# Patient Record
Sex: FEMALE | Race: WHITE | Employment: UNEMPLOYED | URBAN - METROPOLITAN AREA
[De-identification: names, ages, dates, MRNs, and addresses within clinical notes are randomized per-mention and may not be internally consistent; named-entity substitution may affect disease eponyms.]

---

## 2017-10-16 PROBLEM — G56.03 CARPAL TUNNEL SYNDROME ON BOTH SIDES: Status: ACTIVE | Noted: 2017-10-16

## 2017-10-16 PROBLEM — G43.009 MIGRAINE WITHOUT AURA AND WITHOUT STATUS MIGRAINOSUS, NOT INTRACTABLE: Status: ACTIVE | Noted: 2017-10-16

## 2017-10-16 PROBLEM — K21.9 GERD (GASTROESOPHAGEAL REFLUX DISEASE): Status: ACTIVE | Noted: 2017-06-14

## 2017-10-16 PROBLEM — I25.10 ATHEROSCLEROSIS OF CORONARY ARTERY: Status: ACTIVE | Noted: 2017-10-16

## 2017-10-16 PROBLEM — R06.02 SOB (SHORTNESS OF BREATH): Status: ACTIVE | Noted: 2017-10-16

## 2017-10-16 PROBLEM — R53.82 CHRONIC FATIGUE: Status: ACTIVE | Noted: 2017-10-16

## 2017-10-16 PROBLEM — R13.19 ESOPHAGEAL DYSPHAGIA: Status: ACTIVE | Noted: 2017-06-14

## 2017-10-16 PROBLEM — K59.01 SLOW TRANSIT CONSTIPATION: Status: ACTIVE | Noted: 2017-06-14

## 2017-10-19 ENCOUNTER — HOSPITAL ENCOUNTER (OUTPATIENT)
Dept: MAMMOGRAPHY | Age: 52
Discharge: HOME | End: 2017-10-19
Attending: NURSE PRACTITIONER

## 2017-10-19 DIAGNOSIS — Z12.31 SCREENING MAMMOGRAM, ENCOUNTER FOR: ICD-10-CM

## 2017-10-20 ENCOUNTER — HOSPITAL ENCOUNTER (OUTPATIENT)
Dept: MAMMOGRAPHY | Age: 52
Discharge: HOME | End: 2017-10-20
Attending: NURSE PRACTITIONER
Payer: COMMERCIAL

## 2017-10-20 ENCOUNTER — HOSPITAL ENCOUNTER (OUTPATIENT)
Dept: GENERAL RADIOLOGY | Age: 52
End: 2017-10-20
Payer: COMMERCIAL

## 2017-10-20 PROCEDURE — 77067 SCR MAMMO BI INCL CAD: CPT

## 2019-05-09 PROBLEM — R06.02 SOB (SHORTNESS OF BREATH): Status: RESOLVED | Noted: 2017-10-16 | Resolved: 2019-05-09

## 2019-06-17 PROBLEM — R79.89 ELEVATED TSH: Status: ACTIVE | Noted: 2019-06-17

## 2019-06-17 PROBLEM — F33.0 DEPRESSION, MAJOR, RECURRENT, MILD (HCC): Status: ACTIVE | Noted: 2019-06-17

## 2019-06-17 PROBLEM — F32.A MILD DEPRESSION: Status: ACTIVE | Noted: 2019-06-17

## 2019-06-17 PROBLEM — D22.9 MULTIPLE NEVI: Status: ACTIVE | Noted: 2019-06-17

## 2020-01-23 PROBLEM — E11.40 TYPE 2 DIABETES MELLITUS WITH DIABETIC NEUROPATHY (HCC): Status: ACTIVE | Noted: 2020-01-23

## 2020-01-23 PROBLEM — F32.A MILD DEPRESSION: Status: RESOLVED | Noted: 2019-06-17 | Resolved: 2020-01-23

## 2020-02-07 ENCOUNTER — HOSPITAL ENCOUNTER (OUTPATIENT)
Dept: CT IMAGING | Age: 55
Discharge: HOME | End: 2020-02-07
Payer: COMMERCIAL

## 2020-02-07 DIAGNOSIS — R51.9 WORSENING HEADACHES: ICD-10-CM

## 2020-02-07 PROCEDURE — 70450 CT HEAD/BRAIN W/O DYE: CPT

## 2020-02-10 NOTE — PROGRESS NOTES
Pt was made aware of her CT head being normal and she said she saw a neurologist many yrs ago for dx spinal meningitis (apporx 15 yrs ago)

## 2020-02-22 ENCOUNTER — APPOINTMENT (OUTPATIENT)
Dept: CT IMAGING | Age: 55
End: 2020-02-22
Attending: STUDENT IN AN ORGANIZED HEALTH CARE EDUCATION/TRAINING PROGRAM
Payer: COMMERCIAL

## 2020-02-22 ENCOUNTER — HOSPITAL ENCOUNTER (OUTPATIENT)
Age: 55
Setting detail: OBSERVATION
Discharge: HOME | End: 2020-02-23
Attending: STUDENT IN AN ORGANIZED HEALTH CARE EDUCATION/TRAINING PROGRAM | Admitting: SURGERY
Payer: COMMERCIAL

## 2020-02-22 DIAGNOSIS — K37 APPENDICITIS, UNSPECIFIED APPENDICITIS TYPE: Primary | ICD-10-CM

## 2020-02-22 PROBLEM — K35.80 ACUTE APPENDICITIS: Status: ACTIVE | Noted: 2020-02-22

## 2020-02-22 LAB
ALBUMIN SERPL-MCNC: 3.7 G/DL (ref 3.5–5)
ALBUMIN/GLOB SERPL: 0.9 {RATIO} (ref 1.2–3.5)
ALP SERPL-CCNC: 143 U/L (ref 50–136)
ALT SERPL-CCNC: 51 U/L (ref 12–65)
ANION GAP SERPL CALC-SCNC: 7 MMOL/L (ref 7–16)
AST SERPL-CCNC: 21 U/L (ref 15–37)
BASOPHILS # BLD: 0.1 K/UL (ref 0–0.2)
BASOPHILS NFR BLD: 1 % (ref 0–2)
BILIRUB SERPL-MCNC: 0.6 MG/DL (ref 0.2–1.1)
BUN SERPL-MCNC: 12 MG/DL (ref 6–23)
CALCIUM SERPL-MCNC: 9.8 MG/DL (ref 8.3–10.4)
CHLORIDE SERPL-SCNC: 104 MMOL/L (ref 98–107)
CO2 SERPL-SCNC: 28 MMOL/L (ref 21–32)
CREAT SERPL-MCNC: 1.05 MG/DL (ref 0.6–1)
DIFFERENTIAL METHOD BLD: NORMAL
EOSINOPHIL # BLD: 0.1 K/UL (ref 0–0.8)
EOSINOPHIL NFR BLD: 1 % (ref 0.5–7.8)
ERYTHROCYTE [DISTWIDTH] IN BLOOD BY AUTOMATED COUNT: 13.1 % (ref 11.9–14.6)
GLOBULIN SER CALC-MCNC: 4 G/DL (ref 2.3–3.5)
GLUCOSE SERPL-MCNC: 157 MG/DL (ref 65–100)
HCT VFR BLD AUTO: 43 % (ref 35.8–46.3)
HGB BLD-MCNC: 14.6 G/DL (ref 11.7–15.4)
IMM GRANULOCYTES # BLD AUTO: 0.1 K/UL (ref 0–0.5)
IMM GRANULOCYTES NFR BLD AUTO: 1 % (ref 0–5)
LIPASE SERPL-CCNC: 89 U/L (ref 73–393)
LYMPHOCYTES # BLD: 3 K/UL (ref 0.5–4.6)
LYMPHOCYTES NFR BLD: 28 % (ref 13–44)
MCH RBC QN AUTO: 29.7 PG (ref 26.1–32.9)
MCHC RBC AUTO-ENTMCNC: 34 G/DL (ref 31.4–35)
MCV RBC AUTO: 87.6 FL (ref 79.6–97.8)
MONOCYTES # BLD: 0.7 K/UL (ref 0.1–1.3)
MONOCYTES NFR BLD: 7 % (ref 4–12)
NEUTS SEG # BLD: 6.8 K/UL (ref 1.7–8.2)
NEUTS SEG NFR BLD: 63 % (ref 43–78)
NRBC # BLD: 0 K/UL (ref 0–0.2)
PLATELET # BLD AUTO: 384 K/UL (ref 150–450)
PMV BLD AUTO: 9.9 FL (ref 9.4–12.3)
POTASSIUM SERPL-SCNC: 3.8 MMOL/L (ref 3.5–5.1)
PROT SERPL-MCNC: 7.7 G/DL (ref 6.3–8.2)
RBC # BLD AUTO: 4.91 M/UL (ref 4.05–5.2)
SODIUM SERPL-SCNC: 139 MMOL/L (ref 136–145)
WBC # BLD AUTO: 10.8 K/UL (ref 4.3–11.1)

## 2020-02-22 PROCEDURE — 65270000029 HC RM PRIVATE

## 2020-02-22 PROCEDURE — 77030040361 HC SLV COMPR DVT MDII -B

## 2020-02-22 PROCEDURE — 96376 TX/PRO/DX INJ SAME DRUG ADON: CPT

## 2020-02-22 PROCEDURE — 96374 THER/PROPH/DIAG INJ IV PUSH: CPT

## 2020-02-22 PROCEDURE — 74177 CT ABD & PELVIS W/CONTRAST: CPT

## 2020-02-22 PROCEDURE — 80053 COMPREHEN METABOLIC PANEL: CPT

## 2020-02-22 PROCEDURE — 74011250636 HC RX REV CODE- 250/636: Performed by: STUDENT IN AN ORGANIZED HEALTH CARE EDUCATION/TRAINING PROGRAM

## 2020-02-22 PROCEDURE — 99218 HC RM OBSERVATION: CPT

## 2020-02-22 PROCEDURE — 85025 COMPLETE CBC W/AUTO DIFF WBC: CPT

## 2020-02-22 PROCEDURE — 83690 ASSAY OF LIPASE: CPT

## 2020-02-22 PROCEDURE — 74011636320 HC RX REV CODE- 636/320: Performed by: STUDENT IN AN ORGANIZED HEALTH CARE EDUCATION/TRAINING PROGRAM

## 2020-02-22 PROCEDURE — 77030027138 HC INCENT SPIROMETER -A

## 2020-02-22 PROCEDURE — 74011000258 HC RX REV CODE- 258: Performed by: STUDENT IN AN ORGANIZED HEALTH CARE EDUCATION/TRAINING PROGRAM

## 2020-02-22 PROCEDURE — 96375 TX/PRO/DX INJ NEW DRUG ADDON: CPT

## 2020-02-22 PROCEDURE — 99284 EMERGENCY DEPT VISIT MOD MDM: CPT

## 2020-02-22 RX ORDER — MORPHINE SULFATE 2 MG/ML
2 INJECTION, SOLUTION INTRAMUSCULAR; INTRAVENOUS
Status: DISCONTINUED | OUTPATIENT
Start: 2020-02-22 | End: 2020-02-23 | Stop reason: HOSPADM

## 2020-02-22 RX ORDER — ONDANSETRON 2 MG/ML
4 INJECTION INTRAMUSCULAR; INTRAVENOUS
Status: DISCONTINUED | OUTPATIENT
Start: 2020-02-22 | End: 2020-02-23 | Stop reason: HOSPADM

## 2020-02-22 RX ORDER — DEXTROSE, SODIUM CHLORIDE, AND POTASSIUM CHLORIDE 5; .45; .15 G/100ML; G/100ML; G/100ML
75 INJECTION INTRAVENOUS CONTINUOUS
Status: DISCONTINUED | OUTPATIENT
Start: 2020-02-22 | End: 2020-02-23 | Stop reason: HOSPADM

## 2020-02-22 RX ORDER — DIPHENHYDRAMINE HYDROCHLORIDE 50 MG/ML
12.5 INJECTION, SOLUTION INTRAMUSCULAR; INTRAVENOUS
Status: DISCONTINUED | OUTPATIENT
Start: 2020-02-22 | End: 2020-02-23 | Stop reason: HOSPADM

## 2020-02-22 RX ORDER — CEFOXITIN 2 G/1
2 INJECTION, POWDER, FOR SOLUTION INTRAVENOUS ONCE
Status: DISCONTINUED | OUTPATIENT
Start: 2020-02-22 | End: 2020-02-22 | Stop reason: SDUPTHER

## 2020-02-22 RX ORDER — NALOXONE HYDROCHLORIDE 0.4 MG/ML
0.4 INJECTION, SOLUTION INTRAMUSCULAR; INTRAVENOUS; SUBCUTANEOUS AS NEEDED
Status: DISCONTINUED | OUTPATIENT
Start: 2020-02-22 | End: 2020-02-23 | Stop reason: HOSPADM

## 2020-02-22 RX ORDER — SODIUM CHLORIDE 0.9 % (FLUSH) 0.9 %
10 SYRINGE (ML) INJECTION
Status: COMPLETED | OUTPATIENT
Start: 2020-02-22 | End: 2020-02-22

## 2020-02-22 RX ORDER — MORPHINE SULFATE 4 MG/ML
4 INJECTION INTRAVENOUS
Status: DISCONTINUED | OUTPATIENT
Start: 2020-02-22 | End: 2020-02-22

## 2020-02-22 RX ORDER — HYDROMORPHONE HYDROCHLORIDE 1 MG/ML
1 INJECTION, SOLUTION INTRAMUSCULAR; INTRAVENOUS; SUBCUTANEOUS
Status: COMPLETED | OUTPATIENT
Start: 2020-02-22 | End: 2020-02-22

## 2020-02-22 RX ORDER — ACETAMINOPHEN 325 MG/1
650 TABLET ORAL
Status: DISCONTINUED | OUTPATIENT
Start: 2020-02-22 | End: 2020-02-23 | Stop reason: HOSPADM

## 2020-02-22 RX ORDER — SODIUM CHLORIDE 0.9 % (FLUSH) 0.9 %
5-40 SYRINGE (ML) INJECTION AS NEEDED
Status: DISCONTINUED | OUTPATIENT
Start: 2020-02-22 | End: 2020-02-23 | Stop reason: HOSPADM

## 2020-02-22 RX ORDER — SODIUM CHLORIDE 0.9 % (FLUSH) 0.9 %
5-40 SYRINGE (ML) INJECTION EVERY 8 HOURS
Status: DISCONTINUED | OUTPATIENT
Start: 2020-02-22 | End: 2020-02-23 | Stop reason: HOSPADM

## 2020-02-22 RX ORDER — OXYCODONE AND ACETAMINOPHEN 5; 325 MG/1; MG/1
1 TABLET ORAL
Status: DISCONTINUED | OUTPATIENT
Start: 2020-02-22 | End: 2020-02-23 | Stop reason: HOSPADM

## 2020-02-22 RX ORDER — HYDROMORPHONE HYDROCHLORIDE 1 MG/ML
1 INJECTION, SOLUTION INTRAMUSCULAR; INTRAVENOUS; SUBCUTANEOUS ONCE
Status: COMPLETED | OUTPATIENT
Start: 2020-02-22 | End: 2020-02-22

## 2020-02-22 RX ORDER — ONDANSETRON 2 MG/ML
4 INJECTION INTRAMUSCULAR; INTRAVENOUS
Status: COMPLETED | OUTPATIENT
Start: 2020-02-22 | End: 2020-02-22

## 2020-02-22 RX ADMIN — ONDANSETRON 4 MG: 2 INJECTION INTRAMUSCULAR; INTRAVENOUS at 21:54

## 2020-02-22 RX ADMIN — HYDROMORPHONE HYDROCHLORIDE 1 MG: 1 INJECTION, SOLUTION INTRAMUSCULAR; INTRAVENOUS; SUBCUTANEOUS at 21:54

## 2020-02-22 RX ADMIN — IOPAMIDOL 100 ML: 755 INJECTION, SOLUTION INTRAVENOUS at 22:21

## 2020-02-22 RX ADMIN — SODIUM CHLORIDE 1000 ML: 900 INJECTION, SOLUTION INTRAVENOUS at 21:54

## 2020-02-22 RX ADMIN — SODIUM CHLORIDE 100 ML: 900 INJECTION, SOLUTION INTRAVENOUS at 22:21

## 2020-02-22 RX ADMIN — Medication 10 ML: at 22:21

## 2020-02-22 RX ADMIN — HYDROMORPHONE HYDROCHLORIDE 1 MG: 1 INJECTION, SOLUTION INTRAMUSCULAR; INTRAVENOUS; SUBCUTANEOUS at 22:47

## 2020-02-23 ENCOUNTER — ANESTHESIA EVENT (OUTPATIENT)
Dept: SURGERY | Age: 55
End: 2020-02-23
Payer: COMMERCIAL

## 2020-02-23 ENCOUNTER — ANESTHESIA (OUTPATIENT)
Dept: SURGERY | Age: 55
End: 2020-02-23
Payer: COMMERCIAL

## 2020-02-23 VITALS
HEIGHT: 65 IN | TEMPERATURE: 98.1 F | DIASTOLIC BLOOD PRESSURE: 86 MMHG | HEART RATE: 89 BPM | OXYGEN SATURATION: 94 % | RESPIRATION RATE: 15 BRPM | WEIGHT: 293 LBS | SYSTOLIC BLOOD PRESSURE: 130 MMHG | BODY MASS INDEX: 48.82 KG/M2

## 2020-02-23 LAB — GLUCOSE BLD STRIP.AUTO-MCNC: 189 MG/DL (ref 65–100)

## 2020-02-23 PROCEDURE — 77030008756 HC TU IRR SUC STRY -B: Performed by: SURGERY

## 2020-02-23 PROCEDURE — 76210000016 HC OR PH I REC 1 TO 1.5 HR: Performed by: SURGERY

## 2020-02-23 PROCEDURE — 74011250636 HC RX REV CODE- 250/636: Performed by: SURGERY

## 2020-02-23 PROCEDURE — 77030039425 HC BLD LARYNG TRULITE DISP TELE -A: Performed by: ANESTHESIOLOGY

## 2020-02-23 PROCEDURE — 74011000250 HC RX REV CODE- 250: Performed by: REGISTERED NURSE

## 2020-02-23 PROCEDURE — 77030040922 HC BLNKT HYPOTHRM STRY -A: Performed by: ANESTHESIOLOGY

## 2020-02-23 PROCEDURE — 74011250637 HC RX REV CODE- 250/637: Performed by: ANESTHESIOLOGY

## 2020-02-23 PROCEDURE — 99218 HC RM OBSERVATION: CPT

## 2020-02-23 PROCEDURE — 74011250637 HC RX REV CODE- 250/637: Performed by: SURGERY

## 2020-02-23 PROCEDURE — 76010000161 HC OR TIME 1 TO 1.5 HR INTENSV-TIER 1: Performed by: SURGERY

## 2020-02-23 PROCEDURE — 77030009967 HC RELD STPLR ENDOSC J&J -C: Performed by: SURGERY

## 2020-02-23 PROCEDURE — 77030031139 HC SUT VCRL2 J&J -A: Performed by: SURGERY

## 2020-02-23 PROCEDURE — 76060000033 HC ANESTHESIA 1 TO 1.5 HR: Performed by: SURGERY

## 2020-02-23 PROCEDURE — 77030011810 HC STPLR ENDOSC J&J -G: Performed by: SURGERY

## 2020-02-23 PROCEDURE — 77030037088 HC TUBE ENDOTRACH ORAL NSL COVD-A: Performed by: ANESTHESIOLOGY

## 2020-02-23 PROCEDURE — 77030016151 HC PROTCTR LNS DFOG COVD -B: Performed by: SURGERY

## 2020-02-23 PROCEDURE — 77030034849: Performed by: SURGERY

## 2020-02-23 PROCEDURE — 74011250636 HC RX REV CODE- 250/636: Performed by: REGISTERED NURSE

## 2020-02-23 PROCEDURE — 74011250636 HC RX REV CODE- 250/636: Performed by: ANESTHESIOLOGY

## 2020-02-23 PROCEDURE — 82962 GLUCOSE BLOOD TEST: CPT

## 2020-02-23 PROCEDURE — 88304 TISSUE EXAM BY PATHOLOGIST: CPT

## 2020-02-23 PROCEDURE — 77030034154 HC SHR COAG HARM ACE J&J -F: Performed by: SURGERY

## 2020-02-23 PROCEDURE — 77030037892: Performed by: SURGERY

## 2020-02-23 PROCEDURE — 44970 LAPAROSCOPY APPENDECTOMY: CPT | Performed by: SURGERY

## 2020-02-23 PROCEDURE — 77030019908 HC STETH ESOPH SIMS -A: Performed by: ANESTHESIOLOGY

## 2020-02-23 PROCEDURE — 77030040361 HC SLV COMPR DVT MDII -B: Performed by: SURGERY

## 2020-02-23 PROCEDURE — 74011000250 HC RX REV CODE- 250: Performed by: SURGERY

## 2020-02-23 PROCEDURE — 77030018836 HC SOL IRR NACL ICUM -A: Performed by: SURGERY

## 2020-02-23 PROCEDURE — 0DTJ4ZZ RESECTION OF APPENDIX, PERCUTANEOUS ENDOSCOPIC APPROACH: ICD-10-PCS | Performed by: SURGERY

## 2020-02-23 PROCEDURE — 99223 1ST HOSP IP/OBS HIGH 75: CPT | Performed by: SURGERY

## 2020-02-23 PROCEDURE — 74011000258 HC RX REV CODE- 258: Performed by: SURGERY

## 2020-02-23 PROCEDURE — 77030008522 HC TBNG INSUF LAPRO STRY -B: Performed by: SURGERY

## 2020-02-23 RX ORDER — OXYCODONE HYDROCHLORIDE 5 MG/1
10 TABLET ORAL
Status: DISCONTINUED | OUTPATIENT
Start: 2020-02-23 | End: 2020-02-23 | Stop reason: HOSPADM

## 2020-02-23 RX ORDER — LIDOCAINE HYDROCHLORIDE 10 MG/ML
0.1 INJECTION INFILTRATION; PERINEURAL AS NEEDED
Status: DISCONTINUED | OUTPATIENT
Start: 2020-02-23 | End: 2020-02-23 | Stop reason: HOSPADM

## 2020-02-23 RX ORDER — FAMOTIDINE 20 MG/1
20 TABLET, FILM COATED ORAL ONCE
Status: COMPLETED | OUTPATIENT
Start: 2020-02-23 | End: 2020-02-23

## 2020-02-23 RX ORDER — FENTANYL CITRATE 50 UG/ML
INJECTION, SOLUTION INTRAMUSCULAR; INTRAVENOUS AS NEEDED
Status: DISCONTINUED | OUTPATIENT
Start: 2020-02-23 | End: 2020-02-23 | Stop reason: HOSPADM

## 2020-02-23 RX ORDER — SUCCINYLCHOLINE CHLORIDE 20 MG/ML
INJECTION INTRAMUSCULAR; INTRAVENOUS AS NEEDED
Status: DISCONTINUED | OUTPATIENT
Start: 2020-02-23 | End: 2020-02-23 | Stop reason: HOSPADM

## 2020-02-23 RX ORDER — CYCLOSPORINE 0.5 MG/ML
1 EMULSION OPHTHALMIC EVERY 12 HOURS
COMMUNITY

## 2020-02-23 RX ORDER — HYDROMORPHONE HYDROCHLORIDE 2 MG/ML
0.5 INJECTION, SOLUTION INTRAMUSCULAR; INTRAVENOUS; SUBCUTANEOUS
Status: DISCONTINUED | OUTPATIENT
Start: 2020-02-23 | End: 2020-02-23 | Stop reason: HOSPADM

## 2020-02-23 RX ORDER — ONDANSETRON 2 MG/ML
INJECTION INTRAMUSCULAR; INTRAVENOUS AS NEEDED
Status: DISCONTINUED | OUTPATIENT
Start: 2020-02-23 | End: 2020-02-23 | Stop reason: HOSPADM

## 2020-02-23 RX ORDER — FENTANYL CITRATE 50 UG/ML
100 INJECTION, SOLUTION INTRAMUSCULAR; INTRAVENOUS ONCE
Status: DISCONTINUED | OUTPATIENT
Start: 2020-02-23 | End: 2020-02-23 | Stop reason: HOSPADM

## 2020-02-23 RX ORDER — SODIUM CHLORIDE, SODIUM LACTATE, POTASSIUM CHLORIDE, CALCIUM CHLORIDE 600; 310; 30; 20 MG/100ML; MG/100ML; MG/100ML; MG/100ML
25 INJECTION, SOLUTION INTRAVENOUS CONTINUOUS
Status: DISCONTINUED | OUTPATIENT
Start: 2020-02-23 | End: 2020-02-23 | Stop reason: HOSPADM

## 2020-02-23 RX ORDER — SODIUM CHLORIDE, SODIUM LACTATE, POTASSIUM CHLORIDE, CALCIUM CHLORIDE 600; 310; 30; 20 MG/100ML; MG/100ML; MG/100ML; MG/100ML
75 INJECTION, SOLUTION INTRAVENOUS CONTINUOUS
Status: DISCONTINUED | OUTPATIENT
Start: 2020-02-23 | End: 2020-02-23 | Stop reason: HOSPADM

## 2020-02-23 RX ORDER — CEFOXITIN 2 G/1
2 INJECTION, POWDER, FOR SOLUTION INTRAVENOUS EVERY 6 HOURS
Status: DISCONTINUED | OUTPATIENT
Start: 2020-02-23 | End: 2020-02-23 | Stop reason: SDUPTHER

## 2020-02-23 RX ORDER — DEXAMETHASONE SODIUM PHOSPHATE 4 MG/ML
INJECTION, SOLUTION INTRA-ARTICULAR; INTRALESIONAL; INTRAMUSCULAR; INTRAVENOUS; SOFT TISSUE AS NEEDED
Status: DISCONTINUED | OUTPATIENT
Start: 2020-02-23 | End: 2020-02-23 | Stop reason: HOSPADM

## 2020-02-23 RX ORDER — LIDOCAINE HYDROCHLORIDE 20 MG/ML
INJECTION, SOLUTION EPIDURAL; INFILTRATION; INTRACAUDAL; PERINEURAL AS NEEDED
Status: DISCONTINUED | OUTPATIENT
Start: 2020-02-23 | End: 2020-02-23 | Stop reason: HOSPADM

## 2020-02-23 RX ORDER — PROPOFOL 10 MG/ML
INJECTION, EMULSION INTRAVENOUS AS NEEDED
Status: DISCONTINUED | OUTPATIENT
Start: 2020-02-23 | End: 2020-02-23 | Stop reason: HOSPADM

## 2020-02-23 RX ORDER — OXYCODONE AND ACETAMINOPHEN 5; 325 MG/1; MG/1
1 TABLET ORAL
Qty: 20 TAB | Refills: 0 | Status: SHIPPED | OUTPATIENT
Start: 2020-02-23 | End: 2020-02-28

## 2020-02-23 RX ORDER — ROCURONIUM BROMIDE 10 MG/ML
INJECTION, SOLUTION INTRAVENOUS AS NEEDED
Status: DISCONTINUED | OUTPATIENT
Start: 2020-02-23 | End: 2020-02-23 | Stop reason: HOSPADM

## 2020-02-23 RX ORDER — OXYCODONE HYDROCHLORIDE 5 MG/1
5 TABLET ORAL
Status: DISCONTINUED | OUTPATIENT
Start: 2020-02-23 | End: 2020-02-23 | Stop reason: HOSPADM

## 2020-02-23 RX ORDER — MIDAZOLAM HYDROCHLORIDE 1 MG/ML
2 INJECTION, SOLUTION INTRAMUSCULAR; INTRAVENOUS ONCE
Status: DISCONTINUED | OUTPATIENT
Start: 2020-02-23 | End: 2020-02-23 | Stop reason: HOSPADM

## 2020-02-23 RX ORDER — METRONIDAZOLE 500 MG/100ML
500 INJECTION, SOLUTION INTRAVENOUS
Status: COMPLETED | OUTPATIENT
Start: 2020-02-23 | End: 2020-02-23

## 2020-02-23 RX ORDER — BUPIVACAINE HYDROCHLORIDE AND EPINEPHRINE 2.5; 5 MG/ML; UG/ML
INJECTION, SOLUTION EPIDURAL; INFILTRATION; INTRACAUDAL; PERINEURAL AS NEEDED
Status: DISCONTINUED | OUTPATIENT
Start: 2020-02-23 | End: 2020-02-23 | Stop reason: HOSPADM

## 2020-02-23 RX ORDER — MIDAZOLAM HYDROCHLORIDE 1 MG/ML
2 INJECTION, SOLUTION INTRAMUSCULAR; INTRAVENOUS ONCE
Status: COMPLETED | OUTPATIENT
Start: 2020-02-23 | End: 2020-02-23

## 2020-02-23 RX ADMIN — MIDAZOLAM 2 MG: 1 INJECTION INTRAMUSCULAR; INTRAVENOUS at 08:38

## 2020-02-23 RX ADMIN — FENTANYL CITRATE 50 MCG: 50 INJECTION INTRAMUSCULAR; INTRAVENOUS at 10:30

## 2020-02-23 RX ADMIN — CEFOXITIN SODIUM 2 G: 2 POWDER, FOR SOLUTION INTRAVENOUS at 00:30

## 2020-02-23 RX ADMIN — SODIUM CHLORIDE, SODIUM LACTATE, POTASSIUM CHLORIDE, AND CALCIUM CHLORIDE: 600; 310; 30; 20 INJECTION, SOLUTION INTRAVENOUS at 09:47

## 2020-02-23 RX ADMIN — Medication 10 ML: at 00:33

## 2020-02-23 RX ADMIN — SODIUM CHLORIDE, SODIUM LACTATE, POTASSIUM CHLORIDE, AND CALCIUM CHLORIDE 25 ML/HR: 600; 310; 30; 20 INJECTION, SOLUTION INTRAVENOUS at 08:17

## 2020-02-23 RX ADMIN — LIDOCAINE HYDROCHLORIDE 100 MG: 20 INJECTION, SOLUTION EPIDURAL; INFILTRATION; INTRACAUDAL; PERINEURAL at 09:54

## 2020-02-23 RX ADMIN — DEXTROSE MONOHYDRATE, SODIUM CHLORIDE, AND POTASSIUM CHLORIDE 75 ML/HR: 50; 4.5; 1.49 INJECTION, SOLUTION INTRAVENOUS at 00:07

## 2020-02-23 RX ADMIN — PROPOFOL 170 MG: 10 INJECTION, EMULSION INTRAVENOUS at 09:54

## 2020-02-23 RX ADMIN — SUCCINYLCHOLINE CHLORIDE 200 MG: 20 INJECTION, SOLUTION INTRAMUSCULAR; INTRAVENOUS at 09:54

## 2020-02-23 RX ADMIN — METRONIDAZOLE 500 MG: 500 INJECTION, SOLUTION INTRAVENOUS at 08:38

## 2020-02-23 RX ADMIN — FAMOTIDINE 20 MG: 20 TABLET, FILM COATED ORAL at 08:19

## 2020-02-23 RX ADMIN — OXYCODONE HYDROCHLORIDE AND ACETAMINOPHEN 1 TABLET: 5; 325 TABLET ORAL at 12:50

## 2020-02-23 RX ADMIN — FENTANYL CITRATE 50 MCG: 50 INJECTION INTRAMUSCULAR; INTRAVENOUS at 09:54

## 2020-02-23 RX ADMIN — FENTANYL CITRATE 25 MCG: 50 INJECTION INTRAMUSCULAR; INTRAVENOUS at 10:23

## 2020-02-23 RX ADMIN — DEXAMETHASONE SODIUM PHOSPHATE 4 MG: 4 INJECTION, SOLUTION INTRAMUSCULAR; INTRAVENOUS at 10:40

## 2020-02-23 RX ADMIN — ROCURONIUM BROMIDE 30 MG: 10 INJECTION, SOLUTION INTRAVENOUS at 10:02

## 2020-02-23 RX ADMIN — OXYCODONE HYDROCHLORIDE AND ACETAMINOPHEN 1 TABLET: 5; 325 TABLET ORAL at 04:15

## 2020-02-23 RX ADMIN — SUGAMMADEX 400 MG: 100 INJECTION, SOLUTION INTRAVENOUS at 10:49

## 2020-02-23 RX ADMIN — ACETAMINOPHEN 650 MG: 325 TABLET, FILM COATED ORAL at 07:59

## 2020-02-23 RX ADMIN — ONDANSETRON 4 MG: 2 INJECTION INTRAMUSCULAR; INTRAVENOUS at 10:40

## 2020-02-23 RX ADMIN — CEFAZOLIN 3 G: 1 INJECTION, POWDER, FOR SOLUTION INTRAVENOUS at 10:03

## 2020-02-23 RX ADMIN — ROCURONIUM BROMIDE 5 MG: 10 INJECTION, SOLUTION INTRAVENOUS at 09:54

## 2020-02-23 RX ADMIN — FENTANYL CITRATE 25 MCG: 50 INJECTION INTRAMUSCULAR; INTRAVENOUS at 10:06

## 2020-02-23 RX ADMIN — FENTANYL CITRATE 50 MCG: 50 INJECTION INTRAMUSCULAR; INTRAVENOUS at 11:05

## 2020-02-23 RX ADMIN — ROCURONIUM BROMIDE 5 MG: 10 INJECTION, SOLUTION INTRAVENOUS at 10:26

## 2020-02-23 NOTE — PERIOP NOTES
Pt received from OR. Report received from [facility] and CRNA. Care of pt assumed. Pt placed on monitors and oxygen. See doc flowsheet for complete assessment. Incision x3 intact with steristrips.

## 2020-02-23 NOTE — ED NOTES
TRANSFER - OUT REPORT:    Verbal report given to Olayinka Garcia RN on Venice Hatchet  being transferred to [facility] for routine progression of care       Report consisted of patients Situation, Background, Assessment and   Recommendations(SBAR). Information from the following report(s) SBAR, ED Summary, [facility] and Recent Results was reviewed with the receiving nurse. Lines:   Peripheral IV 02/22/20 Right Antecubital (Active)   Site Assessment Clean, dry, & intact 2/22/2020  9:32 PM   Phlebitis Assessment 0 2/22/2020  9:32 PM   Infiltration Assessment 0 2/22/2020  9:32 PM   Dressing Status Clean, dry, & intact 2/22/2020  9:32 PM   Dressing Type Transparent 2/22/2020  9:32 PM   Hub Color/Line Status Pink 2/22/2020  9:32 PM        Opportunity for questions and clarification was provided.       Patient transported with:   [employer]

## 2020-02-23 NOTE — BRIEF OP NOTE
BRIEF OPERATIVE NOTE    Date of Procedure: 2/23/2020   Preoperative Diagnosis: Appendicitis  Postoperative Diagnosis: Appendicitis    Procedure(s):  APPENDECTOMY LAPAROSCOPIC CPT 05402  Surgeon(s) and Role:     * Kristie Mckinley DO - Primary         Surgical Assistant: None    Surgical Staff:  Circ-1: Idalia Robertson RN  Scrub Tech-1: Alisha Salguero  Scrub Tech-2: Tiffanie Pimentel  Event Time In Time Out   Incision Start 02/23/2020 1010    Incision Close 02/23/2020 1048      Anesthesia: General   Estimated Blood Loss: Minimal   Specimens:   ID Type Source Tests Collected by Time Destination   1 : Appendix Preservative Appendix  Eloisa Rey DO 2/23/2020 1033 Pathology      Findings: Gangrenous appendicitis without rupture.      Complications: None  Implants: * No implants in log [facility] Montes. [facility]

## 2020-02-23 NOTE — PERIOP NOTES
TRANSFER - OUT REPORT:    Verbal report given to Earl Allan RN on Jose Koch  being transferred to [facility] for routine post - op       Report consisted of patients Situation, Background, Assessment and   Recommendations(SBAR). Information from the following report(s) SBAR, OR Summary and Intake/Output was reviewed with the receiving nurse. Lines:   Peripheral IV 02/22/20 Right Antecubital (Active)   Site Assessment Clean, dry, & intact 2/23/2020 11:07 AM   Phlebitis Assessment 0 2/23/2020 11:07 AM   Infiltration Assessment 0 2/23/2020 11:07 AM   Dressing Status Clean, dry, & intact 2/23/2020 11:07 AM   Dressing Type Transparent;Tape 2/23/2020 11:07 AM   Hub Color/Line Status Pink 2/23/2020 11:07 AM   Alcohol Cap Used No 2/23/2020 11:07 AM        Opportunity for questions and clarification was provided. Patient transported with:   O2 @ 0 liters    VTE prophylaxis orders have been written for Jose Koch.      No family in surgical waiting room

## 2020-02-23 NOTE — PROGRESS NOTES
02/22/20 2350   Dual Skin Pressure Injury Assessment   Dual Skin Pressure Injury Assessment WDL   Second Care Provider (Based on [address] [place] [address]) Meka Bullock RN   Skin Integumentary   Skin Integumentary (WDL) WDL     No breakdown noted.

## 2020-02-23 NOTE — ED TRIAGE NOTES
Pt ambulatory to room with some difficulty. Pt hunched over and walking slowly. Pt states RLQ abdominal pain x yesterday with nausea and some diarrhea. States pain is not relieved by anything. States pain does not radiate to back. Describes pain as cramping, but turns to stabbing when pt moves. Pt also states increase in urinary frequency yesterday as well. Pt states has never had pain like this. Pt hypertensive. States has hypertension and takes lisinopril, but has not taken medication today.  PT also states has had full hysterectomy in 2014

## 2020-02-23 NOTE — ED PROVIDER NOTES
54-year-old female patient presented with reports of right lower quadrant abdominal pain and nausea. Symptoms started last evening. Patient reports worsened pain with movement, palpation. She states that upright positioning makes her pain slightly improved urinary supine positioning makes it hurt worse. She reports loose and stools are free of bloody black or tarry appearance. She reports normal urinary is. History of total hysterectomy, denies any other surgical interventions on her abdomen in the past.  Patient arrives with significant hypertension. She is not had her blood pressure medication tonight which is lisinopril.   She also notes history of significant hypertension with pain in the past.           Past Medical History:   Diagnosis Date    Anxiety     Asthma     Depression     GERD (gastroesophageal reflux disease)     Headache     Hx of seasonal allergies     Hypertension        Past Surgical History:   Procedure Laterality Date    HX HYSTERECTOMY  2014    total     HX OOPHORECTOMY           Family History:   Problem Relation Age of Onset    Hypertension Mother     Hypertension Father     Other Father         epilepsy    Heart Disease Father     Diabetes Sister        Social History     Socioeconomic History    Marital status:      Spouse name: Not on file    Number of children: Not on file    Years of education: Not on file    Highest education level: Not on file   Occupational History    Not on file   Social Needs    Financial resource strain: Not on file    Food insecurity:     Worry: Not on file     Inability: Not on file    Transportation needs:     Medical: Not on file     Non-medical: Not on file   Tobacco Use    Smoking status: Never Smoker    Smokeless tobacco: Never Used   Substance and Sexual Activity    Alcohol use: No    Drug use: No    Sexual activity: Yes     Partners: Male     Birth control/protection: None   Lifestyle    Physical activity:     Days per week: Not on file     Minutes per session: Not on file    Stress: Not on file   Relationships    Social connections:     Talks on phone: Not on file     Gets together: Not on file     Attends Mosque service: Not on file     Active member of club or organization: Not on file     Attends meetings of clubs or organizations: Not on file     Relationship status: Not on file    Intimate partner violence:     Fear of current or ex partner: Not on file     Emotionally abused: Not on file     Physically abused: Not on file     Forced sexual activity: Not on file   Other Topics Concern    Not on file   Social History Narrative    Not on file         ALLERGIES: Aspirin; Escitalopram oxalate; and Hydrocodone-acetaminophen    Review of Systems   Constitutional: Negative for chills, diaphoresis and fever. HENT: Negative for congestion, sneezing and sore throat. Eyes: Negative for visual disturbance. Respiratory: Negative for cough, chest tightness, shortness of breath and wheezing. Cardiovascular: Negative for chest pain and leg swelling. Gastrointestinal: Positive for abdominal pain and nausea. Negative for blood in stool, diarrhea and vomiting. Endocrine: Negative for polyuria. Genitourinary: Negative for difficulty urinating, dysuria, flank pain, hematuria and urgency. Musculoskeletal: Negative for back pain, myalgias, neck pain and neck stiffness. Skin: Negative for color change and rash. Neurological: Negative for dizziness, syncope, speech difficulty, weakness, light-headedness, numbness and headaches. Psychiatric/Behavioral: Negative for behavioral problems. All other systems reviewed and are negative. Vitals:    02/22/20 2107   BP: (!) 224/122   Pulse: (!) 107   Resp: 20   Temp: 98.1 °F (36.7 °C)   SpO2: 97%   Weight: 144.2 kg (318 lb)   Height: 5' 5\" (1.651 m)            Physical Exam  Vitals signs and nursing note reviewed.    Constitutional:       General: She is not in acute distress. Appearance: She is well-developed. She is not diaphoretic. Comments: Alert and oriented to person place and time. No acute distress, speaks in clear, fluid sentences. HENT:      Head: Normocephalic and atraumatic. Right Ear: External ear normal.      Left Ear: External ear normal.      Nose: Nose normal.   Eyes:      Pupils: Pupils are equal, round, and reactive to light. Neck:      Musculoskeletal: Normal range of motion. Cardiovascular:      Rate and Rhythm: Normal rate and regular rhythm. Heart sounds: Normal heart sounds. No murmur. No friction rub. No gallop. Pulmonary:      Effort: Pulmonary effort is normal. No respiratory distress. Breath sounds: Normal breath sounds. No stridor. No decreased breath sounds, wheezing, rhonchi or rales. Chest:      Chest wall: No tenderness. Abdominal:      General: There is no distension. Palpations: Abdomen is soft. There is no mass. Tenderness: There is abdominal tenderness in the right lower quadrant. There is no guarding or rebound. Hernia: No hernia is present. Comments: Significant pain with palpation of the right lower quadrant and McBurney's point. There is some mild guarding. Obese abdomen, otherwise soft and to palpation. Musculoskeletal: Normal range of motion. General: No tenderness or deformity. Skin:     General: Skin is warm and dry. Neurological:      Mental Status: She is alert and oriented to person, place, and time. Cranial Nerves: No cranial nerve deficit.           MDM  Number of Diagnoses or Management Options     Amount and/or Complexity of Data Reviewed  Clinical lab tests: ordered and reviewed  Tests in the radiology section of CPT®: ordered and reviewed  Tests in the medicine section of CPT®: ordered and reviewed  Independent visualization of images, tracings, or specimens: yes    Risk of Complications, Morbidity, and/or Mortality  Presenting problems: moderate  Diagnostic procedures: low  Management options: moderate    Patient Progress  Patient progress: stable         Procedures

## 2020-02-23 NOTE — PROGRESS NOTES
Nutrition  Reason for assessment: Referral received from nursing admission Malnutrition Screening Tool   Recently Lost Weight Without Trying: Yes  If Yes, How Much Weight Loss: 14 - 23 lbs  Eating Poorly Due to Decreased Appetite: No  Assessment:   Diet: DIET NPO    Food/Nutrition Patient History:  The patient is noted to have a h/o HTN, morbid obesity, Diabetes, GERD, slow transit constipation and chronic fatigue. She is currently admitted for appendicitis and is s/p laparoscopic appendectomy. The patient was groggy from surgery during RD rounds. RD was unable to obtain much information other than she has lost some weight. She does not feel up to eating at this time. Weight history in the EMR cannot be verified as accurate due to unknown weight source (pt stated vs estimated vs measured). Weight Loss Metrics 2/22/2020 1/23/2020 9/19/2019 7/31/2019   Today's Wt 318 lb 318 lb 325 lb 332 lb   BMI 52.92 kg/m2 52.92 kg/m2 54.08 kg/m2 55.25 kg/m2     Weight Loss Metrics 6/17/2019 5/9/2019 1/23/2019   Today's Wt 336 lb 333 lb 333 lb 8 oz   BMI 55.91 kg/m2 55.41 kg/m2 55.5 kg/m2   According to the EMR over the past ~13 months the patient has lost ~15 lbs. This is a clinically insignificant weight loss. Anthropometrics:  Height: 5' 5\" (165.1 cm), Weight: 144.2 kg (318 lb), Weight Source: Patient stated, Body mass index is 52.92 kg/m². BMI class of extreme obesity. Macronutrient needs: (using IBW (Ideal body weight) 56.8 kg)  EER: 1669-5674 kcal/day 25-30 kcal/kg   EPR:  g/day 1.5-2 g/kg    Intake/Comparative Standards: Patient is currently NPO. Insufficient intake data. Unable to assess if needs are met. Defer assessment until more data available. Nutrition Diagnosis:  Inadequate oral intake related to decreased ability to consume sufficient energy as evidenced by s/p appendectomy and NPO. Nutrition Intervention:  Meals and snacks: Advance diet as medically appropriate  Discharge Nutrition Plan:  Too soon to determine. Radha Echevarria, Inga Yogesh [address], [address] [place] [address], LD   [phone]

## 2020-02-23 NOTE — PROGRESS NOTES
TRANSFER - IN REPORT:    Verbal report received from [address] [place] [address],[address] on [address]  being received from PACU for routine progression of care      Report consisted of patients Situation, Background, Assessment and   Recommendations(SBAR). Information from the following report(s) SBAR, Kardex, Intake/Output and MAR was reviewed with the receiving nurse. Opportunity for questions and clarification was provided. Assessment completed upon patients arrival to unit and care assumed.

## 2020-02-23 NOTE — ANESTHESIA PREPROCEDURE EVALUATION
Relevant Problems   No relevant active problems       Anesthetic History               Review of Systems / Medical History  Patient summary reviewed and pertinent labs reviewed    Pulmonary            Asthma        Neuro/Psych              Cardiovascular    Hypertension: poorly controlled              Exercise tolerance: >4 METS  Comments: Occas SVT   GI/Hepatic/Renal     GERD: well controlled           Endo/Other    Diabetes: type 2    Morbid obesity     Other Findings              Physical Exam    Airway  Mallampati: I  TM Distance: > 6 cm  Neck ROM: normal range of motion   Mouth opening: Normal     Cardiovascular  Regular rate and rhythm,  S1 and S2 normal,  no murmur, click, rub, or gallop  Rhythm: regular  Rate: normal         Dental    Dentition: Full lower dentures and Full upper dentures     Pulmonary  Breath sounds clear to auscultation               Abdominal         Other Findings            Anesthetic Plan    ASA: 3, emergent  Anesthesia type: general            Anesthetic plan and risks discussed with: Patient

## 2020-02-23 NOTE — PROGRESS NOTES
END OF SHIFT NOTE:    INTAKE/OUTPUT  02/22 0701 - 02/23 0700  In: 4206 [I.V.:1377]  Out: 300 [Urine:300]  Voiding: YES  Catheter: NO  Drain:              Flatus: Patient does have flatus present. Stool:  0 occurrences. Characteristics:      Emesis: 0 occurrences. Characteristics:        VITAL SIGNS  Patient Vitals for the past 12 hrs:   Temp Pulse Resp BP SpO2   02/23/20 0001 98 °F (36.7 °C) 73 19 142/90 95 %   02/22/20 2303  71  129/81 95 %   02/22/20 2250  79  (!) 155/91 96 %   02/22/20 2248  79  (!) 153/91 94 %   02/22/20 2216  85  147/81 96 %   02/22/20 2201  95  160/90 97 %   02/22/20 2158  96  (!) 187/100 98 %   02/22/20 2107 98.1 °F (36.7 °C) (!) 107 20 (!) 224/122 97 %       Pain Assessment  Pain Intensity 1: 5 (02/23/20 4594)  Pain Location 1: Abdomen  Pain Intervention(s) 1: Medication (see MAR)  Patient Stated Pain Goal: 0    Ambulating  Yes    Shift report given to oncoming nurse at the bedside.     Valeria Manrique RN

## 2020-02-23 NOTE — PROGRESS NOTES
TRANSFER - IN REPORT:    Verbal report received from Abdon Parra RN(name) on [address]  being received from ER(unit) for routine progression of care      Report consisted of patients Situation, Background, Assessment and   Recommendations(SBAR). Information from the following report(s) SBAR, ED Summary and Recent Results was reviewed with the receiving nurse. Opportunity for questions and clarification was provided. Assessment completed upon patients arrival to unit and care assumed. Pt to be brought to floor by transport.

## 2020-02-23 NOTE — OP NOTES
[address] [place] [address]  OPERATIVE REPORT    Name:  Leo Hernández  MR#:  [id number]  :  1965  ACCOUNT #:  [de-identified]  DATE OF SERVICE:  2020    PREOPERATIVE DIAGNOSIS:  Acute appendicitis. POSTOPERATIVE DIAGNOSIS:  Gangrenous appendicitis. PROCEDURE PERFORMED:  Laparoscopic appendectomy, CPT code 84512. SURGEON:  Xni Villareal DO    ASSISTANT:  None. ANESTHESIA:  General endotracheal.    COMPLICATIONS:  None. SPECIMENS REMOVED:  Appendix. IMPLANTS:  None. ESTIMATED BLOOD LOSS:  Minimal.    DISPOSITION:  Stable. COUNT:  Sponge count, needle count, instrument count all correct x3. DESCRIPTION OF PROCEDURE:  This is a 30-year-old female with CT findings of acute appendicitis without rupture. Consent was obtained for laparoscopic appendectomy. The procedures was described with the patient including potential complications to include infection and bleeding. Consent was obtained and placed on the final chart. She was administered with appropriate IV antibiotics preoperatively. She was taken to the operating suite, placed in a supine position, and general anesthesia was initiated without complications. She was then prepped and draped in the usual sterile fashion. Time-out was taken to confirm the patient's name and proper procedure. Following this, an infraumbilical incision was planned. A 0.25% Marcaine with epinephrine was used to anesthetize the skin and subcutaneous tissue and a #11 scalpel blade was used to make a skin incision. Bovie cauterization was used to dissect down to the rectus fascia. The fascia was then incised and an 0 Vicryl suture was placed as an anchoring stitch. The peritoneum was elevated between tonsil of Schnidt and entered with the Metzenbaum scissors. The Justus trocar was then placed into the peritoneum and secured in place and a CO2 gas was used to create pneumoperitoneum at 15 mmHg.   A laparoscope was passed into the abdomen and a brief survey revealed inflammatory process in the right lower quadrant consistent with appendicitis with some gangrenous change at the tip of the appendix. We placed a 5-mm suprapubic and left lower quadrant trocars under direct visualization with no bowel injury. We then began dissecting the right lower quadrant, freeing the appendix from some anterior omental attachments until we were able to identify the base. The mesoappendix was then taken down using Harmonic scalpel. Once the base was cleared, we divided it with a blue FE stapling device. The appendix was then placed into an EndoCatch bag, retrieved and sent to Pathology for analysis. There was no evidence of abscess or rupture. We copiously irrigated with saline until clear. We ensured hemostasis using spot cauterization. Once hemostasis was confirmed, we irrigated into the pelvis without any evidence of purulent discharge or inflammatory material.  At this point, we concluded the case. We removed all trocars in the usual fashion with no evidence of bleeding. The periumbilical port site was closed with 0 Vicryl in a figure-of-eight fashion. We irrigated with saline until clear. We reapproximated the skin edges with 3-0 Vicryl in a simple running fashion. Mastisol and Steri-Strips were placed up the incision. Sterile dressing was applied. The patient will be extubated and transferred to recovery stable. FINDINGS:  A 71-year-old female with acute appendicitis. Gangrenous appendicitis without rupture was identified. Laparoscopic technique was successful without evidence of any rupture or abscess. She tolerated the procedure well.       [address], DO [id number]  D:  02/23/2020 10:56  T:  02/23/2020 15:47  JOB #:  [id number]

## 2020-02-23 NOTE — PROGRESS NOTES
TRANSFER - OUT REPORT:    [facility] on [address]  being transferred to Pre-op(unit) for ordered procedure       Report consisted of patients Situation, Background, Assessment and   Recommendations(SBAR). Information from the following report(s) SBAR and Recent Results was reviewed with the receiving nurse. Lines:   Peripheral IV 02/22/20 Right Antecubital (Active)   Site Assessment Clean, dry, & intact 2/22/2020 11:50 PM   Phlebitis Assessment 0 2/22/2020 11:50 PM   Infiltration Assessment 0 2/22/2020 11:50 PM   Dressing Status Clean, dry, & intact 2/22/2020 11:50 PM   Dressing Type Transparent 2/22/2020 11:50 PM   Hub Color/Line Status Flushed; Infusing 2/22/2020 11:50 PM        Opportunity for questions and clarification was provided.       Patient transported with:   [employer]

## 2020-02-23 NOTE — PROGRESS NOTES
Pt states allergy to morphine because it makes her \"wig out. \" Offered to page on call surgeon for alternate IV pain medication; pt refused stating PO percocet would be adequate.

## 2020-02-23 NOTE — DISCHARGE INSTRUCTIONS
Post op instructions:  1. May shower only, no tub bathing, no hot tub, no swimming. 2. Keep incision clean with regular soap and water. 3. No lifting over 10 lbs. 4. Regular diet as tolerated. 5. May remove topical dressing on Day #2. Leave steri strips until seen in Dr. Mckinley's office at follow up appointment. 6. No driving on pain medicines. 7. Resume home medicines as usual.     Dilshad Mckinley DO      DISCHARGE SUMMARY from Nurse    PATIENT INSTRUCTIONS:    After general anesthesia or intravenous sedation, for 24 hours or while taking prescription Narcotics:  · Limit your activities  · Do not drive and operate hazardous machinery  · Do not make important personal or business decisions  · Do  not drink alcoholic beverages  · If you have not urinated within 8 hours after discharge, please contact your surgeon on call. Report the following to your surgeon:  · Excessive pain, swelling, redness or odor of or around the surgical area  · Temperature over 100.5  · Nausea and vomiting lasting longer than 4 hours or if unable to take medications  · Any signs of decreased circulation or nerve impairment to extremity: change in color, persistent  numbness, tingling, coldness or increase pain  · Any questions    What to do at Home:  Recommended activity: No lifting greater than 5-10 lbs, Driving while taking pain medication, or Strenuous exercise for 2-3 weeks or until follow up appt. If you experience any of the following symptoms fever of 101 or greater, uncontrolled nausea and vomiting, pain unrelieved from pain medications redness or swelling at surgical site, and drainage from surgical incison, please follow up with Dr. Cosme Bernard.    *  Please give a list of your current medications to your Primary Care Provider. *  Please update this list whenever your medications are discontinued, doses are      changed, or new medications (including over-the-counter products) are added.     *  Please carry medication information at all times in case of emergency situations. These are general instructions for a healthy lifestyle:    No smoking/ No tobacco products/ Avoid exposure to second hand smoke  Surgeon General's Warning:  Quitting smoking now greatly reduces serious risk to your health. Obesity, smoking, and sedentary lifestyle greatly increases your risk for illness    A healthy diet, regular physical exercise & weight monitoring are important for maintaining a healthy lifestyle    You may be retaining fluid if you have a history of heart failure or if you experience any of the following symptoms:  Weight gain of 3 pounds or more overnight or 5 pounds in a week, increased swelling in our hands or feet or shortness of breath while lying flat in bed. Please call your doctor as soon as you notice any of these symptoms; do not wait until your next office visit. The discharge information has been reviewed with the patient and spouse. The patient and spouse verbalized understanding. Discharge medications reviewed with the patient and spouse and appropriate educational materials and side effects teaching were provided. Patient Education        Appendectomy: What to Expect at Home  Your Recovery    Your doctor removed your appendix either by making many small cuts, called incisions, in your belly (laparoscopic surgery) or through open surgery. In open surgery, the doctor makes one large incision. The incisions leave scars that usually fade over time. After your surgery, it is normal to feel weak and tired for several days after you return home. Your belly may be swollen and may be painful. If you had laparoscopic surgery, you may have pain in your shoulder for about 24 hours. You may also feel sick to your stomach and have diarrhea, constipation, gas, or a headache. This usually goes away in a few days. Your recovery time depends on the type of surgery you had.  If you had laparoscopic surgery, you will probably be able to return to work or a normal routine 1 to 3 weeks after surgery. If you had an open surgery, it may take 2 to 4 weeks. If your appendix ruptured, you may have a drain in your incision. Your body will work fine without an appendix. You will not have to make any changes in your diet or lifestyle. This care sheet gives you a general idea about how long it will take for you to recover. But each person recovers at a different pace. Follow the steps below to get better as quickly as possible. How can you care for yourself at home? Activity    · Rest when you feel tired. Getting enough sleep will help you recover.     · Try to walk each day. Start by walking a little more than you did the day before. Bit by bit, increase the amount you walk. Walking boosts blood flow and helps prevent pneumonia and constipation.     · For about 2 weeks, avoid lifting anything that would make you strain. This may include a child, heavy grocery bags and milk containers, a heavy briefcase or backpack, cat litter or dog food bags, or a vacuum .     · Avoid strenuous activities, such as bicycle riding, jogging, weight lifting, or aerobic exercise, until your doctor says it is okay.     · You may be able to take showers (unless you have a drain near your incision) 24 to 48 hours after surgery. Pat the incision dry. Do not take a bath for the first 2 weeks, or until your doctor tells you it is okay. If you have a drain near your incision, follow your doctor's instructions.     · You may drive when you are no longer taking pain medicine and can quickly move your foot from the gas pedal to the brake. You must also be able to sit comfortably for a long period of time, even if you do not plan on going far. You might get caught in traffic.     · You will probably be able to go back to work in 1 to 3 weeks.  If you had an open surgery, it may take 3 to 4 weeks.     · Your doctor will tell you when you can have sex again.   Diet    · You can eat your normal diet. If your stomach is upset, try bland, low-fat foods like plain rice, broiled chicken, toast, and yogurt.     · Drink plenty of fluids (unless your doctor tells you not to).     · You may notice that your bowel movements are not regular right after your surgery. This is common. Try to avoid constipation and straining with bowel movements. You may want to take a fiber supplement every day. If you have not had a bowel movement after a couple of days, ask your doctor about taking a mild laxative. Medicines    · Your doctor will tell you if and when you can restart your medicines. He or she will also give you instructions about taking any new medicines.     · If you take blood thinners, such as warfarin (Coumadin), clopidogrel (Plavix), or aspirin, be sure to talk to your doctor. He or she will tell you if and when to start taking those medicines again. Make sure that you understand exactly what your doctor wants you to do.     · If your appendix ruptured, you will need to take antibiotics. Take them as directed. Do not stop taking them just because you feel better. You need to take the full course of antibiotics.     · Be safe with medicines. Take pain medicines exactly as directed. ? If the doctor gave you a prescription medicine for pain, take it as prescribed. ? If you are not taking a prescription pain medicine, take an over-the-counter medicine such as acetaminophen (Tylenol), ibuprofen (Advil, Motrin), or naproxen (Aleve). Read and follow all instructions on the label. ? Do not take two or more pain medicines at the same time unless the doctor told you to. Many pain medicines have acetaminophen, which is Tylenol. Too much Tylenol can be harmful.     · If you think your pain medicine is making you sick to your stomach:  ? Take your medicine after meals (unless your doctor has told you not to). ? Ask your doctor for a different pain medicine.    [facility] care    · If you had an open surgery, you may have staples in your incision. The doctor will take these out in 7 to 10 days.     · If you have strips of tape on the incision, leave the tape on for a week or until it falls off.     · You may wash the area with warm, soapy water 24 to 48 hours after your surgery, unless your doctor tells you not to. Pat the area dry.     · Keep the area clean and dry. You may cover it with a gauze bandage if it weeps or rubs against clothing. Change the bandage every day.     · If your appendix ruptured, you may have an incision with packing in it. Change the packing as often as your doctor tells you to. ? Packing changes may hurt at first. Taking pain medicine about half an hour before you change the dressing can help. ? If your dressing sticks to your wound, try soaking it with warm water for about 10 minutes before you remove it. You can do this in the shower or by placing a wet washcloth over the dressing. ? Remove the old packing and flush the incision with water. Gently pat the top area dry. ? The size of the incision determines how much gauze you need to put inside. Fold the gauze over once, but do not wad it up so that it hurts. Put it in the wound carefully. You want to keep the sides of the wound from touching. A cotton swab may help you push the gauze in as needed. ? Put a gauze pad over the wound, and tape it down. ? You may notice greenish gray fluid seeping from your wound as you start to heal. This is normal. It is a sign that your wound is healing. Follow-up care is a key part of your treatment and safety. Be sure to make and go to all appointments, and call your doctor if you are having problems. It's also a good idea to know your test results and keep a list of the medicines you take. When should you call for help? Call 911 anytime you think you may need emergency care. For example, call if:    · You passed out (lost consciousness).     · You are short of breath. Jaron Becerra  Call your doctor now or seek immediate medical care if:    · You are sick to your stomach and cannot drink fluids.     · You cannot pass stools or gas.     · You have pain that does not get better when you take your pain medicine.     · You have signs of infection, such as:  ? Increased pain, swelling, warmth, or redness. ? Red streaks leading from the wound. ? Pus draining from the wound. ? A fever.     · You have loose stitches, or your incision comes open.     · Bright red blood has soaked through the bandage over your incision.     · You have signs of a blood clot in your leg (called a deep vein thrombosis), such as:  ? Pain in your calf, back of knee, thigh, or groin. ? Redness and swelling in your leg or groin.    Watch closely for any changes in your health, and be sure to contact your doctor if you have any problems. Where can you learn more? Go to http://garcia-jonnie.info/. Enter K336 in the search box to learn more about \"Appendectomy: What to Expect at Home. \"  Current as of: November 7, 2018  Content Version: 12.2  © 8550-9330 [employer]. Care instructions adapted under license by [employer] (which disclaims liability or warranty for this information). If you have questions about a medical condition or this instruction, always ask your healthcare professional. [address] any warranty or liability for your use of this information.          ___________________________________________________________________________________________________________________________________

## 2020-02-23 NOTE — PROGRESS NOTES
Discharge instructions given to patient and . Patient verbalized understanding of all instructions. Gave prescription medication. IV removed.

## 2020-02-23 NOTE — ANESTHESIA POSTPROCEDURE EVALUATION
Procedure(s):  APPENDECTOMY LAPAROSCOPIC. general    Anesthesia Post Evaluation      Multimodal analgesia: multimodal analgesia not used between 6 hours prior to anesthesia start to PACU discharge  Patient location during evaluation: PACU  Patient participation: complete - patient participated  Level of consciousness: awake and alert  Pain management: adequate  Airway patency: patent  Anesthetic complications: no  Cardiovascular status: hemodynamically stable  Respiratory status: acceptable  Hydration status: acceptable        Vitals Value Taken Time   /77 2/23/2020 11:11 AM   Temp 36.6 °C (97.9 °F) 2/23/2020 11:07 AM   Pulse 92 2/23/2020 11:14 AM   Resp 18 2/23/2020 11:07 AM   SpO2 100 % 2/23/2020 11:14 AM   Vitals shown include unvalidated device data.

## 2020-02-23 NOTE — H&P
H&P/Consult Note/Progress Note/Office Note:   May Cardenas  MRN: [id number]  :1965  Age:54 y.o. General Surgery Consult ordered by: Dr. Viky Daniels  Reason for General Surgery Consult: Abdominal pain/ Acute appendicitis    HPI: May Cardenas is a 47 y.o. female with a past medical history of asthma, anxiety, GERD, and HTN who presented to the ED 20 with c/o RLQ abdominal pain and nausea x 24 hours. Pt also reports she has been experiencing loose stools. Patient reports worsened abdominal pain with movement and palpation. WBC 10.8. Surgery is asked to evaluate the patient for decision for surgery for appendectomy.     Past surgical hx includes hysterectomy     Past Medical History:   Diagnosis Date    Anxiety     Asthma     Depression     GERD (gastroesophageal reflux disease)     Headache     Hx of seasonal allergies     Hypertension      Past Surgical History:   Procedure Laterality Date    HX HYSTERECTOMY      total     HX OOPHORECTOMY       Current Facility-Administered Medications   Medication Dose Route Frequency    sodium chloride (NS) flush 5-40 mL  5-40 mL IntraVENous Q8H    sodium chloride (NS) flush 5-40 mL  5-40 mL IntraVENous PRN    naloxone (NARCAN) injection 0.4 mg  0.4 mg IntraVENous PRN    diphenhydrAMINE (BENADRYL) injection 12.5 mg  12.5 mg IntraVENous Q4H PRN    ondansetron (ZOFRAN) injection 4 mg  4 mg IntraVENous Q4H PRN    dextrose 5% - 0.45% NaCl with KCl 20 mEq/L infusion  75 mL/hr IntraVENous CONTINUOUS    acetaminophen (TYLENOL) tablet 650 mg  650 mg Oral Q4H PRN    oxyCODONE-acetaminophen (PERCOCET) 5-325 mg per tablet 1 Tab  1 Tab Oral Q4H PRN    morphine injection 2 mg  2 mg IntraVENous Q4H PRN     Aspirin; Morphine; Escitalopram oxalate; and Hydrocodone-acetaminophen  Social History     Socioeconomic History    Marital status:      Spouse name: Not on file    Number of children: Not on file    Years of education: Not on file    Highest education level: Not on file   Tobacco Use    Smoking status: Never Smoker    Smokeless tobacco: Never Used   Substance and Sexual Activity    Alcohol use: No    Drug use: No    Sexual activity: Yes     Partners: Male     Birth control/protection: None     Social History     Tobacco Use   Smoking Status Never Smoker   Smokeless Tobacco Never Used     Family History   Problem Relation Age of Onset    Hypertension Mother     Hypertension Father     Other Father         epilepsy    Heart Disease Father     Diabetes Sister      ROS: The patient has no difficulty with chest pain or shortness of breath. No fever or chills. Comprehensive review of systems was otherwise unremarkable except as noted above. Physical Exam:   Visit Vitals  /87   Pulse 100   Temp 97.9 °F (36.6 °C)   Resp 20   Ht 5' 5\" (1.651 m)   Wt 318 lb (144.2 kg)   LMP  (LMP Unknown)   SpO2 95%   BMI 52.92 kg/m²     Vitals:    02/22/20 2250 02/22/20 2303 02/23/20 0001 02/23/20 0354   BP: (!) 155/91 129/81 142/90 134/87   Pulse: 79 71 73 100   Resp:   19 20   Temp:   98 °F (36.7 °C) 97.9 °F (36.6 °C)   SpO2: 96% 95% 95% 95%   Weight:       Height:         No intake/output data recorded. 02/21 1901 - 02/23 0700  In: 1377 [I.V.:1377]  Out: 300 [Urine:300]    Constitutional: Alert, cooperative, no acute distress; appears stated age    Eyes: Sclera are clear. EOMs intact  ENMT: no external lesions gross hearing normal; no obvious neck masses, no ear or lip lesions, nares normal  CV: RRR. Normal perfusion  Resp: No JVD. Breathing is  non-labored; no audible wheezing. GI: Obese, soft and non-distended, ttp RLQ    Musculoskeletal: unremarkable with normal function. No embolic signs or cyanosis.    Neuro:  Oriented; moves all 4; no focal deficits  Psychiatric: normal affect and mood, no memory impairment    Recent vitals (if inpt):  Patient Vitals for the past 24 hrs:   BP Temp Pulse Resp SpO2 Height Weight   02/23/20 0354 134/87 97.9 °F (36.6 °C) 100 20 95 %     02/23/20 0001 142/90 98 °F (36.7 °C) 73 19 95 %     02/22/20 2303 129/81  71  95 %     02/22/20 2250 (!) 155/91  79  96 %     02/22/20 2248 (!) 153/91  79  94 %     02/22/20 2216 147/81  85  96 %     02/22/20 2201 160/90  95  97 %     02/22/20 2158 (!) 187/100  96  98 %     02/22/20 2107 (!) 224/122 98.1 °F (36.7 °C) (!) 107 20 97 % 5' 5\" (1.651 m) 318 lb (144.2 kg)       Labs:  Recent Labs     02/22/20 2130   WBC 10.8   HGB 14.6         K 3.8      CO2 28   BUN 12   CREA 1.05*   *   TBILI 0.6   SGOT 21   ALT 51   *   LPSE 89       Lab Results   Component Value Date/Time    WBC 10.8 02/22/2020 09:30 PM    HGB 14.6 02/22/2020 09:30 PM    PLATELET 923 17/94/4982 09:30 PM    Sodium 139 02/22/2020 09:30 PM    Potassium 3.8 02/22/2020 09:30 PM    Chloride 104 02/22/2020 09:30 PM    CO2 28 02/22/2020 09:30 PM    BUN 12 02/22/2020 09:30 PM    Creatinine 1.05 (H) 02/22/2020 09:30 PM    Glucose 157 (H) 02/22/2020 09:30 PM    INR 1.1 01/20/2012 05:35 AM    aPTT 23.8 01/20/2012 05:35 AM    Bilirubin, total 0.6 02/22/2020 09:30 PM    AST (SGOT) 21 02/22/2020 09:30 PM    ALT (SGPT) 51 02/22/2020 09:30 PM    Alk. phosphatase 143 (H) 02/22/2020 09:30 PM    Lipase 89 02/22/2020 09:30 PM       CT Results  (Last 48 hours)               02/22/20 2228  CT ABD PELV W CONT Final result    Impression:  IMPRESSION:   1. Pericecal inflammation surrounding a small narrow tubular structure that may   represent the appendix. If this is the appendix, it is not dilated; however   there is no air within the lumen. These findings are thought to be equivocal for   appendicitis. 2. The remainder of the abdomen and pelvis is unremarkable without other   explanation for the patient's abdominal pain. Narrative:  CT OF THE ABDOMEN AND PELVIS WITH CONTRAST.        CLINICAL INDICATION: Right lower quadrant abdominal pain       PROCEDURE: Serial thin section axial images obtained from the lung bases through   the proximal femurs following the administration of 100 cc of Isovue 370   intravenous contrast.  Radiation dose reduction techniques were used for this   study. Our CT scanners use one or all of the following: Automated exposure   control, adjusted of the mA and/or kV according to patient size, iterative   reconstruction       COMPARISON: No prior       FINDINGS:         The lung bases are clear. CT ABDOMEN: The liver and gallbladder are normal. The kidneys are symmetric in   size and contrast enhancement. There is no hydronephrosis. A 2.3 cm right   adrenal nodule is present. The left adrenal gland is normal. The spleen is   normal. Pancreas is unremarkable. There is pericecal fat inflammation noted in   the right lower abdominal quadrant. The appendix itself is not definitively   appreciated. What is thought to be the appendix is a small, tubular structure   that is narrow in caliber measuring only 6 mm. However there is no air in the   lumen. This is thought to be equivocal for appendicitis. The terminal ileum is   unremarkable. The remainder the bowel is normal in course, caliber, and wall   thickness. CT PELVIS: The bladder is incompletely distended. There is no inguinal hernia or   adenopathy. The rectum is unremarkable. No free fluid accumulating dependently   in the pelvis. No aggressive osseous lesions identified. chest X-ray      I reviewed recent labs, recent radiologic studies, and pertinent records including other doctor notes if needed. I independently reviewed radiology images for studies I described above or studies I have ordered.    Admission date (for inpatients): 2/22/2020   * No surgery date entered *  Procedure(s):  APPENDECTOMY LAPAROSCOPIC    ASSESSMENT/PLAN:  Problem List  Date Reviewed: 1/23/2020          Codes Class Noted    Acute appendicitis ICD-10-CM: K35.80  ICD-9-CM: 540.9  2/22/2020 Type 2 diabetes mellitus with diabetic neuropathy (HCC) ICD-10-CM: E11.40  ICD-9-CM: 250.60, 357.2  1/23/2020        Depression, major, recurrent, mild (HCC) ICD-10-CM: F33.0  ICD-9-CM: 296.31  6/17/2019        Multiple nevi ICD-10-CM: D22.9  ICD-9-CM: 216.9  6/17/2019        Elevated TSH ICD-10-CM: R79.89  ICD-9-CM: 794.5  6/17/2019        Atherosclerosis of coronary artery ICD-10-CM: I25.10  ICD-9-CM: 414.00  10/16/2017    Overview Addendum 5/9/2019  1:35 PM by Sonia Farrell NP     Overview:   nonobs by cath 2015, moderate disease             Carpal tunnel syndrome on both sides ICD-10-CM: G56.03  ICD-9-CM: 354.0  10/16/2017        Chronic fatigue ICD-10-CM: R53.82  ICD-9-CM: 780.79  10/16/2017        Migraine without aura and without status migrainosus, not intractable ICD-10-CM: G99.152  ICD-9-CM: 346.10  10/16/2017        Esophageal dysphagia ICD-10-CM: R13.10  ICD-9-CM: 787.20  6/14/2017    Overview Signed 10/16/2017 11:23 AM by Dyllan ALLEN     Last Assessment & Plan:   Dysphagia to solid foods, particularly rice, requiring her to vomit the food back up. Discussed a stricture requiring dilation, vs motility dysfunction and would need an esophageal manometry. Will schedule her for barium esophagram and EGD. GERD (gastroesophageal reflux disease) ICD-10-CM: K21.9  ICD-9-CM: 530.81  6/14/2017    Overview Signed 10/16/2017 11:23 AM by Mateusz Dunn     Last Assessment & Plan:   New for her over the last 6 months with epigastric pain and nausea. Protonix not helping. Encouraged dietary/lifestyle modifications. Consider h pylori. Also discussed Doran's esophagus, esophagitis, an ulcer. Will schedule for EGD for further evaluation. GB normal on ultrasound- if EGD normal, consider hida scan.   Risks and benefits of endoscopy were explained to the patient, which include, but are not limited to risk of bleeding, infection, perforation, and/or missed lesion, in addition to the risk of anesthesia which include pulmonary complications requiring intubation and cardiac complications such as arrhythmia, hypotension, and/or death. Slow transit constipation ICD-10-CM: K59.01  ICD-9-CM: 564.01  6/14/2017    Overview Signed 10/16/2017 11:23 AM by Jean-Paul Fuller     Last Assessment & Plan:   rx sent for colace BID and miralax daily. If this does not work for her, will try [employer] or [id number]. Has never had a colonoscopy in the past.  Due for screening. Will be scheduled. PGF with colon cancer. Risks and benefits of colonoscopy were explained to the patient, which include, but are not limited to risk of bleeding, infection, perforation, and/or missed lesion, in addition to the risk of anesthesia which include pulmonary complications requiring intubation and cardiac complications such as arrhythmia, hypotension, and/or death. Elevated LFTs ICD-10-CM: R94.5  ICD-9-CM: 790.6  6/18/2015        Hypercalcemia ICD-10-CM: E83.52  ICD-9-CM: 275.42  6/18/2015        Hyperlipidemia ICD-10-CM: E78.5  ICD-9-CM: 272.4  6/18/2015        Type 2 diabetes mellitus without complication, without long-term current use of insulin (HCC) ICD-10-CM: E11.9  ICD-9-CM: 250.00  6/18/2015        Vitamin D deficiency ICD-10-CM: E55.9  ICD-9-CM: 268.9  6/8/2015        Mild intermittent asthma without complication TIZ-61-YM: U47.49  ICD-9-CM: 493.90  6/8/2015        Essential hypertension ICD-10-CM: I10  ICD-9-CM: 401.9  6/7/2015        Morbid obesity with BMI of 50.0-59.9, adult ([facility] ICD-10-CM: E66.01, Z68.43  ICD-9-CM: 278.01, V85.43  6/7/2015        Tension type headache ICD-10-CM: E38.664  ICD-9-CM: 339.10  6/7/2015            Active Problems:    Acute appendicitis (2/22/2020)         Plan:  IV Abx - Mefoxin  NPO  Verify Consent for Laparoscopic, possible open, appendectomy by Dr. Freddy Burr 2/23/20  Decision for surgery laparoscopic appendectomy possible open procedure.         Mayte Frommel, NP    I have personally performed a face-to-face diagnostic evaluation and management  service on this patient. I have independently seen the patient. I have independently obtained the above history from the patient/family. I have independently examined the patient with above findings. I have independently reviewed data/labs for this patient and developed the above plan of care.     [place] [id number] surgical [facility]

## 2020-02-24 ENCOUNTER — PATIENT OUTREACH (OUTPATIENT)
Dept: CASE MANAGEMENT | Age: 55
End: 2020-02-24

## 2020-02-24 NOTE — PROGRESS NOTES
This note will not be viewable in [address] Initial [facility] outreach attempt to patient's home/mobile number was unsuccessful. Left message to return call. Will attempt second outreach within 24 hours.

## 2020-02-25 ENCOUNTER — PATIENT OUTREACH (OUTPATIENT)
Dept: CASE MANAGEMENT | Age: 55
End: 2020-02-25

## 2020-02-25 NOTE — ADT AUTH CERT NOTES
DOWNGRADED TO OBSERVATION 
 
ONCE RECEIVED AND COMPLETED, PLEASE FAX BACK CONFIRMATION AND NEW OBS AUTH# OR WHETHER OR NOT OBS REQUIRES AN AUTH.  
 
Isai De La Rosa

## 2020-02-25 NOTE — PROGRESS NOTES
This note will not be viewable in [address] Second [facility] outreach attempt made to patient's home/mobile number was unsuccessful. Left message to return call. Will attempt third outreach within 5 business days.

## 2020-02-25 NOTE — DISCHARGE SUMMARY
Discharge Note    Eyad Bello  Admission date:  2/22/2020  Discharge date:  2/23/2020    Admitting Diagnosis:  Acute appendicitis [K35.80]    Discharge Diagnoses:    Hospital Problems  Date Reviewed: 1/23/2020          Codes Class Noted POA    * (Principal) Acute appendicitis ICD-10-CM: K35.80  ICD-9-CM: 540.9  2/22/2020 Unknown              Consultations: None    Procedures/studies:    Procedure(s):  APPENDECTOMY LAPAROSCOPIC              Hospital Course: Admitted with uncomplicated appendicitis without rupture. Lap appy tolerated well. Patient discharged. Physical Exam:   GEN: well developed and in no acute distress   HEENT: PERRL, EOMI, no alar flaring or epistaxis, oral mucosa moist without cyanosis,   NECK: no JVD, no retractions, no thyromegaly or masses,   LUNGS: resp even/unlab on room air   HEART: RRR with no M,G,R;  ABDOMEN: soft with no tenderness, no peritoneal signs, rebound, or guarding  EXTREMITIES: warm with no cyanosis, no upper or lower extremity edema  SKIN: no jaundice; rashes, sores, or ecchymoses  NEURO:  Awake, alert, oriented x3, no distress, nonfocal exam.    Recent Labs     02/22/20  2130   WBC 10.8   HGB 14.6   HCT 43.0        Recent Labs     02/22/20  2130      K 3.8      CO2 28   BUN 12   CREA 1.05*       Discharge Medications: Cannot display discharge medications since this patient is not currently admitted. Diet: Regular  Activity: No lifting over 10 lbs for 2 weeks. Follow-up: 2 weeks.     Andre Mckinley DO

## 2020-02-26 ENCOUNTER — PATIENT OUTREACH (OUTPATIENT)
Dept: CASE MANAGEMENT | Age: 55
End: 2020-02-26

## 2020-02-26 NOTE — PROGRESS NOTES
This note will not be viewable in [address] Transition of Care Discharge Follow-up Questionnaire   Date/Time of Call:   02/26/2020   1123am   What was the patient hospitalized for? Acute Appendicitis     Does the patient understand his/her diagnosis and/or treatment and what happened during the hospitalization? Yes, spoke with patient understanding of diagnosis and treatment; and is agreeable to call. Patient states doing well. Patient denies any c/o during this phone call. Patient states grateful for the phone call. Did the patient receive discharge instructions? Yes    CM Assessed Risk for Readmission:       Patient stated Risk for Readmission:      Moderate to high  r/t diagnosis ,comorbidities and/or complication of surgery. None Stated   Review any discharge instructions (see discharge instructions/AVS in [facility]). Ask patient if they understand these. Do they have any questions? Reviewed, understanding is stated, no questions at this time       Were home services ordered (nursing, PT, OT, ST, etc.)? No   If so, has the first visit occurred? If not, why? (Assist with coordination of services if necessary.)   N/A     Was any DME ordered? No     If so, has it been received? If not, why?  (Assist patient in obtaining DME orders &/or equipment if necessary.) N/A   Complete a review of all medications (new, continued and discontinued meds per the D/C instructions and medication tab in [facility]). Completed  START taking:  oxyCODONE-acetaminophen 5-325 mg per  tablet (Percocet)     Were all new prescriptions filled? If not, why?  (Assist patient in obtaining medications if necessary  escalate for CCM &/or SW if ongoing issues are verbalized by pt or anticipated)   Yes   Does the patient understand the purpose and dosing instructions for all medications?   (If patient has questions, provide explanation and education.)   Yes      Does the patient have any problems in performing ADLs? (If patient is unable to perform ADLs  what is the limiting factor(s)? Do they have a support system that can assist? If no support system is present, discuss possible assistance that they may be able to obtain. Escalate for CCM/SW if ongoing issues are verbalized by pt or anticipated)   Independent with ADLs at baseline      Does the patient have all follow-up appointments scheduled? 7 day f/up with PCP?   (f/up with PCP may be w/in 14 days if patient has a f/up with their specialist w/in 7 days)    7-14 day f/up with specialist?   (or per discharge instructions)    If f/up has not been made  what actions has the care coordinator made to accomplish this? Has transportation been arranged? Yes        Tierra Clayton NP 3/5/2020         Patient states willl call Cony Mckinley to get a follow-up appt in 2 weeks. Yes, no transportation issues at this time. Any other questions or concerns expressed by the patient? No other needs or concerns identified. Contact information for Care Coordinator was given, instructed to call with new questions or concerns. Schedule next appointment with AYDIN Sim or refer to RN Case Manager/ per the workflow guidelines. When is care coordinators next follow-up call scheduled? If referred for CCM  what RN care manager was the referral assigned?      Care Coordinator will foloow workflow guidelines,        Within 14 days   [facility] Call Completed By: Truong Moura LPN  Care Coordinator

## 2020-03-09 ENCOUNTER — HOSPITAL ENCOUNTER (OUTPATIENT)
Dept: ULTRASOUND IMAGING | Age: 55
Discharge: HOME | End: 2020-03-09

## 2020-03-09 DIAGNOSIS — R10.11 RIGHT UPPER QUADRANT PAIN: ICD-10-CM

## 2020-03-09 NOTE — PROGRESS NOTES
May notify. No gallstones or gallbladder wall thickening. Fatty infiltrate of liver is present.  Will see her back end of the month

## 2020-03-11 ENCOUNTER — PATIENT OUTREACH (OUTPATIENT)
Dept: CASE MANAGEMENT | Age: 55
End: 2020-03-11

## 2020-03-11 PROBLEM — Z90.49 S/P APPENDECTOMY: Status: ACTIVE | Noted: 2020-03-11

## 2020-03-11 NOTE — PROGRESS NOTES
This note will not be viewable in [address] Transitions of Care  Follow up Outreach Note   Outreach type Phone call: spoke with patient  Home visit:   Date/Time of Outreach: 03/11/2020 0924am     Has patient attended PCP or specialist follow-up appointments since last contact? What was outcome of appointment? When is next follow-up scheduled? Patient states doing well. Patient  reports attending follow with PCP Dr. Henry Osorio on 03/02/2020. Patient states upcoming appointment with Mis Berry DO on 3/11/2020. Review medications. Any medication changes since last outreach? Does patient have any questions or issues related to their medications? None stated      Not at this time. Home health active? If yes  any issue? Progress? No       Referrals needed?  (CM, SW, HH, etc. )   No    Other issues/Miscellaneous? (Transportation, access to meals, ability to perform ADLs, adequate caregiver support, etc.) No other needs or concerns at this time. Patient states her gratitude for follow up. Next Outreach Scheduled?     Graduation from program?   N/A    Yes        Next Steps/Goals (if applicable):   N/A     Outreach completed by:   Shavonne Riddle LPN  Care Coordinator

## 2020-05-08 ENCOUNTER — HOSPITAL ENCOUNTER (EMERGENCY)
Age: 55
Discharge: HOME | End: 2020-05-08
Attending: EMERGENCY MEDICINE
Payer: COMMERCIAL

## 2020-05-08 ENCOUNTER — APPOINTMENT (OUTPATIENT)
Dept: GENERAL RADIOLOGY | Age: 55
End: 2020-05-08
Attending: EMERGENCY MEDICINE
Payer: COMMERCIAL

## 2020-05-08 VITALS
OXYGEN SATURATION: 98 % | HEIGHT: 65 IN | RESPIRATION RATE: 18 BRPM | BODY MASS INDEX: 48.82 KG/M2 | TEMPERATURE: 98.2 F | WEIGHT: 293 LBS | HEART RATE: 84 BPM | SYSTOLIC BLOOD PRESSURE: 162 MMHG | DIASTOLIC BLOOD PRESSURE: 98 MMHG
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
## 2020-05-08 DIAGNOSIS — M25.552 LEFT HIP PAIN: ICD-10-CM

## 2020-05-08 DIAGNOSIS — M25.572 ACUTE LEFT ANKLE PAIN: ICD-10-CM

## 2020-05-08 DIAGNOSIS — M25.562 ACUTE PAIN OF LEFT KNEE: ICD-10-CM

## 2020-05-08 PROCEDURE — 73630 X-RAY EXAM OF FOOT: CPT

## 2020-05-08 PROCEDURE — 73610 X-RAY EXAM OF ANKLE: CPT

## 2020-05-08 PROCEDURE — 99284 EMERGENCY DEPT VISIT MOD MDM: CPT

## 2020-05-08 PROCEDURE — 74011000250 HC RX REV CODE- 250: Performed by: NURSE PRACTITIONER

## 2020-05-08 PROCEDURE — 74011250637 HC RX REV CODE- 250/637: Performed by: NURSE PRACTITIONER

## 2020-05-08 PROCEDURE — 73562 X-RAY EXAM OF KNEE 3: CPT

## 2020-05-08 PROCEDURE — 73502 X-RAY EXAM HIP UNI 2-3 VIEWS: CPT

## 2020-05-08 RX ORDER — OXYCODONE AND ACETAMINOPHEN 5; 325 MG/1; MG/1
1 TABLET ORAL
Status: COMPLETED | OUTPATIENT
Start: 2020-05-08 | End: 2020-05-08

## 2020-05-08 RX ORDER — TRAMADOL HYDROCHLORIDE 50 MG/1
50 TABLET ORAL
Qty: 12 TAB | Refills: 0 | Status: SHIPPED | OUTPATIENT
Start: 2020-05-08 | End: 2020-05-11

## 2020-05-08 RX ORDER — LIDOCAINE 4 G/100G
1 PATCH TOPICAL
Status: DISCONTINUED | OUTPATIENT
Start: 2020-05-08 | End: 2020-05-08 | Stop reason: HOSPADM

## 2020-05-08 RX ADMIN — OXYCODONE HYDROCHLORIDE AND ACETAMINOPHEN 1 TABLET: 5; 325 TABLET ORAL at 11:40

## 2020-05-08 NOTE — DISCHARGE INSTRUCTIONS
Patient Education        Preventing Falls: Care Instructions  Your Care Instructions    Getting around your home safely can be a challenge if you have injuries or health problems that make it easy for you to fall. Loose rugs and furniture in walkways are among the dangers for many older people who have problems walking or who have poor eyesight. People who have conditions such as arthritis, osteoporosis, or dementia also have to be careful not to fall. You can make your home safer with a few simple measures. Follow-up care is a key part of your treatment and safety. Be sure to make and go to all appointments, and call your doctor if you are having problems. It's also a good idea to know your test results and keep a list of the medicines you take. How can you care for yourself at home? Taking care of yourself  · You may get dizzy if you do not drink enough water. To prevent dehydration, drink plenty of fluids, enough so that your urine is light yellow or clear like water. Choose water and other caffeine-free clear liquids. If you have kidney, heart, or liver disease and have to limit fluids, talk with your doctor before you increase the amount of fluids you drink. · Exercise regularly to improve your strength, muscle tone, and balance. Walk if you can. Swimming may be a good choice if you cannot walk easily. · Have your vision and hearing checked each year or any time you notice a change. If you have trouble seeing and hearing, you might not be able to avoid objects and could lose your balance. · Know the side effects of the medicines you take. Ask your doctor or pharmacist whether the medicines you take can affect your balance. Sleeping pills or sedatives can affect your balance. · Limit the amount of alcohol you drink. Alcohol can impair your balance and other senses. · Ask your doctor whether calluses or corns on your feet need to be removed.  If you wear loose-fitting shoes because of calluses or corns, you can lose your balance and fall. · Talk to your doctor if you have numbness in your feet. Preventing falls at home  · Remove raised doorway thresholds, throw rugs, and clutter. Repair loose carpet or raised areas in the floor. · Move furniture and electrical cords to keep them out of walking paths. · Use nonskid floor wax, and wipe up spills right away, especially on ceramic tile floors. · If you use a walker or cane, put rubber tips on it. If you use crutches, clean the bottoms of them regularly with an abrasive pad, such as steel wool. · Keep your house well lit, especially [place], and outside walkways. Use night-lights in areas such as hallways and bathrooms. Add extra light switches or use remote switches (such as switches that go on or off when you clap your hands) to make it easier to turn lights on if you have to get up during the night. · Install sturdy handrails on stairways. · Move items in your cabinets so that the things you use a lot are on the lower shelves (about waist level). · Keep a cordless phone and a flashlight with new batteries by your bed. If possible, put a phone in each of the main rooms of your house, or carry a cell phone in case you fall and cannot reach a phone. Or, you can wear a device around your neck or wrist. You push a button that sends a signal for help. · Wear low-heeled shoes that fit well and give your feet good support. Use footwear with nonskid soles. Check the heels and soles of your shoes for wear. Repair or replace worn heels or soles. · Do not wear socks without shoes on wood floors. · Walk on the grass when the sidewalks are slippery. If you live in an area that gets snow and ice in the winter, sprinkle salt on slippery steps and sidewalks. Preventing falls in the bath  · Install grab bars and nonskid mats inside and outside your shower or tub and near the toilet and sinks. · Use shower chairs and bath benches.   · Use a hand-held shower head that will allow you to sit while showering. · Get into a tub or shower by putting the weaker leg in first. Get out of a tub or shower with your strong side first.  · Repair loose toilet seats and consider installing a raised toilet seat to make getting on and off the toilet easier. · Keep your bathroom door unlocked while you are in the shower. Where can you learn more? Go to http://garcia-jonnie.info/  Enter G117 in the search box to learn more about \"Preventing Falls: Care Instructions. \"  Current as of: August 6, 2019Content Version: 12.4  © 8226-4571 [employer]. Care instructions adapted under license by [employer] (which disclaims liability or warranty for this information). If you have questions about a medical condition or this instruction, always ask your healthcare professional. [place] [address] any warranty or liability for your use of this information. Patient Education        Learning About RICE (Rest, Ice, Compression, and Elevation)  What is RICE? RICE is a way to care for an injury. RICE helps relieve pain and swelling. It may also help with healing and flexibility. RICE stands for:  · R est and protect the injured or sore area. · I ce or a cold pack used as soon as possible. · C ompression, or wrapping the injured or sore area with an elastic bandage. · E levation (propping up) the injured or sore area. How do you do RICE? You can use RICE for home treatment when you have general aches and pains or after an injury or surgery. Rest  · Do not put weight on the injury for at least 24 to 48 hours. · Use crutches for a badly sprained knee or ankle. · Support a sprained wrist, elbow, or shoulder with a sling. Ice  · Put ice or a cold pack on the injury right away to reduce pain and swelling. Frozen vegetables will also work as an ice pack. Put a thin cloth between the ice or cold pack and your skin.  The cloth protects the injured area from getting too cold. · Use ice for 10 to 15 minutes at a time for the first 48 to 72 hours. Compression  · Use compression for sprains, strains, and surgeries of the arms and legs. · Wrap the injured area with an elastic bandage or compression sleeve to reduce swelling. · Don't wrap it too tightly. If the area below it feels numb, tingles, or feels cool, loosen the wrap. Elevation  · Use elevation for areas of the body that can be propped up, such as arms and legs. · Prop up the injured area on pillows whenever you use ice. Keep it propped up anytime you sit or lie down. · Try to keep the injured area at or above the level of your heart. This will help reduce swelling and bruising. Where can you learn more? Go to http://garcia-jonnie.info/  Enter I463 in the search box to learn more about \"Learning About RICE (Rest, Ice, Compression, and Elevation). \"  Current as of: June 26, 2019Content Version: 12.4  © 9106-1231 [employer]. Care instructions adapted under license by [employer] (which disclaims liability or warranty for this information). If you have questions about a medical condition or this instruction, always ask your healthcare professional. [address] any warranty or liability for your use of this information. home with family    Use ice and rest to ease pain.   Follow with family doctor for continued care

## 2020-05-08 NOTE — ED NOTES
I have reviewed discharge instructions with the patient. The patient verbalized understanding. Patient left ED via Discharge Method: wheelchair to Home with family member    Opportunity for questions and clarification provided. Patient given 1 scripts. No esign        To continue your aftercare when you leave the hospital, you may receive an automated call from our care team to check in on how you are doing. This is a free service and part of our promise to provide the best care and service to meet your aftercare needs.  If you have questions, or wish to unsubscribe from this service please call [phone]. Thank you for Choosing our [facility] Emergency Department.

## 2020-05-08 NOTE — ED TRIAGE NOTES
Pt to triage in wheelchair wearing mask. Ems called to home for fall. C/o left knee and left foot pain. Also c/o left forearm pain. No LOC, no blood thinners. Able to bear weight. No deformity. Pt states she tripped and fell. Pt hypertensive with hx of hypertension and is taking lisinopril.

## 2020-05-08 NOTE — ED PROVIDER NOTES
After I removed 3 chairs blocking pt who was seating in a wheelchair, I then assisted her out of the wheelchair to stretcher to begin exam.  46 y/o f to ed with hsx asthma, anxiety, htn, gerd for eval of left lower extremity pain after a fall this am. Says she was walking on her rock walkway, slipped and fell landing onto her left side. Denies strking her head. Says she was unable to get up on her own and had to wait a while for someone to hear her calling them. Has not been able to bear wt since fall due to pain left knee and ankle.              Past Medical History:   Diagnosis Date    Anxiety     Asthma     Depression     GERD (gastroesophageal reflux disease)     Headache     Hx of seasonal allergies     Hypertension        Past Surgical History:   Procedure Laterality Date    HX APPENDECTOMY  02/22/2020    HX HYSTERECTOMY  2014    total     HX OOPHORECTOMY           Family History:   Problem Relation Age of Onset    Hypertension Mother     Hypertension Father     Other Father         epilepsy    Heart Disease Father     Diabetes Sister        Social History     Socioeconomic History    Marital status:      Spouse name: Not on file    Number of children: Not on file    Years of education: Not on file    Highest education level: Not on file   Occupational History    Not on file   Social Needs    Financial resource strain: Not on file    Food insecurity     Worry: Not on file     Inability: Not on file    Transportation needs     Medical: Not on file     Non-medical: Not on file   Tobacco Use    Smoking status: Never Smoker    Smokeless tobacco: Never Used   Substance and Sexual Activity    Alcohol use: No    Drug use: No    Sexual activity: Yes     Partners: Male     Birth control/protection: None   Lifestyle    Physical activity     Days per week: Not on file     Minutes per session: Not on file    Stress: Not on file   Relationships    Social connections     Talks on phone: Not on file     Gets together: Not on file     Attends Hinduism service: Not on file     Active member of club or organization: Not on file     Attends meetings of clubs or organizations: Not on file     Relationship status: Not on file    Intimate partner violence     Fear of current or ex partner: Not on file     Emotionally abused: Not on file     Physically abused: Not on file     Forced sexual activity: Not on file   Other Topics Concern    Not on file   Social History Narrative    Not on file         ALLERGIES: Aspirin; Morphine; Escitalopram oxalate; and Hydrocodone-acetaminophen    Review of Systems   Constitutional: Negative for diaphoresis and fever. HENT: Negative for facial swelling and mouth sores. Eyes: Negative for discharge and redness. Respiratory: Negative for cough and wheezing. Cardiovascular: Negative for chest pain and palpitations. Gastrointestinal: Negative for nausea and vomiting. Genitourinary: Negative for flank pain and pelvic pain. Musculoskeletal: Positive for back pain (chronic), gait problem and myalgias. Negative for neck pain and neck stiffness. Skin: Negative for color change and wound. Neurological: Negative for tremors and syncope. Psychiatric/Behavioral: Negative for confusion and decreased concentration. Vitals:    05/08/20 1116   BP: (!) 154/100   Pulse: 87   Resp: 18   Temp: 98.2 °F (36.8 °C)   SpO2: 99%   Weight: 144.2 kg (318 lb)   Height: 5' 5\" (1.651 m)            Physical Exam  Vitals signs and nursing note reviewed. Constitutional:       Appearance: Normal appearance. HENT:      Head: Normocephalic and atraumatic. Nose: Nose normal.      Mouth/Throat:      Mouth: Mucous membranes are moist.   Eyes:      Extraocular Movements: Extraocular movements intact. Pupils: Pupils are equal, round, and reactive to light. Neck:      Musculoskeletal: Normal range of motion and neck supple.       Comments: No pain with palpation of c spine  Cardiovascular:      Rate and Rhythm: Normal rate and regular rhythm. Heart sounds: Normal heart sounds. Pulmonary:      Effort: Pulmonary effort is normal.      Breath sounds: Normal breath sounds. Musculoskeletal:         General: Tenderness present. No swelling. Left ankle: She exhibits decreased range of motion. She exhibits no swelling, no ecchymosis, no deformity, no laceration and normal pulse. Tenderness. Medial malleolus, head of 5th metatarsal and proximal fibula tenderness found. No lateral malleolus tenderness found. Back:         Legs:    Skin:     General: Skin is warm and dry. Capillary Refill: Capillary refill takes less than 2 seconds. Findings: No bruising or erythema. Neurological:      General: No focal deficit present. Mental Status: She is alert and oriented to person, place, and time. Psychiatric:         Mood and Affect: Mood normal.         Behavior: Behavior normal.         Thought Content: Thought content normal.         Judgment: Judgment normal.          MDM  Number of Diagnoses or Management Options  Diagnosis management comments: Will xray hip knee and ankle  12:41 PM xrays are neg.   Will ace and dc to home       Amount and/or Complexity of Data Reviewed  Tests in the radiology section of CPT®: ordered and reviewed    Risk of Complications, Morbidity, and/or Mortality  Presenting problems: minimal  Diagnostic procedures: minimal  Management options: minimal    Patient Progress  Patient progress: stable         Procedures

## 2020-05-11 ENCOUNTER — PATIENT OUTREACH (OUTPATIENT)
Dept: CASE MANAGEMENT | Age: 55
End: 2020-05-11

## 2020-05-11 NOTE — PROGRESS NOTES
This note will not be viewable in [address] 1st attempt to contact patient regarding COVID-19 risk education with no answer on mobile phone. Message left to return call. CC will attempt outreach again within 1 business day.

## 2020-05-12 ENCOUNTER — PATIENT OUTREACH (OUTPATIENT)
Dept: CASE MANAGEMENT | Age: 55
End: 2020-05-12

## 2020-05-12 NOTE — PROGRESS NOTES
Patient contacted regarding recent discharge and COVID-19 risk   Care Transition Nurse/ Ambulatory Care Manager contacted the patient by telephone to perform post discharge assessment. Verified name and  with patient as identifiers. Patient has following risk factors of: asthma and diabetes. CTN/ACM reviewed discharge instructions, medical action plan and red flags related to discharge diagnosis. Reviewed and educated them on any new and changed medications related to discharge diagnosis. Advised obtaining a 90-day supply of all daily and as-needed medications. Education provided regarding infection prevention, and signs and symptoms of COVID-19 and when to seek medical attention with patient who verbalized understanding. Discussed exposure protocols and quarantine from [address] you at higher risk for severe illness  and given an opportunity for questions and concerns. The patient agrees to contact the COVID-19 hotline [phone] or PCP office for questions related to their healthcare. CTN/ACM provided contact information for future reference. From CDC: Are you at higher risk for severe illness?  Wash your hands often.  Avoid close contact (6 feet, which is about two arm lengths) with people who are sick.  Put distance between yourself and other people if COVID-19 is spreading in your community.  Clean and disinfect frequently touched surfaces.  Avoid all cruise travel and non-essential air travel.  Call your healthcare professional if you have concerns about COVID-19 and your underlying condition or if you are sick.     For more information on steps you can take to protect yourself, see CDC's How to Protect Yourself      Patient/family/caregiver given information for Oleg Rodriguez and agrees to enroll no  Patient's preferred e-mail:    Patient's preferred phone number:   Based on Loop alert triggers, patient will be contacted by nurse care manager for worsening symptoms. Plan for follow-up call in 7-14 days based on severity of symptoms and risk factors.

## 2020-05-18 ENCOUNTER — HOSPITAL ENCOUNTER (OUTPATIENT)
Dept: ULTRASOUND IMAGING | Age: 55
Discharge: HOME | End: 2020-05-18
Attending: NURSE PRACTITIONER

## 2020-05-18 DIAGNOSIS — M79.89 LEFT LEG SWELLING: ICD-10-CM

## 2020-05-18 PROBLEM — K35.80 ACUTE APPENDICITIS: Status: RESOLVED | Noted: 2020-02-22 | Resolved: 2020-05-18

## 2020-05-26 ENCOUNTER — PATIENT OUTREACH (OUTPATIENT)
Dept: CASE MANAGEMENT | Age: 55
End: 2020-05-26

## 2020-05-26 NOTE — PROGRESS NOTES
This note will not be viewable in [address] Patient resolved from Transition of Care episode on 5/26/2020  Discussed COVID-19 related testing which was not done at this time. Test results were not done. Patient informed of results, if available? Patient/family has been provided the following resources and education related to COVID-19:                         Signs, symptoms and red flags related to COVID-19            CDC exposure and quarantine guidelines            [facility] exposure contact - [phone]            Contact for their local Department of Health                 Patient currently reports that the following symptoms have improved:  no new symptoms and no worsening symptoms. No further outreach scheduled with this CTN/ACM/LPN/HC/ MA. Episode of Care resolved. Patient has this CTN/ACM/LPN/HC/MA contact information if future needs arise.

## 2020-09-21 PROBLEM — R13.19 ESOPHAGEAL DYSPHAGIA: Status: RESOLVED | Noted: 2017-06-14 | Resolved: 2020-09-21

## 2022-02-28 ENCOUNTER — NURSE TRIAGE (OUTPATIENT)
Dept: OTHER | Facility: CLINIC | Age: 57
End: 2022-02-28

## 2022-02-28 NOTE — TELEPHONE ENCOUNTER
Received call from Manferd Bloom at [facility] with [employer]. Subjective: Caller states \"Abd pain with bloating\"     Current Symptoms:   - Pain and pressure at top of stomach.  - Nausea, Diarrhea   - Denies bloody stool    Onset:  Weeks     Associated Symptoms:     Pain Severity: 10/10 constant, worse after eating - radiates to back. Temperature: Denies - Chills     What has been tried: omeprazole, diet changes     LMP: NA Pregnant: NA    Recommended disposition: Go to ED Now - Non specific chest pain symptoms unrelieved by diet changes, medications. 10/10 pain. Care advice provided, patient verbalizes understanding; denies any other questions or concerns; instructed to call back for any new or worsening symptoms. Pt agrees with dispo and will be going to ED    Attention Provider: Thank you for allowing me to participate in the care of your patient. The patient was connected to triage in response to information provided to the ECC. Please do not respond through this encounter as the response is not directed to a shared pool.       Reason for Disposition   [1] SEVERE pain (e.g., excruciating) AND [2] present > 1 hour    Protocols used: ABDOMINAL PAIN - UPPER-ADULT-AH

## 2022-03-09 ENCOUNTER — HOSPITAL ENCOUNTER (OUTPATIENT)
Dept: GENERAL RADIOLOGY | Age: 57
Discharge: HOME | End: 2022-03-09

## 2022-03-09 DIAGNOSIS — R10.84 GENERALIZED ABDOMINAL PAIN: ICD-10-CM

## 2022-03-18 PROBLEM — G43.009 MIGRAINE WITHOUT AURA AND WITHOUT STATUS MIGRAINOSUS, NOT INTRACTABLE: Status: ACTIVE | Noted: 2017-10-16

## 2022-03-18 PROBLEM — R53.82 CHRONIC FATIGUE: Status: ACTIVE | Noted: 2017-10-16

## 2022-03-18 PROBLEM — I25.10 ATHEROSCLEROSIS OF CORONARY ARTERY: Status: ACTIVE | Noted: 2017-10-16

## 2022-03-19 PROBLEM — F33.0 DEPRESSION, MAJOR, RECURRENT, MILD (HCC): Status: ACTIVE | Noted: 2019-06-17

## 2022-03-19 PROBLEM — D22.9 MULTIPLE NEVI: Status: ACTIVE | Noted: 2019-06-17

## 2022-03-19 PROBLEM — Z90.49 S/P APPENDECTOMY: Status: ACTIVE | Noted: 2020-03-11

## 2022-03-19 PROBLEM — K21.9 GERD (GASTROESOPHAGEAL REFLUX DISEASE): Status: ACTIVE | Noted: 2017-06-14

## 2022-03-19 PROBLEM — E11.40 TYPE 2 DIABETES MELLITUS WITH DIABETIC NEUROPATHY (HCC): Status: ACTIVE | Noted: 2020-01-23

## 2022-03-19 PROBLEM — R79.89 ELEVATED TSH: Status: ACTIVE | Noted: 2019-06-17

## 2022-03-19 PROBLEM — K59.01 SLOW TRANSIT CONSTIPATION: Status: ACTIVE | Noted: 2017-06-14

## 2022-03-20 PROBLEM — G56.03 CARPAL TUNNEL SYNDROME ON BOTH SIDES: Status: ACTIVE | Noted: 2017-10-16

## 2022-05-27 ENCOUNTER — TELEPHONE (OUTPATIENT)
Dept: INTERNAL MEDICINE CLINIC | Facility: CLINIC | Age: 57
End: 2022-05-27

## 2022-05-27 DIAGNOSIS — I10 ESSENTIAL HYPERTENSION: Primary | ICD-10-CM

## 2022-05-27 DIAGNOSIS — G43.009 MIGRAINE WITHOUT AURA AND WITHOUT STATUS MIGRAINOSUS, NOT INTRACTABLE: ICD-10-CM

## 2022-05-28 RX ORDER — BUTALBITAL, ACETAMINOPHEN AND CAFFEINE 50; 325; 40 MG/1; MG/1; MG/1
1 TABLET ORAL EVERY 6 HOURS PRN
Qty: 30 TABLET | Refills: 0 | Status: SHIPPED | OUTPATIENT
Start: 2022-05-28 | End: 2022-07-25 | Stop reason: SDUPTHER

## 2022-05-28 RX ORDER — LISINOPRIL 20 MG/1
20 TABLET ORAL DAILY
Qty: 90 TABLET | Refills: 0 | Status: SHIPPED | OUTPATIENT
Start: 2022-05-28 | End: 2022-07-19 | Stop reason: SDUPTHER

## 2022-05-28 NOTE — TELEPHONE ENCOUNTER
I spoke with the patient on the phone and she states that her PCP did not call in her blood pressure medication Lisinopril 20 mg or her Fiorecet. I recommended the patient get by the weekend with Excedrin migraine for her headaches and that I would refill her lisinopril 20 mg. I called her lisinopril 20 mg into her pharmacy, and the pharmacist Stated that the patient had already called her three times today to try and get refills on lis inopril and Fioricet and that an emergency three day supply to get her through the weekend was electronically requested to her PCP but that she had not picked it up yet. The pharmacist then cancelled that three day script and I wrote for seven days worth of lisinopril 20 mg. The patient can get the full script from her PCP next week. Carolina Spatz, D.O.   Internal Medicine   [facility]

## 2022-05-28 NOTE — PROGRESS NOTES
I spoke with the patient on the phone and she states that her PCP did not call in her blood pressure medication Lisinopril 20 mg or her Fiorecet. I recommended the patient get by the weekend with Excedrin migraine for her headaches and that I would refill her lisinopril 20 mg. I called her lisinopril 20 mg into her pharmacy, and the pharmacist Stated that the patient had already called her three times today to try and get refills on lis inopril and Fioricet and that an emergency three day supply to get her through the weekend was electronically requested to her PCP but that she had not picked it up yet. The pharmacist then cancelled that three day script and I wrote for seven days worth of lisinopril 20 mg. The patient can get the full script from her PCP next week.     Clarke Sorensen D.O.

## 2022-05-31 ENCOUNTER — TELEPHONE (OUTPATIENT)
Dept: INTERNAL MEDICINE CLINIC | Facility: CLINIC | Age: 57
End: 2022-05-31

## 2022-05-31 DIAGNOSIS — Z00.00 ANNUAL PHYSICAL EXAM: Primary | ICD-10-CM

## 2022-05-31 DIAGNOSIS — E11.40 TYPE 2 DIABETES MELLITUS WITH DIABETIC NEUROPATHY, WITHOUT LONG-TERM CURRENT USE OF INSULIN (HCC): ICD-10-CM

## 2022-05-31 DIAGNOSIS — E55.9 VITAMIN D DEFICIENCY: ICD-10-CM

## 2022-05-31 DIAGNOSIS — I10 ESSENTIAL HYPERTENSION: ICD-10-CM

## 2022-05-31 NOTE — TELEPHONE ENCOUNTER
Requesting a refill for Lisnopril. Pt called on 5/27/22 and spoke with a doctor on-call which gave pt a refill for 7 days and pt will be out as of 6/3/22. Pt needs to be seen in order to receive a new prescription. Would like a office visit as soon as possible. Can pt receive a callback asap?

## 2022-05-31 NOTE — TELEPHONE ENCOUNTER
CPE set for 7/7/22 and I ordered her CPE labs Pt is aware to go to [address] [place] [address]

## 2022-07-16 ENCOUNTER — APPOINTMENT (OUTPATIENT)
Dept: ULTRASOUND IMAGING | Age: 57
End: 2022-07-16
Payer: COMMERCIAL

## 2022-07-16 ENCOUNTER — HOSPITAL ENCOUNTER (EMERGENCY)
Age: 57
Discharge: HOME | End: 2022-07-16
Attending: EMERGENCY MEDICINE
Payer: COMMERCIAL

## 2022-07-16 VITALS
BODY MASS INDEX: 48.82 KG/M2 | HEART RATE: 89 BPM | WEIGHT: 293 LBS | DIASTOLIC BLOOD PRESSURE: 97 MMHG | OXYGEN SATURATION: 98 % | HEIGHT: 65 IN | TEMPERATURE: 99.3 F | SYSTOLIC BLOOD PRESSURE: 163 MMHG | RESPIRATION RATE: 17 BRPM

## 2022-07-16 DIAGNOSIS — R11.2 NAUSEA, VOMITING AND DIARRHEA: Primary | ICD-10-CM

## 2022-07-16 DIAGNOSIS — R74.8 ELEVATED LIVER ENZYMES: ICD-10-CM

## 2022-07-16 DIAGNOSIS — R19.7 NAUSEA, VOMITING AND DIARRHEA: Primary | ICD-10-CM

## 2022-07-16 LAB
ALBUMIN SERPL-MCNC: 3.8 G/DL (ref 3.5–5)
ALBUMIN/GLOB SERPL: 1.1 {RATIO} (ref 1.2–3.5)
ALP SERPL-CCNC: 141 U/L (ref 50–136)
ALT SERPL-CCNC: 138 U/L (ref 12–65)
ANION GAP SERPL CALC-SCNC: 5 MMOL/L (ref 7–16)
APPEARANCE UR: ABNORMAL
ARTERIAL PATENCY WRIST A: ABNORMAL
AST SERPL-CCNC: 208 U/L (ref 15–37)
BACTERIA URNS QL MICRO: ABNORMAL /HPF
BASE DEFICIT BLDV-SCNC: 1.7 MMOL/L
BASOPHILS # BLD: 0 K/UL (ref 0–0.2)
BASOPHILS NFR BLD: 0 % (ref 0–2)
BILIRUB SERPL-MCNC: 0.8 MG/DL (ref 0.2–1.1)
BILIRUB UR QL: NEGATIVE
BUN SERPL-MCNC: 13 MG/DL (ref 6–23)
CALCIUM SERPL-MCNC: 9.1 MG/DL (ref 8.3–10.4)
CHLORIDE SERPL-SCNC: 101 MMOL/L (ref 98–107)
CO2 SERPL-SCNC: 27 MMOL/L (ref 21–32)
COLOR UR: ABNORMAL
CREAT SERPL-MCNC: 1 MG/DL (ref 0.6–1)
DIFFERENTIAL METHOD BLD: ABNORMAL
EKG ATRIAL RATE: 95 BPM
EKG DIAGNOSIS: NORMAL
EKG P AXIS: 72 DEGREES
EKG P-R INTERVAL: 176 MS
EKG Q-T INTERVAL: 368 MS
EKG QRS DURATION: 88 MS
EKG QTC CALCULATION (BAZETT): 462 MS
EKG R AXIS: 52 DEGREES
EKG T AXIS: 69 DEGREES
EKG VENTRICULAR RATE: 95 BPM
EOSINOPHIL # BLD: 0 K/UL (ref 0–0.8)
EOSINOPHIL NFR BLD: 0 % (ref 0.5–7.8)
EPI CELLS #/AREA URNS HPF: ABNORMAL /HPF
ERYTHROCYTE [DISTWIDTH] IN BLOOD BY AUTOMATED COUNT: 13.1 % (ref 11.9–14.6)
GAS FLOW.O2 O2 DELIVERY SYS: ABNORMAL L/MIN
GLOBULIN SER CALC-MCNC: 3.6 G/DL (ref 2.3–3.5)
GLUCOSE SERPL-MCNC: 231 MG/DL (ref 65–100)
GLUCOSE UR STRIP.AUTO-MCNC: 250 MG/DL
HCO3 BLDV-SCNC: 22.1 MMOL/L (ref 23–28)
HCT VFR BLD AUTO: 44.4 % (ref 35.8–46.3)
HGB BLD-MCNC: 14.9 G/DL (ref 11.7–15.4)
HGB UR QL STRIP: ABNORMAL
IMM GRANULOCYTES # BLD AUTO: 0.1 K/UL (ref 0–0.5)
IMM GRANULOCYTES NFR BLD AUTO: 1 % (ref 0–5)
KETONES UR QL STRIP.AUTO: ABNORMAL MG/DL
LEUKOCYTE ESTERASE UR QL STRIP.AUTO: NEGATIVE
LYMPHOCYTES # BLD: 0.6 K/UL (ref 0.5–4.6)
LYMPHOCYTES NFR BLD: 9 % (ref 13–44)
MCH RBC QN AUTO: 29.7 PG (ref 26.1–32.9)
MCHC RBC AUTO-ENTMCNC: 33.6 G/DL (ref 31.4–35)
MCV RBC AUTO: 88.4 FL (ref 79.6–97.8)
MONOCYTES # BLD: 0.5 K/UL (ref 0.1–1.3)
MONOCYTES NFR BLD: 7 % (ref 4–12)
NEUTS SEG # BLD: 6 K/UL (ref 1.7–8.2)
NEUTS SEG NFR BLD: 83 % (ref 43–78)
NITRITE UR QL STRIP.AUTO: NEGATIVE
NRBC # BLD: 0 K/UL (ref 0–0.2)
OTHER OBSERVATIONS: ABNORMAL
PCO2 BLDV: 34.2 MMHG (ref 41–51)
PH BLDV: 7.42 [PH] (ref 7.32–7.42)
PH UR STRIP: 5 [PH] (ref 5–9)
PLATELET # BLD AUTO: 333 K/UL (ref 150–450)
PMV BLD AUTO: 10.3 FL (ref 9.4–12.3)
PO2 BLDV: 44 MMHG
POC FIO2: 0
POTASSIUM SERPL-SCNC: 3.9 MMOL/L (ref 3.5–5.1)
PROT SERPL-MCNC: 7.4 G/DL (ref 6.3–8.2)
PROT UR STRIP-MCNC: 100 MG/DL
RBC # BLD AUTO: 5.02 M/UL (ref 4.05–5.2)
RBC #/AREA URNS HPF: ABNORMAL /HPF
SAO2 % BLDV: 81.3 % (ref 65–88)
SARS-COV-2 RDRP RESP QL NAA+PROBE: NOT DETECTED
SARS-COV-2 RDRP RESP QL NAA+PROBE: NOT DETECTED
SERVICE CMNT-IMP: ABNORMAL
SERVICE CMNT-IMP: ABNORMAL
SODIUM SERPL-SCNC: 133 MMOL/L (ref 136–145)
SOURCE: NORMAL
SOURCE: NORMAL
SP GR UR REFRACTOMETRY: 1.02 (ref 1–1.02)
SPECIMEN TYPE: ABNORMAL
TROPONIN I SERPL HS-MCNC: 12.4 PG/ML (ref 0–14)
UROBILINOGEN UR QL STRIP.AUTO: 0.2 EU/DL (ref 0.2–1)
WBC # BLD AUTO: 7.2 K/UL (ref 4.3–11.1)
WBC URNS QL MICRO: ABNORMAL /HPF

## 2022-07-16 PROCEDURE — 96361 HYDRATE IV INFUSION ADD-ON: CPT

## 2022-07-16 PROCEDURE — 96375 TX/PRO/DX INJ NEW DRUG ADDON: CPT

## 2022-07-16 PROCEDURE — 2580000003 HC RX 258: Performed by: EMERGENCY MEDICINE

## 2022-07-16 PROCEDURE — 76705 ECHO EXAM OF ABDOMEN: CPT

## 2022-07-16 PROCEDURE — 85025 COMPLETE CBC W/AUTO DIFF WBC: CPT

## 2022-07-16 PROCEDURE — 82803 BLOOD GASES ANY COMBINATION: CPT

## 2022-07-16 PROCEDURE — 81001 URINALYSIS AUTO W/SCOPE: CPT

## 2022-07-16 PROCEDURE — 99284 EMERGENCY DEPT VISIT MOD MDM: CPT

## 2022-07-16 PROCEDURE — 80053 COMPREHEN METABOLIC PANEL: CPT

## 2022-07-16 PROCEDURE — 87635 SARS-COV-2 COVID-19 AMP PRB: CPT

## 2022-07-16 PROCEDURE — 96374 THER/PROPH/DIAG INJ IV PUSH: CPT

## 2022-07-16 PROCEDURE — 6360000002 HC RX W HCPCS: Performed by: EMERGENCY MEDICINE

## 2022-07-16 PROCEDURE — 82962 GLUCOSE BLOOD TEST: CPT

## 2022-07-16 PROCEDURE — 84484 ASSAY OF TROPONIN QUANT: CPT

## 2022-07-16 PROCEDURE — 93005 ELECTROCARDIOGRAM TRACING: CPT | Performed by: EMERGENCY MEDICINE

## 2022-07-16 RX ORDER — 0.9 % SODIUM CHLORIDE 0.9 %
1000 INTRAVENOUS SOLUTION INTRAVENOUS ONCE
Status: COMPLETED | OUTPATIENT
Start: 2022-07-16 | End: 2022-07-16

## 2022-07-16 RX ORDER — ONDANSETRON 4 MG/1
8 TABLET, FILM COATED ORAL EVERY 8 HOURS PRN
Qty: 15 TABLET | Refills: 0 | Status: SHIPPED | OUTPATIENT
Start: 2022-07-16 | End: 2022-07-19 | Stop reason: SDUPTHER

## 2022-07-16 RX ORDER — DROPERIDOL 2.5 MG/ML
5 INJECTION, SOLUTION INTRAMUSCULAR; INTRAVENOUS EVERY 6 HOURS PRN
Status: DISCONTINUED | OUTPATIENT
Start: 2022-07-16 | End: 2022-07-16 | Stop reason: HOSPADM

## 2022-07-16 RX ORDER — DIPHENHYDRAMINE HYDROCHLORIDE 50 MG/ML
50 INJECTION INTRAMUSCULAR; INTRAVENOUS ONCE
Status: COMPLETED | OUTPATIENT
Start: 2022-07-16 | End: 2022-07-16

## 2022-07-16 RX ORDER — SODIUM CHLORIDE, SODIUM LACTATE, POTASSIUM CHLORIDE, AND CALCIUM CHLORIDE .6; .31; .03; .02 G/100ML; G/100ML; G/100ML; G/100ML
1000 INJECTION, SOLUTION INTRAVENOUS
Status: COMPLETED | OUTPATIENT
Start: 2022-07-16 | End: 2022-07-16

## 2022-07-16 RX ORDER — HYOSCYAMINE SULFATE 0.12 MG/1
0.12 TABLET SUBLINGUAL EVERY 6 HOURS PRN
Qty: 15 EACH | Refills: 0 | Status: SHIPPED | OUTPATIENT
Start: 2022-07-16

## 2022-07-16 RX ADMIN — DIPHENHYDRAMINE HYDROCHLORIDE 50 MG: 50 INJECTION, SOLUTION INTRAMUSCULAR; INTRAVENOUS at 12:53

## 2022-07-16 RX ADMIN — DROPERIDOL 5 MG: 2.5 INJECTION, SOLUTION INTRAMUSCULAR; INTRAVENOUS at 12:53

## 2022-07-16 RX ADMIN — SODIUM CHLORIDE 1000 ML: 9 INJECTION, SOLUTION INTRAVENOUS at 10:39

## 2022-07-16 RX ADMIN — SODIUM CHLORIDE, POTASSIUM CHLORIDE, SODIUM LACTATE AND CALCIUM CHLORIDE 1000 ML: 600; 310; 30; 20 INJECTION, SOLUTION INTRAVENOUS at 12:53

## 2022-07-16 ASSESSMENT — ENCOUNTER SYMPTOMS
DIARRHEA: 1
VOICE CHANGE: 0
TROUBLE SWALLOWING: 0
BACK PAIN: 0
ABDOMINAL PAIN: 1
EYE PAIN: 0
SHORTNESS OF BREATH: 0
VOMITING: 1
FACIAL SWELLING: 0
CHEST TIGHTNESS: 0
COUGH: 0
NAUSEA: 1
SORE THROAT: 0

## 2022-07-16 ASSESSMENT — PAIN SCALES - GENERAL: PAINLEVEL_OUTOF10: 10

## 2022-07-16 ASSESSMENT — PAIN - FUNCTIONAL ASSESSMENT: PAIN_FUNCTIONAL_ASSESSMENT: 0-10

## 2022-07-16 NOTE — ED NOTES
I have reviewed discharge instructions with the patient. The patient verbalized understanding. Patient left ED via Discharge Method: ambulatory to Home with spouse    Opportunity for questions and clarification provided. Patient given 2 scripts. To continue your aftercare when you leave the hospital, you may receive an automated call from our care team to check in on how you are doing. This is a free service and part of our promise to provide the best care and service to meet your aftercare needs.  If you have questions, or wish to unsubscribe from this service please call [phone]. Thank you for Choosing our [facility] Emergency Department.         Severo Golds, RN  07/16/22 0980

## 2022-07-16 NOTE — DISCHARGE INSTRUCTIONS
Diagnosed with nausea vomiting diarrhea here from an unknown source. We also incidentally found her liver enzymes to be slightly elevated. Your gallbladder ultrasound here no evidence of infection. Turn for any worsening symptoms or any other concerning issues. Please take the medication as prescribed.

## 2022-07-16 NOTE — ED PROVIDER NOTES
[facility] Provider Note                   PCP:                CESILIA Harman CNP               Age: 62 y.o. Sex: female       ICD-10-CM    1. Nausea, vomiting and diarrhea  R11.2     R19.7       2. Elevated liver enzymes  R74.8           DISPOSITION Decision To Discharge 07/16/2022 03:00:04 PM        MDM  Number of Diagnoses or Management Options  Elevated liver enzymes  Nausea, vomiting and diarrhea  Diagnosis management comments: 59-year-old female presents for nausea vomiting diarrhea. Vital signs are reviewed. Patient was given symptomatic treatment for her nausea vomiting diarrhea with fluids as well as nausea medication. Lab work here has come back positive for elevated liver enzymes no elevation of bilirubin. Formal ultrasound was obtained which showed no evidence of Daphne lithiasis cholecystitis. Patient continues to be symptomatic free. At this time she will be given Levsin and Zofran for home. She was given follow-up with her primary care doctor for follow-up on her elevated liver enzymes. Patient is stable on discharge examination         Orders Placed This Encounter   Procedures    COVID-19, Rapid    COVID-19, Rapid    US ABDOMEN LIMITED Specify organ? GALLBLADDER, LIVER    CBC with Auto Differential    Comprehensive Metabolic Panel    Troponin    Urinalysis w rflx microscopic    Blood Gas, Venous    POCT Urine Dipstick    Venous Blood Gas, POC    EKG 12 Lead    Saline lock IV        Orlene Fransisca is a 62 y.o. female who presents to the Emergency Department with chief complaint of    Chief Complaint   Patient presents with    Nausea & Vomiting      59-year-old female presents emerged department via private vehicle with chief complaint of whole body myalgias, nausea vomiting and diarrhea. Reports that symptoms started yesterday. She denies any alleviating or grading factors. Symptoms been constant in time.   She reports no chest pain or shortness of breath. She reports some crampy abdominal pain she denies any urinary symptoms. She denies any known sick contacts. She does report having history of hypertension and diabetes. Review of Systems   Constitutional:  Negative for activity change, chills and fever. HENT:  Negative for dental problem, drooling, facial swelling, sore throat, trouble swallowing and voice change. Eyes:  Negative for pain. Respiratory:  Negative for cough, chest tightness and shortness of breath. Cardiovascular:  Negative for chest pain and palpitations. Gastrointestinal:  Positive for abdominal pain, diarrhea, nausea and vomiting. Endocrine: Negative for polydipsia. Genitourinary:  Negative for difficulty urinating, dysuria and hematuria. Musculoskeletal:  Negative for back pain and neck pain. Skin:  Negative for rash and wound. Neurological:  Negative for dizziness, seizures, facial asymmetry, speech difficulty, numbness and headaches. Psychiatric/Behavioral:  Negative for agitation and behavioral problems. Past Medical History:   Diagnosis Date    Anxiety     Asthma     Depression     GERD (gastroesophageal reflux disease)     Headache     Hx of seasonal allergies     Hypertension         Past Surgical History:   Procedure Laterality Date    APPENDECTOMY  02/22/2020    HYSTERECTOMY  2014    total     OOPHORECTOMY          Family History   Problem Relation Age of Onset    Hypertension Mother     Hypertension Father     Other Father         epilepsy    Heart Disease Father     Diabetes Sister         Social Connections: Not on file        Allergies   Allergen Reactions    Aspirin Other (See Comments)     Stomach upset    Escitalopram Oxalate Nausea And Vomiting    Hydrocodone-Acetaminophen Nausea And Vomiting     dizziness    Morphine Anxiety        Vitals signs and nursing note reviewed.    Patient Vitals for the past 4 hrs:   Pulse Resp BP SpO2   07/16/22 1130 89 17 (!) 163/97 98 %          Physical Exam  Vitals and nursing note reviewed. Constitutional:       General: She is not in acute distress. Appearance: Normal appearance. She is obese. She is not ill-appearing. HENT:      Head: Normocephalic and atraumatic. Right Ear: Tympanic membrane normal.      Left Ear: Tympanic membrane normal.      Mouth/Throat:      Mouth: Mucous membranes are dry. Pharynx: Oropharynx is clear. Eyes:      Extraocular Movements: Extraocular movements intact. Pupils: Pupils are equal, round, and reactive to light. Cardiovascular:      Rate and Rhythm: Normal rate and regular rhythm. Heart sounds: No murmur heard. Pulmonary:      Effort: No respiratory distress. Breath sounds: No wheezing or rhonchi. Abdominal:      Palpations: Abdomen is soft. There is no mass. Tenderness: There is no abdominal tenderness. There is no guarding. Musculoskeletal:         General: No swelling or tenderness. Cervical back: Normal range of motion and neck supple. No rigidity or tenderness. Skin:     General: Skin is warm and dry. Capillary Refill: Capillary refill takes less than 2 seconds. Neurological:      General: No focal deficit present. Mental Status: She is alert and oriented to person, place, and time. Mental status is at baseline.    Psychiatric:         Mood and Affect: Mood normal.         Behavior: Behavior normal.        Procedures      Labs Reviewed   CBC WITH AUTO DIFFERENTIAL - Abnormal; Notable for the following components:       Result Value    Seg Neutrophils 83 (*)     Lymphocytes 9 (*)     Eosinophils % 0 (*)     All other components within normal limits   COMPREHENSIVE METABOLIC PANEL - Abnormal; Notable for the following components:    Sodium 133 (*)     Anion Gap 5 (*)     Glucose 231 (*)      (*)      (*)     Alk Phosphatase 141 (*)     Globulin 3.6 (*)     Albumin/Globulin Ratio 1.1 (*)     All other components within normal limits   URINALYSIS - Abnormal; Notable for the following components:    Protein,  (*)     Ketones, Urine TRACE (*)     Blood, Urine SMALL (*)     BACTERIA, URINE 2+ (*)     All other components within normal limits   VENOUS BLOOD GAS, POINT OF CARE - Abnormal; Notable for the following components:    PCO2, [place], POC 34.2 (*)     HCO3, Venous 22.1 (*)     All other components within normal limits   COVID-19, RAPID   COVID-19, RAPID   TROPONIN   BLOOD GAS, VENOUS        US ABDOMEN LIMITED Specify organ? GALLBLADDER, LIVER   Final Result      1. No shadowing gallstones or biliary ductal dilatation. 2. Hepatic steatosis. Voice dictation software was used during the making of this note. This software is not perfect and grammatical and other typographical errors may be present. This note has not been completely proofread for errors.      Ten Perez,   07/16/22 1502

## 2022-07-16 NOTE — ED TRIAGE NOTES
Patient ambulatory to triage with mask in place. Patient reports hyperglycemia, n/v/d and hypertension since yesterday.

## 2022-07-18 LAB
GLUCOSE BLD STRIP.AUTO-MCNC: 245 MG/DL (ref 65–100)
SERVICE CMNT-IMP: ABNORMAL

## 2022-07-19 ENCOUNTER — OFFICE VISIT (OUTPATIENT)
Dept: INTERNAL MEDICINE CLINIC | Facility: CLINIC | Age: 57
End: 2022-07-19
Payer: COMMERCIAL

## 2022-07-19 VITALS
WEIGHT: 293 LBS | BODY MASS INDEX: 48.82 KG/M2 | HEIGHT: 65 IN | DIASTOLIC BLOOD PRESSURE: 88 MMHG | SYSTOLIC BLOOD PRESSURE: 138 MMHG

## 2022-07-19 DIAGNOSIS — R11.2 NAUSEA AND VOMITING, INTRACTABILITY OF VOMITING NOT SPECIFIED, UNSPECIFIED VOMITING TYPE: ICD-10-CM

## 2022-07-19 DIAGNOSIS — K21.9 GASTROESOPHAGEAL REFLUX DISEASE WITHOUT ESOPHAGITIS: ICD-10-CM

## 2022-07-19 DIAGNOSIS — E78.5 HYPERLIPIDEMIA, UNSPECIFIED HYPERLIPIDEMIA TYPE: ICD-10-CM

## 2022-07-19 DIAGNOSIS — I10 ESSENTIAL HYPERTENSION: ICD-10-CM

## 2022-07-19 DIAGNOSIS — F33.0 MAJOR DEPRESSIVE DISORDER, RECURRENT, MILD (HCC): ICD-10-CM

## 2022-07-19 DIAGNOSIS — R79.89 ELEVATED LFTS: ICD-10-CM

## 2022-07-19 DIAGNOSIS — R79.89 ELEVATED LFTS: Primary | ICD-10-CM

## 2022-07-19 DIAGNOSIS — E11.40 TYPE 2 DIABETES MELLITUS WITH DIABETIC NEUROPATHY, UNSPECIFIED WHETHER LONG TERM INSULIN USE (HCC): ICD-10-CM

## 2022-07-19 DIAGNOSIS — Z00.00 ROUTINE HEALTH MAINTENANCE: ICD-10-CM

## 2022-07-19 DIAGNOSIS — E11.9 TYPE 2 DIABETES MELLITUS WITHOUT COMPLICATION, WITHOUT LONG-TERM CURRENT USE OF INSULIN (HCC): ICD-10-CM

## 2022-07-19 PROBLEM — I25.10 CORONARY ATHEROSCLEROSIS: Status: ACTIVE | Noted: 2022-07-19

## 2022-07-19 PROBLEM — K59.01 SLOW TRANSIT CONSTIPATION: Status: ACTIVE | Noted: 2017-06-14

## 2022-07-19 PROBLEM — R13.19 ESOPHAGEAL DYSPHAGIA: Status: ACTIVE | Noted: 2017-06-14

## 2022-07-19 LAB
ALBUMIN SERPL-MCNC: 3.7 G/DL (ref 3.5–5)
ALBUMIN/GLOB SERPL: 1.2 {RATIO} (ref 1.2–3.5)
ALP SERPL-CCNC: 137 U/L (ref 50–136)
ALT SERPL-CCNC: 103 U/L (ref 12–65)
ANION GAP SERPL CALC-SCNC: 3 MMOL/L (ref 7–16)
AST SERPL-CCNC: 59 U/L (ref 15–37)
BASOPHILS # BLD: 0.1 K/UL (ref 0–0.2)
BASOPHILS NFR BLD: 1 % (ref 0–2)
BILIRUB SERPL-MCNC: 0.5 MG/DL (ref 0.2–1.1)
BUN SERPL-MCNC: 16 MG/DL (ref 6–23)
CALCIUM SERPL-MCNC: 9.3 MG/DL (ref 8.3–10.4)
CHLORIDE SERPL-SCNC: 107 MMOL/L (ref 98–107)
CO2 SERPL-SCNC: 28 MMOL/L (ref 21–32)
CREAT SERPL-MCNC: 1 MG/DL (ref 0.6–1)
DIFFERENTIAL METHOD BLD: NORMAL
EOSINOPHIL # BLD: 0.1 K/UL (ref 0–0.8)
EOSINOPHIL NFR BLD: 1 % (ref 0.5–7.8)
ERYTHROCYTE [DISTWIDTH] IN BLOOD BY AUTOMATED COUNT: 13.2 % (ref 11.9–14.6)
EST. AVERAGE GLUCOSE BLD GHB EST-MCNC: 203 MG/DL
GLOBULIN SER CALC-MCNC: 3.1 G/DL (ref 2.3–3.5)
GLUCOSE SERPL-MCNC: 168 MG/DL (ref 65–100)
HAV IGM SER QL: NONREACTIVE
HBA1C MFR BLD: 8.7 % (ref 4.8–5.6)
HBV CORE IGM SER QL: NONREACTIVE
HBV SURFACE AG SER QL: NONREACTIVE
HCT VFR BLD AUTO: 43.7 % (ref 35.8–46.3)
HCV AB SER QL: NONREACTIVE
HGB BLD-MCNC: 14.3 G/DL (ref 11.7–15.4)
IMM GRANULOCYTES # BLD AUTO: 0 K/UL (ref 0–0.5)
IMM GRANULOCYTES NFR BLD AUTO: 1 % (ref 0–5)
LIPASE SERPL-CCNC: 79 U/L (ref 73–393)
LYMPHOCYTES # BLD: 2.2 K/UL (ref 0.5–4.6)
LYMPHOCYTES NFR BLD: 31 % (ref 13–44)
MCH RBC QN AUTO: 29.5 PG (ref 26.1–32.9)
MCHC RBC AUTO-ENTMCNC: 32.7 G/DL (ref 31.4–35)
MCV RBC AUTO: 90.1 FL (ref 79.6–97.8)
MONOCYTES # BLD: 0.5 K/UL (ref 0.1–1.3)
MONOCYTES NFR BLD: 7 % (ref 4–12)
NEUTS SEG # BLD: 4.2 K/UL (ref 1.7–8.2)
NEUTS SEG NFR BLD: 59 % (ref 43–78)
NRBC # BLD: 0 K/UL (ref 0–0.2)
PLATELET # BLD AUTO: 378 K/UL (ref 150–450)
PMV BLD AUTO: 10.3 FL (ref 9.4–12.3)
POTASSIUM SERPL-SCNC: 4.2 MMOL/L (ref 3.5–5.1)
PROT SERPL-MCNC: 6.8 G/DL (ref 6.3–8.2)
RBC # BLD AUTO: 4.85 M/UL (ref 4.05–5.2)
SODIUM SERPL-SCNC: 138 MMOL/L (ref 136–145)
TSH, 3RD GENERATION: 2 UIU/ML (ref 0.36–3.74)
WBC # BLD AUTO: 7.1 K/UL (ref 4.3–11.1)

## 2022-07-19 PROCEDURE — G8427 DOCREV CUR MEDS BY ELIG CLIN: HCPCS | Performed by: NURSE PRACTITIONER

## 2022-07-19 PROCEDURE — 1036F TOBACCO NON-USER: CPT | Performed by: NURSE PRACTITIONER

## 2022-07-19 PROCEDURE — 2022F DILAT RTA XM EVC RTNOPTHY: CPT | Performed by: NURSE PRACTITIONER

## 2022-07-19 PROCEDURE — 3017F COLORECTAL CA SCREEN DOC REV: CPT | Performed by: NURSE PRACTITIONER

## 2022-07-19 PROCEDURE — 99215 OFFICE O/P EST HI 40 MIN: CPT | Performed by: NURSE PRACTITIONER

## 2022-07-19 PROCEDURE — G8419 CALC BMI OUT NRM PARAM NOF/U: HCPCS | Performed by: NURSE PRACTITIONER

## 2022-07-19 PROCEDURE — 3052F HG A1C>EQUAL 8.0%<EQUAL 9.0%: CPT | Performed by: NURSE PRACTITIONER

## 2022-07-19 RX ORDER — LISINOPRIL 20 MG/1
20 TABLET ORAL DAILY
Qty: 30 TABLET | Refills: 5 | Status: SHIPPED | OUTPATIENT
Start: 2022-07-19

## 2022-07-19 RX ORDER — OMEPRAZOLE 20 MG/1
20 CAPSULE, DELAYED RELEASE ORAL DAILY
Qty: 30 CAPSULE | Refills: 5 | Status: SHIPPED | OUTPATIENT
Start: 2022-07-19

## 2022-07-19 RX ORDER — ONDANSETRON 4 MG/1
8 TABLET, FILM COATED ORAL EVERY 8 HOURS PRN
Qty: 30 TABLET | Refills: 3 | Status: SHIPPED | OUTPATIENT
Start: 2022-07-19

## 2022-07-19 ASSESSMENT — PATIENT HEALTH QUESTIONNAIRE - PHQ9
1. LITTLE INTEREST OR PLEASURE IN DOING THINGS: 1
8. MOVING OR SPEAKING SO SLOWLY THAT OTHER PEOPLE COULD HAVE NOTICED. OR THE OPPOSITE, BEING SO FIGETY OR RESTLESS THAT YOU HAVE BEEN MOVING AROUND A LOT MORE THAN USUAL: 0
SUM OF ALL RESPONSES TO PHQ QUESTIONS 1-9: 7
SUM OF ALL RESPONSES TO PHQ9 QUESTIONS 1 & 2: 2
6. FEELING BAD ABOUT YOURSELF - OR THAT YOU ARE A FAILURE OR HAVE LET YOURSELF OR YOUR FAMILY DOWN: 1
3. TROUBLE FALLING OR STAYING ASLEEP: 1
4. FEELING TIRED OR HAVING LITTLE ENERGY: 1
5. POOR APPETITE OR OVEREATING: 1
10. IF YOU CHECKED OFF ANY PROBLEMS, HOW DIFFICULT HAVE THESE PROBLEMS MADE IT FOR YOU TO DO YOUR WORK, TAKE CARE OF THINGS AT HOME, OR GET ALONG WITH OTHER PEOPLE: 1
7. TROUBLE CONCENTRATING ON THINGS, SUCH AS READING THE NEWSPAPER OR WATCHING TELEVISION: 1
SUM OF ALL RESPONSES TO PHQ QUESTIONS 1-9: 7
2. FEELING DOWN, DEPRESSED OR HOPELESS: 1
9. THOUGHTS THAT YOU WOULD BE BETTER OFF DEAD, OR OF HURTING YOURSELF: 0
SUM OF ALL RESPONSES TO PHQ QUESTIONS 1-9: 7
SUM OF ALL RESPONSES TO PHQ QUESTIONS 1-9: 7

## 2022-07-19 ASSESSMENT — ENCOUNTER SYMPTOMS
VOMITING: 0
ABDOMINAL PAIN: 1
BLOOD IN STOOL: 0
NAUSEA: 0

## 2022-07-19 NOTE — PROGRESS NOTES
PROGRESS NOTE      Chief Complaint   Patient presents with    Follow-Up from Hospital     Pt here for hospital f/u due to nausea and vomiting (B/S and B/P were high)     Hypertension    Cholesterol Problem       HPI    ER follow up: 7/16 presented to ER with 24 hours of nausea, vomiting, diarrhea. Fever, chills, body aches. Negative Covid. Elevated LFTs. Abdominal ultrasounds: no stones or cholecystitis. No contacts with similar symptoms. Did not eat or drink anything prior. Fluids and antiemetics and symptoms resolved. Today feeling much better. Diabetes: Most recent A1C 6.6% 9/21. Metformin 500 mg daily (written bid). Not monitoring glucose at home until recently and home readings 200s. Confused by diet info she is finding online and asking for advice. ASSESSMENT and PLAN    Audrey was seen today for follow-up from hospital, hypertension and cholesterol problem. Diagnoses and all orders for this visit:    Elevated LFTs  -     Comprehensive Metabolic Panel; Future  -     Hepatitis Panel, Acute; Future  Nausea and vomiting, intractability of vomiting not specified, unspecified vomiting type  -     CBC with Auto Differential; Future  -     Lipase; Future  Reviewed ER records, labs, imaging. Symptoms improved. Labs today    Type 2 diabetes mellitus with diabetic neuropathy, unspecified whether long term insulin use (HCC)  -     metFORMIN (GLUCOPHAGE) 500 MG tablet; Take 1 tablet by mouth in the morning and 1 tablet in the evening. Take with meals. -     Hemoglobin A1C; Future  -     [facility]  Increase metformin to bid. Check A1C today. Will refer for diabetic teaching. Type 2 diabetes mellitus without complication, without long-term current use of insulin (HCC)    Essential hypertension  -     lisinopril (PRINIVIL;ZESTRIL) 20 MG tablet; Take 1 tablet by mouth in the morning.     Gastroesophageal reflux disease without esophagitis  -     omeprazole (PRILOSEC) 20 MG delayed release capsule; Take 1 capsule by mouth in the morning. Hyperlipidemia, unspecified hyperlipidemia type  -     TSH; Future    Body mass index (BMI) 50.0-59.9, adult (HCC)    Major depressive disorder, recurrent, mild (HCC)  On the basis of positive PHQ-9 screening ( ), the following plan was implemented:  continue current plan . Patient will follow-up in 1 month(s) with PCP. Routine health maintenance  -     ondansetron (ZOFRAN) 4 MG tablet; Take 2 tablets by mouth every 8 hours as needed for Nausea or Vomiting      Medical problems and test results were reviewed with the patient today. Past Medical History, Past Surgical History, Family history, Social History, and Medications were all reviewed and updated as necessary. Current Outpatient Medications   Medication Sig Dispense Refill    ondansetron (ZOFRAN) 4 MG tablet Take 2 tablets by mouth every 8 hours as needed for Nausea or Vomiting 30 tablet 3    lisinopril (PRINIVIL;ZESTRIL) 20 MG tablet Take 1 tablet by mouth in the morning. 30 tablet 5    metFORMIN (GLUCOPHAGE) 500 MG tablet Take 1 tablet by mouth in the morning and 1 tablet in the evening. Take with meals. 60 tablet 5    omeprazole (PRILOSEC) 20 MG delayed release capsule Take 1 capsule by mouth in the morning.  30 capsule 5    Hyoscyamine Sulfate SL (LEVSIN/SL) 0.125 MG SUBL Place 0.125 mg under the tongue every 6 hours as needed (abdominal pain) 15 each 0    butalbital-acetaminophen-caffeine (FIORICET, ESGIC) -40 MG per tablet Take 1 tablet by mouth every 6 hours as needed for Headaches TAKE 1 TABLET BY MOUTH EVERY 6 HOURS AS NEEDED FOR HEADACHE 30 tablet 0    Lancets MISC Test bs daily pt uses accu check elena dx e11.9      albuterol sulfate  (90 Base) MCG/ACT inhaler Inhale 1 puff into the lungs every 6 hours as needed      celecoxib (CELEBREX) 100 MG capsule Take 100 mg by mouth 2 times daily      cycloSPORINE (RESTASIS) 0.05 % ophthalmic emulsion Apply 1 drop to eye every 12 hours      escitalopram (LEXAPRO) 10 MG tablet Take 10 mg by mouth daily      gabapentin (NEURONTIN) 300 MG capsule Take 300 mg by mouth at bedtime. hydroCHLOROthiazide (HYDRODIURIL) 25 MG tablet Take 25 mg by mouth daily      rosuvastatin (CRESTOR) 10 MG tablet Take 10 mg by mouth in the morning. No current facility-administered medications for this visit. Allergies   Allergen Reactions    Aspirin Other (See Comments)     Stomach upset    Escitalopram Oxalate Nausea And Vomiting    Hydrocodone-Acetaminophen Nausea And Vomiting     dizziness    Morphine Anxiety       REVIEW OF SYSTEMS    Review of Systems   Constitutional:  Positive for fatigue and unexpected weight change. Negative for chills and fever. Cardiovascular:  Negative for chest pain and palpitations. Gastrointestinal:  Positive for abdominal pain. Negative for blood in stool, nausea (resolved) and vomiting. PHYSICAL EXAM    /88   Ht 5' 5\" (1.651 m)   Wt (!) 326 lb (147.9 kg)   BMI 54.25 kg/m²      Physical Exam  Vitals and nursing note reviewed. Constitutional:       Appearance: Normal appearance. She is not ill-appearing. Cardiovascular:      Rate and Rhythm: Normal rate and regular rhythm. Pulmonary:      Effort: Pulmonary effort is normal.      Breath sounds: Normal breath sounds. Abdominal:      Palpations: Abdomen is soft. Tenderness: There is abdominal tenderness in the epigastric area. There is no guarding or rebound. Musculoskeletal:      Right lower leg: No edema. Left lower leg: No edema. Neurological:      Mental Status: She is alert. Psychiatric:         Mood and Affect: Mood normal.         Behavior: Behavior normal.        FOLLOW UP    Return for cpe 1 month.

## 2022-07-22 DIAGNOSIS — G43.009 MIGRAINE WITHOUT AURA AND WITHOUT STATUS MIGRAINOSUS, NOT INTRACTABLE: ICD-10-CM

## 2022-07-22 NOTE — TELEPHONE ENCOUNTER
----- Message from [facility] sent at 7/22/2022  4:22 PM EDT -----  Subject: Refill Request    QUESTIONS  Name of Medication? butalbital-acetaminophen-caffeine (FIORICET, ESGIC)   -40 MG per tablet  Patient-reported dosage and instructions? 1 tablet by mouth as needed  How many days do you have left? 0  Preferred Pharmacy? [address]  Pharmacy phone number (if available)? [phone]  ---------------------------------------------------------------------------  --------------  Dulce Treviño INFO  What is the best way for the office to contact you? OK to leave message on   voicemail, OK to respond with electronic message via [employer] portal (only   for patients who have registered [employer] account)  Preferred Call Back Phone Number? [phone]  ---------------------------------------------------------------------------  --------------  SCRIPT ANSWERS  Relationship to Patient?  Self

## 2022-07-25 RX ORDER — BUTALBITAL, ACETAMINOPHEN AND CAFFEINE 50; 325; 40 MG/1; MG/1; MG/1
1 TABLET ORAL EVERY 6 HOURS PRN
Qty: 30 TABLET | Refills: 0 | Status: SHIPPED | OUTPATIENT
Start: 2022-07-25

## 2022-08-03 ENCOUNTER — OFFICE VISIT (OUTPATIENT)
Dept: INTERNAL MEDICINE CLINIC | Facility: CLINIC | Age: 57
End: 2022-08-03
Payer: COMMERCIAL

## 2022-08-03 DIAGNOSIS — L30.4 INTERTRIGO: ICD-10-CM

## 2022-08-03 DIAGNOSIS — E55.9 VITAMIN D DEFICIENCY: ICD-10-CM

## 2022-08-03 DIAGNOSIS — R74.8 ELEVATED LIVER ENZYMES: ICD-10-CM

## 2022-08-03 DIAGNOSIS — F33.0 MAJOR DEPRESSIVE DISORDER, RECURRENT, MILD (HCC): ICD-10-CM

## 2022-08-03 DIAGNOSIS — Z12.31 ENCOUNTER FOR SCREENING MAMMOGRAM FOR MALIGNANT NEOPLASM OF BREAST: ICD-10-CM

## 2022-08-03 DIAGNOSIS — Z12.4 SCREENING FOR MALIGNANT NEOPLASM OF CERVIX: ICD-10-CM

## 2022-08-03 DIAGNOSIS — Z00.00 ANNUAL PHYSICAL EXAM: Primary | ICD-10-CM

## 2022-08-03 DIAGNOSIS — E78.5 HYPERLIPIDEMIA, UNSPECIFIED HYPERLIPIDEMIA TYPE: ICD-10-CM

## 2022-08-03 DIAGNOSIS — E66.01 MORBID OBESITY (HCC): ICD-10-CM

## 2022-08-03 DIAGNOSIS — R79.89 ELEVATED LFTS: ICD-10-CM

## 2022-08-03 DIAGNOSIS — E11.40 TYPE 2 DIABETES MELLITUS WITH DIABETIC NEUROPATHY, WITHOUT LONG-TERM CURRENT USE OF INSULIN (HCC): ICD-10-CM

## 2022-08-03 DIAGNOSIS — D22.9 MULTIPLE NEVI: ICD-10-CM

## 2022-08-03 DIAGNOSIS — F33.0 DEPRESSION, MAJOR, RECURRENT, MILD (HCC): ICD-10-CM

## 2022-08-03 DIAGNOSIS — M15.9 OSTEOARTHRITIS OF MULTIPLE JOINTS, UNSPECIFIED OSTEOARTHRITIS TYPE: ICD-10-CM

## 2022-08-03 DIAGNOSIS — I10 ESSENTIAL HYPERTENSION, BENIGN: ICD-10-CM

## 2022-08-03 PROCEDURE — 99396 PREV VISIT EST AGE 40-64: CPT | Performed by: NURSE PRACTITIONER

## 2022-08-03 RX ORDER — CELECOXIB 100 MG/1
100 CAPSULE ORAL 2 TIMES DAILY
Qty: 60 CAPSULE | Refills: 11 | Status: SHIPPED | OUTPATIENT
Start: 2022-08-03

## 2022-08-03 RX ORDER — AMINO ACIDS/CHROMIUM
1000 TABLET ORAL DAILY
COMMUNITY

## 2022-08-03 RX ORDER — ESCITALOPRAM OXALATE 10 MG/1
10 TABLET ORAL DAILY
Qty: 30 TABLET | Refills: 11 | Status: SHIPPED | OUTPATIENT
Start: 2022-08-03

## 2022-08-03 RX ORDER — CLOTRIMAZOLE AND BETAMETHASONE DIPROPIONATE 10; .64 MG/G; MG/G
CREAM TOPICAL
Qty: 45 G | Refills: 3 | Status: SHIPPED | OUTPATIENT
Start: 2022-08-03

## 2022-08-03 ASSESSMENT — PATIENT HEALTH QUESTIONNAIRE - PHQ9
1. LITTLE INTEREST OR PLEASURE IN DOING THINGS: 1
9. THOUGHTS THAT YOU WOULD BE BETTER OFF DEAD, OR OF HURTING YOURSELF: 0
SUM OF ALL RESPONSES TO PHQ QUESTIONS 1-9: 3
5. POOR APPETITE OR OVEREATING: 0
SUM OF ALL RESPONSES TO PHQ QUESTIONS 1-9: 3
2. FEELING DOWN, DEPRESSED OR HOPELESS: 0
3. TROUBLE FALLING OR STAYING ASLEEP: 1
4. FEELING TIRED OR HAVING LITTLE ENERGY: 0
SUM OF ALL RESPONSES TO PHQ QUESTIONS 1-9: 3
10. IF YOU CHECKED OFF ANY PROBLEMS, HOW DIFFICULT HAVE THESE PROBLEMS MADE IT FOR YOU TO DO YOUR WORK, TAKE CARE OF THINGS AT HOME, OR GET ALONG WITH OTHER PEOPLE: 1
SUM OF ALL RESPONSES TO PHQ QUESTIONS 1-9: 3
SUM OF ALL RESPONSES TO PHQ9 QUESTIONS 1 & 2: 1
8. MOVING OR SPEAKING SO SLOWLY THAT OTHER PEOPLE COULD HAVE NOTICED. OR THE OPPOSITE, BEING SO FIGETY OR RESTLESS THAT YOU HAVE BEEN MOVING AROUND A LOT MORE THAN USUAL: 0
7. TROUBLE CONCENTRATING ON THINGS, SUCH AS READING THE NEWSPAPER OR WATCHING TELEVISION: 1
6. FEELING BAD ABOUT YOURSELF - OR THAT YOU ARE A FAILURE OR HAVE LET YOURSELF OR YOUR FAMILY DOWN: 0

## 2022-08-03 ASSESSMENT — ENCOUNTER SYMPTOMS
EYE ITCHING: 0
DIARRHEA: 0
BACK PAIN: 0
SHORTNESS OF BREATH: 0
COUGH: 0
ABDOMINAL PAIN: 0
EYE REDNESS: 0
BLOOD IN STOOL: 0
NAUSEA: 0
COLOR CHANGE: 0
VOMITING: 0
CONSTIPATION: 0

## 2022-08-03 NOTE — PROGRESS NOTES
PROGRESS NOTE      Chief Complaint   Patient presents with    Annual Exam     Pt here for CPE     Results     Labs were done 7/19/22    Medication Refill       HPI    Hypertension: Monitoring blood pressure at home with readings generally 130s/80s. Occasional higher readings. Obesity: Weight increased to 330 lbs but now trending down with diet changes. Not interested in bariatric surgery. Lost few pounds since last visit. Previously going to gym but not recently. Frustrated with difficulty losing weight     Diabetes: Insurance did not cover diabetes educations classes. Increased metformin to 500 bid. Tolerating. Home glucose readings improved since last visit. Cholesterol: Intolerant of Crestor and Lipitor     Headaches: Daily tension headache for years. Fioricet \"is the only thing that helps\". Takes several days per week. Fatigue: Ongoing     Depression: Also diagnosed with ADHD. Lexapro 10 mg Brother is counselor in [place] and talks to him on the phone. Vitamin D deficiency: currently not taking supplement    Dry eyes: Restasis        ASSESSMENT and PLAN    Audrey was seen today for annual exam, results and medication refill. Diagnoses and all orders for this visit:    Annual physical exam  Discussed BMI and healthy weight and diet, weight bearing exercise, smoking avoidance, sun protection and medication compliance. Reviewed appropriate health maintenance screening. The patient was counseled regarding regular monthly SBE, screening procedures and recommended schedules for immunizations, mammography, Pap smears, GI hemoccult testing, colonoscopy and BMD.  Agrees to mammogram. Cologuard due next year. Encounter for screening mammogram for malignant neoplasm of breast  -     [facility] AMANDA DIGITAL SCREEN BILATERAL; Future    Screening for malignant neoplasm of cervix    Major depressive disorder, recurrent, mild (HCC)  -     escitalopram (LEXAPRO) 10 MG tablet;  Take 1 tablet by mouth in the morning. Essential hypertension, benign    Type 2 diabetes mellitus with diabetic neuropathy, without long-term current use of insulin ([facility])  -     empagliflozin (JARDIANCE) 10 MG tablet; Take 1 tablet by mouth in the morning. Morbid obesity ([address]  Working on diet. Return to gym. Not interested in bariatric consult    Osteoarthritis of multiple joints, unspecified osteoarthritis type  -     celecoxib (CELEBREX) 100 MG capsule; Take 1 capsule by mouth in the morning and 1 capsule before bedtime. Multiple nevi  -     [facility] - Esperanza Stanford MD, [place]    Elevated LFTs  Improved. Will repeat next visit. Fatty liver per ultrasound     Vitamin D deficiency    Hyperlipidemia, unspecified hyperlipidemia type  Stopped Crestor. Needs statin. Discuss next visit    Intertrigo  -     clotrimazole-betamethasone (LOTRISONE) 1-0.05 % cream; Apply topically 2 times daily. Medical problems and test results were reviewed with the patient today. Past Medical History, Past Surgical History, Family history, Social History, and Medications were all reviewed and updated as necessary. Current Outpatient Medications   Medication Sig Dispense Refill    Chromium 1000 MCG TABS Take 1,000 mcg by mouth daily      celecoxib (CELEBREX) 100 MG capsule Take 1 capsule by mouth in the morning and 1 capsule before bedtime. 60 capsule 11    escitalopram (LEXAPRO) 10 MG tablet Take 1 tablet by mouth in the morning. 30 tablet 11    empagliflozin (JARDIANCE) 10 MG tablet Take 1 tablet by mouth in the morning. 30 tablet 5    clotrimazole-betamethasone (LOTRISONE) 1-0.05 % cream Apply topically 2 times daily.  45 g 3    butalbital-acetaminophen-caffeine (FIORICET, ESGIC) -40 MG per tablet Take 1 tablet by mouth every 6 hours as needed for Headaches TAKE 1 TABLET BY MOUTH EVERY 6 HOURS AS NEEDED FOR HEADACHE 30 tablet 0    ondansetron (ZOFRAN) 4 MG tablet Take 2 tablets by mouth every 8 hours as needed for Nausea or Vomiting 30 tablet 3 lisinopril (PRINIVIL;ZESTRIL) 20 MG tablet Take 1 tablet by mouth in the morning. 30 tablet 5    metFORMIN (GLUCOPHAGE) 500 MG tablet Take 1 tablet by mouth in the morning and 1 tablet in the evening. Take with meals. 60 tablet 5    omeprazole (PRILOSEC) 20 MG delayed release capsule Take 1 capsule by mouth in the morning. 30 capsule 5    Hyoscyamine Sulfate SL (LEVSIN/SL) 0.125 MG SUBL Place 0.125 mg under the tongue every 6 hours as needed (abdominal pain) 15 each 0    Lancets MISC Test bs daily pt uses accu check elena dx e11.9      albuterol sulfate  (90 Base) MCG/ACT inhaler Inhale 1 puff into the lungs every 6 hours as needed      cycloSPORINE (RESTASIS) 0.05 % ophthalmic emulsion Apply 1 drop to eye every 12 hours      gabapentin (NEURONTIN) 300 MG capsule Take 300 mg by mouth at bedtime. hydroCHLOROthiazide (HYDRODIURIL) 25 MG tablet Take 25 mg by mouth daily       No current facility-administered medications for this visit. Allergies   Allergen Reactions    Aspirin Other (See Comments)     Stomach upset    Hydrocodone-Acetaminophen Nausea And Vomiting     dizziness    Morphine Anxiety       REVIEW OF SYSTEMS    Review of Systems   Constitutional:  Positive for unexpected weight change. Negative for activity change, appetite change and fatigue. HENT:  Negative for congestion and hearing loss. Eyes:  Negative for redness, itching and visual disturbance. Respiratory:  Negative for cough and shortness of breath. Cardiovascular:  Negative for chest pain, palpitations and leg swelling. Gastrointestinal:  Negative for abdominal pain, blood in stool, constipation, diarrhea, nausea and vomiting. Genitourinary:  Negative for dysuria, frequency, hematuria and urgency. Musculoskeletal:  Negative for arthralgias, back pain, myalgias and neck pain. Skin:  Positive for rash. Negative for color change. Neurological:  Negative for dizziness, weakness, numbness and headaches. Psychiatric/Behavioral:  Negative for dysphoric mood and sleep disturbance. The patient is not nervous/anxious. PHYSICAL EXAM    BP (!) 144/94   Ht 5' 7\" (1.702 m)   Wt (!) 326 lb (147.9 kg)   BMI 51.06 kg/m²      Physical Exam  Vitals and nursing note reviewed. Constitutional:       General: She is not in acute distress. Appearance: She is obese. HENT:      Head: Atraumatic. Right Ear: Tympanic membrane and ear canal normal.      Left Ear: Tympanic membrane and ear canal normal.      Nose: Nose normal.   Eyes:      General:         Right eye: No discharge. Left eye: No discharge. Conjunctiva/sclera:      Right eye: Right conjunctiva is injected. Left eye: Left conjunctiva is injected. Neck:      Thyroid: No thyroid mass, thyromegaly or thyroid tenderness. Cardiovascular:      Rate and Rhythm: Normal rate and regular rhythm. Pulses:           Radial pulses are 2+ on the right side and 2+ on the left side. Dorsalis pedis pulses are 2+ on the right side and 2+ on the left side. Posterior tibial pulses are 2+ on the right side and 2+ on the left side. Pulmonary:      Effort: Pulmonary effort is normal.      Breath sounds: Normal breath sounds. Abdominal:      Palpations: Abdomen is soft. There is no hepatomegaly, splenomegaly or mass. Tenderness: There is no abdominal tenderness. Musculoskeletal:      Cervical back: Normal range of motion. Right lower leg: No edema. Left lower leg: No edema. Feet:      Right foot:      Skin integrity: Skin integrity normal.      Toenail Condition: Right toenails are normal.      Left foot:      Skin integrity: Skin integrity normal.      Toenail Condition: Left toenails are normal.   Lymphadenopathy:      Cervical: No cervical adenopathy. Skin:     Findings: Rash present. Comments: Anterior abdominal fold with erythematous patch.  Multiple moles and skin tags neck and trunk   Neurological:

## 2022-08-23 ENCOUNTER — TELEPHONE (OUTPATIENT)
Dept: INTERNAL MEDICINE CLINIC | Facility: CLINIC | Age: 57
End: 2022-08-23

## 2022-08-23 NOTE — TELEPHONE ENCOUNTER
----- Message from Adrianna Torres sent at 8/23/2022  9:29 AM EDT -----  Subject: Referral Request    Reason for referral request? Patient is asking for an MRI on her back. She   said that it needs to be an open MRI . She said there is something wrong   with her back she is having a hard time walking. Provider patient wants to be referred to(if known):     Provider Phone Number(if known):     Additional Information for Provider?   ---------------------------------------------------------------------------  --------------  [email or web address]    [phone]; OK to leave message on voicemail  ---------------------------------------------------------------------------  --------------

## 2022-08-23 NOTE — TELEPHONE ENCOUNTER
RJTCO to discuss back pain , t was seen here on 8/3/22 for her CPE  Pt will need an kisha to discuss back pain

## 2022-09-01 ENCOUNTER — OFFICE VISIT (OUTPATIENT)
Dept: INTERNAL MEDICINE CLINIC | Facility: CLINIC | Age: 57
End: 2022-09-01
Payer: COMMERCIAL

## 2022-09-01 VITALS
WEIGHT: 293 LBS | SYSTOLIC BLOOD PRESSURE: 126 MMHG | HEIGHT: 65 IN | DIASTOLIC BLOOD PRESSURE: 82 MMHG | BODY MASS INDEX: 48.82 KG/M2

## 2022-09-01 VITALS
SYSTOLIC BLOOD PRESSURE: 144 MMHG | WEIGHT: 293 LBS | BODY MASS INDEX: 48.82 KG/M2 | HEIGHT: 65 IN | DIASTOLIC BLOOD PRESSURE: 94 MMHG

## 2022-09-01 DIAGNOSIS — M48.061 SPINAL STENOSIS OF LUMBAR REGION, UNSPECIFIED WHETHER NEUROGENIC CLAUDICATION PRESENT: Primary | ICD-10-CM

## 2022-09-01 DIAGNOSIS — G89.29 CHRONIC RIGHT-SIDED LOW BACK PAIN WITH RIGHT-SIDED SCIATICA: ICD-10-CM

## 2022-09-01 DIAGNOSIS — M54.41 CHRONIC RIGHT-SIDED LOW BACK PAIN WITH RIGHT-SIDED SCIATICA: ICD-10-CM

## 2022-09-01 PROCEDURE — 3017F COLORECTAL CA SCREEN DOC REV: CPT | Performed by: NURSE PRACTITIONER

## 2022-09-01 PROCEDURE — 99214 OFFICE O/P EST MOD 30 MIN: CPT | Performed by: NURSE PRACTITIONER

## 2022-09-01 PROCEDURE — G8427 DOCREV CUR MEDS BY ELIG CLIN: HCPCS | Performed by: NURSE PRACTITIONER

## 2022-09-01 PROCEDURE — G8417 CALC BMI ABV UP PARAM F/U: HCPCS | Performed by: NURSE PRACTITIONER

## 2022-09-01 PROCEDURE — 1036F TOBACCO NON-USER: CPT | Performed by: NURSE PRACTITIONER

## 2022-09-01 RX ORDER — PREDNISONE 5 MG/1
TABLET ORAL
Qty: 1 EACH | Refills: 0 | Status: SHIPPED | OUTPATIENT
Start: 2022-09-01

## 2022-09-01 ASSESSMENT — ENCOUNTER SYMPTOMS: BACK PAIN: 1

## 2022-09-01 NOTE — TELEPHONE ENCOUNTER
I called pt to check on her today (she never called me back) I made her an kisha today at 3 pm with Марина Fatima

## 2022-09-01 NOTE — PROGRESS NOTES
PROGRESS NOTE      Chief Complaint   Patient presents with    Back Pain     Pt c/o low back pain more on her right side , pt having issues at times standing and walking  Pain now 6 out of 10 but at times pain has been 10/10     Diabetes     Pt has never started taking Jardiance  since reading the side effects of this rx        HPI    Back Pain  This is a recurrent (Chronic low back pain. MRI 2018 - multilevel spinal stenosis. Epidural without benefit and ?adverse effect. Pain is now severe.) problem. The current episode started 1 to 4 weeks ago. The problem occurs constantly. The problem has been gradually worsening since onset. The pain is present in the lumbar spine. The quality of the pain is described as aching. The pain radiates to the right foot. The pain is at a severity of 6/10. The pain is moderate. The symptoms are aggravated by lying down, sitting and standing. Associated symptoms include numbness and weakness. Pertinent negatives include no fever. Treatments tried: PT in remote past. Tylenol without benfit. The treatment provided no relief. Diabetes  Diabetes visit type: Continues metformin. Did not start Jardiance due to concerns over side effects. Fasting glucose now 130s. She has type 2 diabetes mellitus. Her disease course has been improving. Associated symptoms include weakness. ASSESSMENT and PLAN    Audrey was seen today for back pain and diabetes. Diagnoses and all orders for this visit:    Spinal stenosis of lumbar region, unspecified whether neurogenic claudication present  -     predniSONE 5 MG (21) TBPK; As directed  -     REHABILITATION [facility] - Physical [facility] [address] [facility]; Future    Chronic right-sided low back pain with right-sided sciatica  -     predniSONE 5 MG (21) TBPK; As directed  -     REHABILITATION [facility] - Physical [address]; Future    Will treat with prednisone.  Monitor glucose carefully. Refer to PT and schedule MRI of lumbar spine with severe pain and some weakness. Encouraged to consider Jardiance. Medical problems and test results were reviewed with the patient today. Past Medical History, Past Surgical History, Family history, Social History, and Medications were all reviewed and updated as necessary. Current Outpatient Medications   Medication Sig Dispense Refill    predniSONE 5 MG (21) TBPK As directed 1 each 0    Chromium 1000 MCG TABS Take 1,000 mcg by mouth daily      celecoxib (CELEBREX) 100 MG capsule Take 1 capsule by mouth in the morning and 1 capsule before bedtime. 60 capsule 11    escitalopram (LEXAPRO) 10 MG tablet Take 1 tablet by mouth in the morning. 30 tablet 11    clotrimazole-betamethasone (LOTRISONE) 1-0.05 % cream Apply topically 2 times daily. (Patient taking differently: Apply topically 2 times daily as needed) 45 g 3    butalbital-acetaminophen-caffeine (FIORICET, ESGIC) -40 MG per tablet Take 1 tablet by mouth every 6 hours as needed for Headaches TAKE 1 TABLET BY MOUTH EVERY 6 HOURS AS NEEDED FOR HEADACHE 30 tablet 0    ondansetron (ZOFRAN) 4 MG tablet Take 2 tablets by mouth every 8 hours as needed for Nausea or Vomiting 30 tablet 3    lisinopril (PRINIVIL;ZESTRIL) 20 MG tablet Take 1 tablet by mouth in the morning. 30 tablet 5    metFORMIN (GLUCOPHAGE) 500 MG tablet Take 1 tablet by mouth in the morning and 1 tablet in the evening. Take with meals. 60 tablet 5    omeprazole (PRILOSEC) 20 MG delayed release capsule Take 1 capsule by mouth in the morning.  30 capsule 5    Hyoscyamine Sulfate SL (LEVSIN/SL) 0.125 MG SUBL Place 0.125 mg under the tongue every 6 hours as needed (abdominal pain) 15 each 0    Lancets MISC 1 each 4 times daily pt uses accu check elena dx e11.9      albuterol sulfate  (90 Base) MCG/ACT inhaler Inhale 1 puff into the lungs every 6 hours as needed      cycloSPORINE (RESTASIS) 0.05 % ophthalmic emulsion Apply 1 drop to eye every 12 hours      gabapentin (NEURONTIN) 300 MG capsule Take 300 mg by mouth at bedtime. hydroCHLOROthiazide (HYDRODIURIL) 25 MG tablet Take 25 mg by mouth daily      empagliflozin (JARDIANCE) 10 MG tablet Take 1 tablet by mouth in the morning. (Patient not taking: Reported on 9/1/2022) 30 tablet 5     No current facility-administered medications for this visit. Allergies   Allergen Reactions    Aspirin Other (See Comments)     Stomach upset    Hydrocodone-Acetaminophen Nausea And Vomiting     dizziness    Morphine Anxiety       REVIEW OF SYSTEMS    Review of Systems   Constitutional:  Negative for chills and fever. Musculoskeletal:  Positive for back pain. Neurological:  Positive for weakness and numbness. PHYSICAL EXAM    /82   Ht 5' 5\" (1.651 m)   Wt (!) 323 lb (146.5 kg)   BMI 53.75 kg/m²      Physical Exam  Constitutional:       General: She is in acute distress (appears uncomfortable). Appearance: She is obese. Pulmonary:      Effort: Pulmonary effort is normal.   Musculoskeletal:      Lumbar back: Spasms and tenderness present. Decreased range of motion. Comments: Limited forward flexion   Neurological:      Mental Status: She is alert. Comments: Heel and toe walk intact. Psychiatric:         Mood and Affect: Mood normal.         Behavior: Behavior normal.        FOLLOW UP    Return for Pending test results.

## 2022-09-06 ENCOUNTER — TELEPHONE (OUTPATIENT)
Dept: INTERNAL MEDICINE CLINIC | Facility: CLINIC | Age: 57
End: 2022-09-06

## 2022-09-06 DIAGNOSIS — F41.9 ANXIETY: Primary | ICD-10-CM

## 2022-09-06 NOTE — TELEPHONE ENCOUNTER
Patient called through [facility] ([facility]) asking for Rx for anxiety to take prior to having the MRI done on 9/17-[employer]/TD

## 2022-09-08 ENCOUNTER — HOSPITAL ENCOUNTER (OUTPATIENT)
Dept: PHYSICAL THERAPY | Age: 57
Setting detail: RECURRING SERIES
Discharge: HOME | End: 2022-09-11
Payer: COMMERCIAL

## 2022-09-08 PROCEDURE — 97110 THERAPEUTIC EXERCISES: CPT

## 2022-09-08 PROCEDURE — 97161 PT EVAL LOW COMPLEX 20 MIN: CPT

## 2022-09-08 ASSESSMENT — PAIN DESCRIPTION - ORIENTATION: ORIENTATION: LOWER

## 2022-09-08 ASSESSMENT — PAIN DESCRIPTION - LOCATION: LOCATION: BACK

## 2022-09-08 ASSESSMENT — PAIN DESCRIPTION - DESCRIPTORS: DESCRIPTORS: SHARP;ACHING

## 2022-09-08 ASSESSMENT — PAIN SCALES - GENERAL: PAINLEVEL_OUTOF10: 7

## 2022-09-08 NOTE — THERAPY EVALUATION
[address]  : 1965  Primary: [facility]  Secondary:  [address] @ [address] [place] [address] [address] [address]  Phone: [phone]  Fax: [phone] Plan Frequency: 2 visits per week for 8 weeks. Plan of Care/Certification Expiration Date: 22 (Start of  [address] 2022)    PT Visit Info:    No data recorded    Visit Count:  1    OUTPATIENT PHYSICAL THERAPY:OP NOTE TYPE: Initial Assessment 2022               Episode  Appt Desk         Treatment Diagnosis:  Low back pain (M54.5)  Spinal stenosis of lumbar region, unspecified whether neurogenic claudication present (M48.061)  Low back pain with right sided sciatica (M54.41)  Other chronic pain (G89.29)  Medical/Referring Diagnosis:  Spinal stenosis of lumbar region, unspecified whether neurogenic claudication present [M48.061]  Chronic right-sided low back pain with right-sided sciatica [M54.41, G89.29]  Referring Physician:  CESILIA Diaz - CNP  MD Orders:  PT Eval and Treat   Return MD Appt:  2022  Date of Onset:  Onset Date: 22 (Chronic low back pain with current episode exacerbation approximately 4 weeks ago.)   Allergies:  Aspirin, Hydrocodone-acetaminophen, and Morphine  Restrictions/Precautions:    Restrictions/Precautions: Other (comment) (DM II)  Medications Last Reviewed:  2022     SUBJECTIVE   History of Injury/Illness (Reason for Referral):  Mrs. DONAHUE [facility] is a 63 y/o female with complaints of low back pain with radiating BLE symptoms chronic in nature with insidious exacerbation of symptoms approximately 4 weeks ago. Pt reports symptoms seem to be worsening since exacerbation began. Pt has known lumbar spine stenosis and is scheduled for updated MRI imaging on 2022. Pt reports numbness/tingling/weakness into BLEs; pt denies  bowel/bladder dysfunction.  Pt reports severe difficulty walking long distances and unable to sleep in bed due to inability to lay supine and/or side lying. Pt referred by MD for PT eval and treat. Pt will benefit from skilled PT treatment to address deficits in strength, AROM, balance, gait and functional mobility in order to return to prior level of function and improve quality of life. Patient Stated Goal(s):  \"Reduce pain, improve walking\"  Initial: Lower Back 7/10 Post Session: Lower Back 5/10  Past Medical History/Comorbidities:   Ms. Olvera  has a past medical history of Anxiety, Asthma, Depression, GERD (gastroesophageal reflux disease), Headache, Hx of seasonal allergies, and Hypertension. Ms. Olvera  has a past surgical history that includes Appendectomy (02/22/2020); Hysterectomy (2014); and Ovary removal.  Social History/Living Environment:   Lives With: Spouse  Type of Home: House  Home Layout: One level   Prior Level of Function/Work/Activity:   Prior level of function: Independent  Learning:   Does the patient/guardian have any barriers to learning?: No barriers  Will there be a co-learner?: No  What is the preferred language of the patient/guardian?: English  Is an  required?: No  How does the patient/guardian prefer to learn new concepts?: Listening; Reading; Demonstration; Pictures/Videos   Fall Risk Scale: Total Score: 10  Dorman Fall Risk: Low (0-24)         OBJECTIVE   Observation/Postural and Gait Assessment: Gait: Increased lateral trunk lean during stance phase bilaterally, decreased gait speed; Posture: Moderate rounded shoulder and forward head. Palpation: Moderate tenderness to palpation noted lower lumbar paraspinals, glutes.     AROM:   Lumbar extension: neutral°   Lumbar flexion: 30°   Lumbar left side bend: 15°   Lumbar right side bend: 10°       Strength:  Manual Muscle Test (out of 5) Left Right   Knee extension 4 4-   Knee flexion 4- 4-   Hip flexion 3+ 3+   Hip abduction NT NT   Ankle DF 4+ 4-   Ankle PF NT NT     Passive Accessory Motion: NT due to patient unable to lay prone.    Special Tests:  Unable to tolerate due to high pain irritability. Neurological Screen:  Myotomes: Key muscle strength testing through bilateral LE is diminished L4/5 RLE. Dermatomes: Sensation testing through bilateral lower quadrants for light touch is intact. Reflexes: Patellar (L4) and Achilles (S1) are normal and diminished. Neural tension tests: Passive straight leg raise (SLR) test is NT. Crossed SLR test is NT. Slump test is NT. Femoral nerve stretch test is NT. Functional Mobility:  Sit to stand: Independent with BUE assist.  Supine to sit: Min to Mod A, uses log roll but requires verbal cues for correct performance. ASSESSMENT   Initial Assessment:  Mrs. Ronny Goel is a 63 y/o female with complaints of low back pain with radiating BLE symptoms chronic in nature with insidious exacerbation of symptoms approximately 4 weeks ago. Pt reports symptoms seem to be worsening since exacerbation began. Pt has known lumbar spine stenosis and is scheduled for updated MRI imaging on 9/17/2022. Pt reports numbness/tingling/weakness into BLEs; pt denies  bowel/bladder dysfunction. Pt reports severe difficulty walking long distances and unable to sleep in bed due to inability to lay supine and/or side lying. Pt referred by MD for PT eval and treat. Pt will benefit from skilled PT treatment to address deficits in strength, AROM, balance, gait and functional mobility in order to return to prior level of function and improve quality of life. Problem List: (Impacting functional limitations): Body Structures, Functions, Activity Limitations Requiring Skilled Therapeutic Intervention: Decreased functional mobility ; Decreased ADL status; Decreased ROM; Decreased body mechanics; Decreased tolerance to work activity; Decreased strength; Decreased endurance; Decreased sensation; Decreased balance; Decreased high-level IADLs; Decreased coordination;  Increased pain; Decreased posture   Therapy Prognosis: Therapy Prognosis: Fair   Assessment Complexity:   Low Complexity  PLAN   Effective Dates: 9/8/2022 TO Plan of Care/Certification Expiration Date: 11/03/22 (Start of [address] 9/8/2022)   Frequency/Duration: Plan Frequency: 2 visits per week for 8 weeks. Interventions Planned (Treatment may consist of any combination of the following):    Current Treatment Recommendations: Strengthening; ROM; Balance training; Functional mobility training; Transfer training; ADL/Self-care training; Endurance training; Gait training; Stair training; Neuromuscular re-education; Manual Therapy - Soft Tissue Mobilization; Manual Therapy - Joint Manipulation; Pain management; Return to work related activity; Home exercise program; Safety education & training; Patient/Caregiver education & training; Equipment evaluation, education, & procurement; Modalities; Integrated dry needling; Therapeutic activities   Goals: (Goals have been discussed and agreed upon with patient.)  Short-Term Functional Goals: Time Frame: 9/8/2022 to 10/6/2022  Patient demonstrates independence with home exercise program without verbal cueing provided by therapist.  Pt will report worst pain 3/10 or less at rest in order to return to unrestricted prolonged sitting 30 mins or greater. Pt will report unrestricted sleeping through night 75% of week in order to progress towards sleeping habits consistent with prior level of function. Pt will demonstrate independent supine to sit transfer using log roll technique in order to improve independence and safety with functional transfers. Discharge Goals: Time Frame: 9/8/2022 to 11/3/2022  Pt will report worst pain 3/10 or less with prolonged standing/walking 15 mins or greater in order to return to unrestricted community distance ambulation. Pt will report return to light house work activities with 3/10 pain or less.   Pt will demonstrate gross BLE strength 4/5 or greater all planes in order to return to unrestricted childcare activities. ADELFO score of 15/50 or less/greater in order to demonstrate overall functional improvement. Outcome Measure: Tool Used: Modified Oswestry Low Back Pain Questionnaire  Score:  Initial: 33/50  Most Recent: X/50 (Date: -- )   Interpretation of Score: Each section is scored on a 0-5 scale, 5 representing the greatest disability. The scores of each section are added together for a total score of 50. Medical Necessity:   > Patient is expected to demonstrate progress in strength, range of motion, balance, coordination, and functional technique to increase independence with community distance ambulation, functional transfers, ADLs and light house work activities. > Skilled intervention continues to be required due to decreased AROM, decreased strength, decreased balance, decreased gait, decreased functional mobility. Reason For Services/Other Comments:  > Patient continues to require skilled intervention due to increasing complexity of exercise. Total Duration:  Time In: 1445  Time Out: 5753    Regarding Audrey Gage's therapy, I certify that the treatment plan above will be carried out by a therapist or under their direction. Thank you for this referral,  Ori Franco, PT     Referring Physician Signature:  Debra Galaviz _______________________________ Date _____________        Post Session Pain  Charge Capture  PT Visit Info MD Andrew Shirley

## 2022-09-08 NOTE — PROGRESS NOTES
[address]  : 1965  Primary: [facility]  Secondary:  Rahel Roland @ [address] [facility] [address]  Phone: [phone]  Fax: [phone] Plan Frequency: 2 visits per week for 8 weeks. Plan of Care/Certification Expiration Date: 22 (Start of [address] 2022)    PT Visit Info:  No data recorded   Visit Count:  1   OUTPATIENT PHYSICAL THERAPY:OP NOTE TYPE: Treatment Note 2022       Episode  }Appt Desk             Treatment Diagnosis:  Low back pain (M54.5)  Spinal stenosis of lumbar region, unspecified whether neurogenic claudication present (M48.061)  Low back pain with right sided sciatica (M54.41)  Other chronic pain (G89.29)  Medical/Referring Diagnosis:  Spinal stenosis of lumbar region, unspecified whether neurogenic claudication present [M48.061]  Chronic right-sided low back pain with right-sided sciatica [M54.41, G89.29]  Referring Physician:  CESILIA Hadley CNP, MD Orders:  PT Eval and Treat   Date of Onset:  Onset Date: 22 (Chronic low back pain with current episode exacerbation approximately 4 weeks ago.)   Allergies:   Aspirin, Hydrocodone-acetaminophen, and Morphine  Restrictions/Precautions:  Restrictions/Precautions: Other (comment) (DM II)No data recorded   Interventions Planned (Treatment may consist of any combination of the following):    Current Treatment Recommendations: Strengthening; ROM; Balance training; Functional mobility training; Transfer training; ADL/Self-care training; Endurance training; Gait training; Stair training; Neuromuscular re-education; Manual Therapy - Soft Tissue Mobilization; Manual Therapy - Joint Manipulation; Pain management; Return to work related activity; Home exercise program; Safety education & training; Patient/Caregiver education & training; Equipment evaluation, education, & procurement; Modalities; Integrated dry needling;  Therapeutic activities   Subjective Comments:  Pt reports severe low back pain at start of treatment. Initial:}Lower Back 7/10Post Session:  Lower  Back 5/10  Medications Last Reviewed:  9/8/2022  Updated Objective Findings:  See evaluation note from today  Treatment   THERAPEUTIC EXERCISE: (38 minutes):    Exercises per grid below to improve mobility, strength, and coordination. Required moderate visual, verbal, manual, and tactile cues to promote proper body alignment, promote proper body poster and proper body mechanics. Progressed resistance, range, repetitions and complexity of movement as indicated. Date:  9/8/2022 Date:   Date:     Activity/Exercise Parameters Parameters Parameters   Posterior pelvic tilt X5 supine, stopped due to pain    Sitting, 2x10     TA activation Next visit     Seated lumbar flexion x10     Transfer training Sit to stand and supine to sit using log roll technique, 15 mins. Time spent with patient reviewing proper muscle recruitment and technique with exercises. MANUAL THERAPY: (0 minutes):   Joint mobilization and Soft tissue mobilization was utilized and necessary because of the patient's restricted joint motion, painful spasm, loss of articular motion, and restricted motion of soft tissue. --None today. MODALITIES: (0 minutes):        None today. HEP: As above; handouts given to patient for all exercises. Treatment/Session Summary:    Treatment Assessment:  Pt does demonstrate good symptom response to flexion specific exercise program. Fair tolerance to exercise program overall. Communication/Consultation:   HEP handout provided. Equipment provided today:  None  Recommendations/Intent for next treatment session: Next visit will focus on Pain/inflammation control, improve lumbar mobility, improve core strength, improve functional mobility. .    Total Treatment Billable Duration:  58 minutes, 20 mins eval, 38 mins therex.   Time In: 6241  Time Out: 420 [facility], PT       Charge Capture  }Post Session Pain  PT Visit Info  [facility] Portal  MD Guidelines  Scanned Media  Benefits  MyChart    Future Appointments   Date Time Provider Dylan Pily   9/14/2022 12:30 PM Denece Lips, PT [facility]   9/16/2022 10:00 AM Thelbert Sandifer, PTA [facility]   9/17/2022 10:00 AM [facility] MRI UNIT 1 [facility]   9/21/2022 10:00 AM Denece Lips, PT [facility]   9/23/2022 10:00 AM Thelbert Sandifer, PTA [facility]   9/27/2022  3:00 PM Jason Allen, APRN - CNP [facility]   9/28/2022  1:30 PM Denece Lips, PT [facility]   9/30/2022 10:00 AM Thelbert Sandifer, PTA [facility]   10/5/2022 10:00 AM Denece Lips, PT [facility]   10/7/2022 10:00 AM Thelbert Sandifer, PTA [facility]   10/12/2022 10:00 AM Denece Lips, PT [facility]   10/14/2022 10:00 AM Thelbert Sandifer, PTA [facility]   10/19/2022 10:00 AM Denece Lips, PT [facility]   10/21/2022 10:00 AM Thelbert Sandifer, PTA [facility]   10/26/2022 10:00 AM Denece Lips, PT [facility]   10/28/2022 10:00 AM Thelbert Sandifer, PTA [facility]   11/2/2022 10:00 AM Thelbert Sandifer, PTA [facility]   11/4/2022 10:00 AM Denece Lips, PT [facility]

## 2022-09-12 RX ORDER — DIAZEPAM 5 MG/1
TABLET ORAL
Qty: 2 TABLET | Refills: 0 | Status: SHIPPED | OUTPATIENT
Start: 2022-09-12 | End: 2022-11-12

## 2022-09-12 NOTE — TELEPHONE ENCOUNTER
I sent in rx for her to take 1 hour before MRI.  Caution sedation and she will require someone to take her to and drive her home from this procedure

## 2022-09-14 ENCOUNTER — HOSPITAL ENCOUNTER (OUTPATIENT)
Dept: PHYSICAL THERAPY | Age: 57
Setting detail: RECURRING SERIES
End: 2022-09-14
Payer: COMMERCIAL

## 2022-09-14 NOTE — PROGRESS NOTES
Physical Therapy  [address] [place] [address]  Date: 9/14/2022    Patient cancelled appointment for today due to family emergency.       REBECA YoungT

## 2022-09-15 NOTE — TELEPHONE ENCOUNTER
Pt was aware to take new rx Valium one hr before there MRI kisha on 9/17 and she does plan on having her  drive her that day

## 2022-09-16 ENCOUNTER — APPOINTMENT (OUTPATIENT)
Dept: PHYSICAL THERAPY | Age: 57
End: 2022-09-16
Payer: COMMERCIAL

## 2022-09-17 ENCOUNTER — HOSPITAL ENCOUNTER (OUTPATIENT)
Dept: MRI IMAGING | Age: 57
Discharge: HOME | End: 2022-09-20
Payer: COMMERCIAL

## 2022-09-17 DIAGNOSIS — G89.29 CHRONIC RIGHT-SIDED LOW BACK PAIN WITH RIGHT-SIDED SCIATICA: ICD-10-CM

## 2022-09-17 DIAGNOSIS — M54.41 CHRONIC RIGHT-SIDED LOW BACK PAIN WITH RIGHT-SIDED SCIATICA: ICD-10-CM

## 2022-09-17 DIAGNOSIS — M48.061 SPINAL STENOSIS OF LUMBAR REGION, UNSPECIFIED WHETHER NEUROGENIC CLAUDICATION PRESENT: ICD-10-CM

## 2022-09-17 PROCEDURE — 72148 MRI LUMBAR SPINE W/O DYE: CPT

## 2022-09-20 DIAGNOSIS — M48.061 SPINAL STENOSIS OF LUMBAR REGION, UNSPECIFIED WHETHER NEUROGENIC CLAUDICATION PRESENT: Primary | ICD-10-CM

## 2022-09-21 ENCOUNTER — HOSPITAL ENCOUNTER (OUTPATIENT)
Dept: PHYSICAL THERAPY | Age: 57
Setting detail: RECURRING SERIES
Discharge: HOME | End: 2022-09-24
Payer: COMMERCIAL

## 2022-09-21 PROCEDURE — 97110 THERAPEUTIC EXERCISES: CPT

## 2022-09-21 ASSESSMENT — PAIN DESCRIPTION - ORIENTATION: ORIENTATION: LOWER

## 2022-09-21 ASSESSMENT — PAIN DESCRIPTION - LOCATION: LOCATION: BACK

## 2022-09-21 ASSESSMENT — PAIN SCALES - GENERAL: PAINLEVEL_OUTOF10: 6

## 2022-09-21 ASSESSMENT — PAIN DESCRIPTION - DESCRIPTORS: DESCRIPTORS: SHARP;TIGHTNESS

## 2022-09-21 NOTE — PROGRESS NOTES
[address]  : 1965  Primary: [facility]  Secondary:  [address] @ [address] [place] [id number]  Phone: [phone]  Fax: [phone] Plan Frequency: 2 visits per week for 8 weeks. Plan of Care/Certification Expiration Date: 22 (Start of  [address] 2022)      PT Visit Info:  No data recorded   Visit Count:  2   OUTPATIENT PHYSICAL THERAPY:OP NOTE TYPE: Treatment Note 2022       Episode  }Appt Desk             Treatment Diagnosis:  Low back pain (M54.5)  Spinal stenosis of lumbar region, unspecified whether neurogenic claudication present (M48.061)  Low back pain with right sided sciatica (M54.41)  Other chronic pain (G89.29)  Medical/Referring Diagnosis:  Spinal stenosis, lumbar region without neurogenic claudication [M48.061]  Lumbago with sciatica, right side [M54.41]  Other chronic pain [G89.29]  Referring Physician:  CESILIA Beauchamp CNP, MD Orders:  PT Eval and Treat   Date of Onset:  Onset Date: 22 (Chronic low back pain with current episode exacerbation approximately 4 weeks ago.)     Allergies:   Aspirin, Hydrocodone-acetaminophen, and Morphine  Restrictions/Precautions:  Restrictions/Precautions: Other (comment) (DM II)  No data recorded   Interventions Planned (Treatment may consist of any combination of the following):    Current Treatment Recommendations: Strengthening; ROM; Balance training; Functional mobility training; Transfer training; ADL/Self-care training; Endurance training; Gait training; Stair training; Neuromuscular re-education; Manual Therapy - Soft Tissue Mobilization; Manual Therapy - Joint Manipulation; Pain management; Return to work related activity; Home exercise program; Safety education & training; Patient/Caregiver education & training; Equipment evaluation, education, & procurement; Modalities; Integrated dry needling;  Therapeutic activities     Subjective Comments:  Pt reports recent MRI was \"difficult\" however she was able to complete with min to mod back pain. Pt reports exercises at home are \"going well\" so far. Initial:}Lower Back 6/10Post Session:  Lower  Back 5/10  Medications Last Reviewed:  9/21/2022  Updated Objective Findings:   Posture: thoracic kyphosis in sitting. Treatment   THERAPEUTIC EXERCISE: (40 minutes):    Exercises per grid below to improve mobility, strength, and coordination. Required moderate visual, verbal, manual, and tactile cues to promote proper body alignment, promote proper body poster and proper body mechanics. Progressed resistance, range, repetitions and complexity of movement as indicated. Date:  9/8/2022 Date:  9/21/22 Date:     Activity/Exercise Parameters Parameters Parameters   Posterior pelvic tilt X5 supine, stopped due to pain    Sitting, 2x10 Attempted in supine however pt unable due to pain. Seated 2x10    TA activation Next visit Verbal review    Seated lumbar flexion x10 Stability ball, 3x10 fwd    Stability ball x10 left/right    Hands on knees; 2x10    Transfer training Sit to stand and supine to sit using log roll technique, 15 mins. Log roll technique supine to sit; Sit to stand from table and chair. Time spent with patient reviewing proper muscle recruitment and technique with exercises. Time spent reviewing MRI results with patient to ensure patient understanding. MANUAL THERAPY: (0 minutes):   Joint mobilization and Soft tissue mobilization was utilized and necessary because of the patient's restricted joint motion, painful spasm, loss of articular motion, and restricted motion of soft tissue. --None today- patient unable to lay on table due to pain. MODALITIES: (8 minutes, no charge): *  Hot Pack Therapy in order to provide analgesia and relieve muscle spasm. HEP: As above; handouts given to patient for all exercises.       Treatment/Session Summary: Treatment Assessment:  Pt continues to demonstrate significant symptom increases with laying supine and unable to perform therapeutic exercise laying on table for this reason; exercises kept in seated position and good symptom response noted with repeated lumbar flexion exercise. Moist heat provided at end of treatment to control pain levels and reduce muscle tightnessin low back. Communication/Consultation:   HEP handout provided. Equipment provided today:  None  Recommendations/Intent for next treatment session: Next visit will focus on Pain/inflammation control, improve lumbar mobility, improve core strength, improve functional mobility. .    Total Treatment Billable Duration:  40 mins.   Time In: 1005  Time Out: [address], PT       Charge Capture  }Post Session Pain  PT Visit Info  [facility] Portal  MD Guidelines  Scanned Media  Benefits  MyChart    Future Appointments   Date Time Provider Dylan Nascimento   9/23/2022 10:00 AM Solmon Iredell, PTA [facility]   9/27/2022  3:00 PM Wali Laguerre, APRN - CNP [facility]   9/28/2022  8:00 PM Lynn Bowens, PT [facility]   9/30/2022 10:00 AM Solmon Jane, PTA [facility]   10/5/2022 10:00 AM Lynn Bowens, PT [facility]   10/7/2022 10:00 AM Solmon Jane, PTA [facility]   10/12/2022 10:00 AM Lynn Bowens, PT [facility]   10/14/2022 10:00 AM Solmon Jane, PTA [facility]   10/19/2022 10:00 AM Lynn Bowens, PT [facility]   10/21/2022 10:00 AM Solmon Jane, PTA [facility]   10/26/2022 10:00 AM Lynn Bowens, PT [facility]   10/28/2022 10:00 AM Solmon Iredell, PTA [facility]   11/2/2022 10:00 AM Solmon Jane, PTA [facility]   11/4/2022 10:00 AM Lynn Bowens, PT [facility]

## 2022-09-23 ENCOUNTER — HOSPITAL ENCOUNTER (OUTPATIENT)
Dept: PHYSICAL THERAPY | Age: 57
Setting detail: RECURRING SERIES
End: 2022-09-23
Payer: COMMERCIAL

## 2022-09-28 ENCOUNTER — HOSPITAL ENCOUNTER (OUTPATIENT)
Dept: PHYSICAL THERAPY | Age: 57
Setting detail: RECURRING SERIES
End: 2022-09-28
Payer: COMMERCIAL

## 2022-09-28 NOTE — PROGRESS NOTES
Physical Therapy  [address] [place] [address]  Date: 9/28/2022    Patient cancelled due to conflict.       REBECA DonT

## 2022-10-05 ENCOUNTER — HOSPITAL ENCOUNTER (OUTPATIENT)
Dept: PHYSICAL THERAPY | Age: 57
Setting detail: RECURRING SERIES
Discharge: HOME | End: 2022-10-08
Payer: COMMERCIAL

## 2022-10-05 PROCEDURE — 97110 THERAPEUTIC EXERCISES: CPT

## 2022-10-05 ASSESSMENT — PAIN SCALES - GENERAL: PAINLEVEL_OUTOF10: 6

## 2022-10-05 NOTE — PROGRESS NOTES
[address]  : 1965  Primary: [facility]  Secondary:  [address] @ [address] [place] [id number]  Phone: [phone]  Fax: [phone] Plan Frequency: 2 visits per week for 8 weeks. Plan of Care/Certification Expiration Date: 22 (Start of [address] 2022)      PT Visit Info:  No data recorded   Visit Count:  3   OUTPATIENT PHYSICAL THERAPY:OP NOTE TYPE: Treatment Note 10/5/2022       Episode  }Appt Desk             Treatment Diagnosis:  Low back pain (M54.5)  Spinal stenosis of lumbar region, unspecified whether neurogenic claudication present (M48.061)  Low back pain with right sided sciatica (M54.41)  Other chronic pain (G89.29)  Medical/Referring Diagnosis:  Spinal stenosis, lumbar region without neurogenic claudication [M48.061]  Lumbago with sciatica, right side [M54.41]  Other chronic pain [G89.29]  Referring Physician:  CESILIA Velásquez CNP, MD Orders:  PT Eval and Treat   Date of Onset:  Onset Date: 22 (Chronic low back pain with current episode exacerbation approximately 4 weeks ago.)     Allergies:   Aspirin, Hydrocodone-acetaminophen, and Morphine  Restrictions/Precautions:  Restrictions/Precautions: Other (comment) (DM II)  No data recorded   Interventions Planned (Treatment may consist of any combination of the following):    Current Treatment Recommendations: Strengthening; ROM; Balance training; Functional mobility training; Transfer training; ADL/Self-care training; Endurance training; Gait training; Stair training; Neuromuscular re-education; Manual Therapy - Soft Tissue Mobilization; Manual Therapy - Joint Manipulation; Pain management; Return to work related activity; Home exercise program; Safety education & training; Patient/Caregiver education & training; Equipment evaluation, education, & procurement; Modalities; Integrated dry needling;  Therapeutic activities     Subjective Comments:  Per pt  exercises at home are going well; pt reports moderate pain at start of treatment today. Initial:}    6/10Post Session:       3/10  Medications Last Reviewed:  10/5/2022  Updated Objective Findings:   See progress note dated 10/5/2022  Treatment   THERAPEUTIC EXERCISE: (53 minutes):    Exercises per grid below to improve mobility, strength, and coordination. Required moderate visual, verbal, manual, and tactile cues to promote proper body alignment, promote proper body poster and proper body mechanics. Progressed resistance, range, repetitions and complexity of movement as indicated. Date:  9/8/2022 Date:  9/21/22 Date:  10/5/22   Activity/Exercise Parameters Parameters Parameters   Posterior pelvic tilt X5 supine, stopped due to pain    Sitting, 2x10 Attempted in supine however pt unable due to pain. Seated 2x10 Seated, 3x10   TA activation Next visit Verbal review Seated, 2x10 5 sec   Seated lumbar flexion x10 Stability ball, 3x10 fwd    Stability ball x10 left/right    Hands on knees; 2x10 Stability ball, 10x10 fwd    Stability ball 1x10 left/right   Transfer training Sit to stand and supine to sit using log roll technique, 15 mins. Log roll technique supine to sit; Sit to stand from table and chair. Verbal review of log roll technique supine to sit and sit to stand from table/chair. Scap retract   Seated, x30   Hip abduction   Lateral step at bar, 2x10 TA focus   Hip extension   Next vist, retro step with TA contraction, hold if painful   Hip adduction   Seated, ball squeeze, [id number] hold TA focus   Sit to stand   X5 TA focus   Gastroc stretch   [id number]           Time spent with patient reviewing proper muscle recruitment and technique with exercises. Time spent reviewing MRI results with patient to ensure patient understanding.     MANUAL THERAPY: (0 minutes):   Joint mobilization and Soft tissue mobilization was utilized and necessary because of the patient's restricted joint motion, painful spasm, loss of articular motion, and restricted motion of soft tissue. --None today- patient unable to lay on table due to pain. MODALITIES: (10 minutes, no charge): *  Hot Pack Therapy in order to provide analgesia and relieve muscle spasm. HEP: As above; handouts given to patient for all exercises. Treatment/Session Summary:    Treatment Assessment:  Good tolerance to to progression of exercise program observed today; pt still unable to tolerate laying supine on table. Communication/Consultation:   HEP handout provided. Equipment provided today:  None  Recommendations/Intent for next treatment session: Next visit will focus on Pain/inflammation control, improve lumbar mobility, improve core strength, improve functional mobility. .    Total Treatment Billable Duration:  53 mins.   Time In: 1004  Time Out: Xiomara Landrum, PT       Charge Capture  }Post Session Pain  PT Visit [facility] Portal  MD [address]    Future Appointments   Date Time Provider Dylan Nascimento   10/7/2022 10:00 AM Edel Lunch, PTA [facility] [place]   10/12/2022 10:00 AM Jerie Grenada, PT [facility]   10/14/2022 10:00 AM Edel Lunch, PTA [facility]   10/19/2022 10:00 AM Jerie Grenada, PT [facility]   10/21/2022 10:00 AM Edel Lunch, PTA [facility]   10/26/2022 10:00 AM Jerie Grenada, PT [facility]   10/28/2022 10:00 AM Edel Lunch, PTA [facility]   11/2/2022 10:00 AM Edel Lunch, PTA [facility]   11/4/2022 10:00 AM Jerie Grenada, PT [facility]

## 2022-10-05 NOTE — PROGRESS NOTES
[address] [place] [address]  : 1965  Primary: [facility]  Secondary:  [facility] @ [address] [place] [id number]  Phone: [phone]  Fax: [phone] Plan Frequency: 2 visits per week for 8 weeks. Plan of Care/Certification Expiration Date: 22 (Start of [address] 2022)      PT Visit Info:    No data recorded    Visit Count:  3    OUTPATIENT PHYSICAL THERAPY:OP NOTE TYPE: Progress Report 10/5/2022               Episode  Appt Desk         Treatment Diagnosis:  Low back pain (M54.5)  Spinal stenosis of lumbar region, unspecified whether neurogenic claudication present (M48.061)  Low back pain with right sided sciatica (M54.41)  Other chronic pain (G89.29)  Medical/Referring Diagnosis:  Spinal stenosis, lumbar region without neurogenic claudication [M48.061]  Lumbago with sciatica, right side [M54.41]  Other chronic pain [G89.29]  Referring Physician:  CESILIA Alvarez CNP, MD Orders:  PT Eval and Treat   Return MD Appt:  2022  Date of Onset:  Onset Date: 22 (Chronic low back pain with current episode exacerbation approximately 4 weeks ago.)     Allergies:  Aspirin, Hydrocodone-acetaminophen, and Morphine  Restrictions/Precautions:    Restrictions/Precautions: Other (comment) (DM II)  Medications Last Reviewed:  10/5/2022         OBJECTIVE   Observation/Postural and Gait Assessment: Gait: Improve trunk control during level surface ambulation observed; continued decreased step length and gait speed; Posture: Mild to moderate rounded shoulder and forward head, partially correctable with verbal cues. Palpation: Moderate tenderness to palpation noted lower lumbar paraspinals, glutes.     AROM:   Lumbar extension: neutral°   Lumbar flexion: 30°   Lumbar left side bend: 15°   Lumbar right side bend: 12°       Strength:  Manual Muscle Test (out of 5) Left Right   Knee extension 4+ 4   Knee flexion 4- 4-   Hip flexion 4- 3+   Hip abduction NT NT   Ankle DF 4+ 4-   Ankle PF NT NT     Passive Accessory Motion: NT due to patient unable to lay prone. Special Tests:  Unable to tolerate due to high pain irritability. Neurological Screen:  Myotomes: Key muscle strength testing through bilateral LE is diminished L4/5 RLE. Dermatomes: Sensation testing through bilateral lower quadrants for light touch is intact. Reflexes: Patellar (L4) and Achilles (S1) are normal and diminished. Neural tension tests: Passive straight leg raise (SLR) test is NT. Crossed SLR test is NT. Slump test is NT. Femoral nerve stretch test is NT. Functional Mobility:  Sit to stand: Independent with BUE assist.  Supine to sit: Min A, uses log roll but requires verbal cues for correct performance. ASSESSMENT   Initial Assessment:  Mrs. Olvera is a 63 y/o female with complaints of low back pain with radiating BLE symptoms chronic in nature with insidious exacerbation of symptoms approximately 4 weeks ago. Pt reports symptoms seem to be worsening since exacerbation began. Pt has known lumbar spine stenosis and is scheduled for updated MRI imaging on 9/17/2022. Pt reports numbness/tingling/weakness into BLEs; pt denies  bowel/bladder dysfunction. Pt reports severe difficulty walking long distances and unable to sleep in bed due to inability to lay supine and/or side lying. Pt referred by MD for PT eval and treat. Pt will benefit from skilled PT treatment to address deficits in strength, AROM, balance, gait and functional mobility in order to return to prior level of function and improve quality of life. PROGRESS NOTE 10/5/2022: Pt has completed 3 PT treatment sessions from 9/8/2022 to 10/5/2022 with 4 cancelled visits. Pt demonstrate slow progression overall however does demonstrate improvements in lumbar AROM, functional mobility and gait since starting PT treatment.  Significant limitations remain especially regarding functional transfers as patient is still unable to lay supine for sleeping activities and unable to perform bed mobility activities without severe increased in low back pain. Pt will benefit from skilled PT treatment to address deficits in strength, AROM, balance, gait and functional mobility in order to return to prior level of function and improve quality of life. Problem List: (Impacting functional limitations): Body Structures, Functions, Activity Limitations Requiring Skilled Therapeutic Intervention: Decreased functional mobility ; Decreased ADL status; Decreased ROM; Decreased body mechanics; Decreased tolerance to work activity; Decreased strength; Decreased endurance; Decreased sensation; Decreased balance; Decreased high-level IADLs; Decreased coordination; Increased pain; Decreased posture     Therapy Prognosis:   Therapy Prognosis: Fair     Assessment Complexity:      PLAN   Effective Dates: 9/8/2022 TO Plan of Care/Certification Expiration Date: 11/03/22 (Start of POC 9/8/2022)     Frequency/Duration: Plan Frequency: 2 visits per week for 8 weeks. Interventions Planned (Treatment may consist of any combination of the following):    Current Treatment Recommendations: Strengthening; ROM; Balance training; Functional mobility training; Transfer training; ADL/Self-care training; Endurance training; Gait training; Stair training; Neuromuscular re-education; Manual Therapy - Soft Tissue Mobilization; Manual Therapy - Joint Manipulation; Pain management; Return to work related activity; Home exercise program; Safety education & training; Patient/Caregiver education & training; Equipment evaluation, education, & procurement; Modalities; Integrated dry needling;  Therapeutic activities     Goals: (Goals have been discussed and agreed upon with patient.)  Short-Term Functional Goals: Time Frame: 9/8/2022 to 10/6/2022  Patient demonstrates independence with home exercise program without verbal cueing provided by therapist. - Ongoing  Pt will report worst pain 3/10 or less at rest in order to return to unrestricted prolonged sitting 30 mins or greater. - Goal met  Pt will report unrestricted sleeping through night 75% of week in order to progress towards sleeping habits consistent with prior level of function. - ongoing  Pt will demonstrate independent supine to sit transfer using log roll technique in order to improve independence and safety with functional transfers. - ongoing  Discharge Goals: Time Frame: 9/8/2022 to 11/3/2022  Pt will report worst pain 3/10 or less with prolonged standing/walking 15 mins or greater in order to return to unrestricted community distance ambulation. - ongoing  Pt will report return to light house work activities with 3/10 pain or less. - ongoing  Pt will demonstrate gross BLE strength 4/5 or greater all planes in order to return to unrestricted childcare activities. - ongoing  ADELFO score of 15/50 or less/greater in order to demonstrate overall functional improvement. - NT 10/5/2022           Outcome Measure: Tool Used: Modified Oswestry Low Back Pain Questionnaire  Score:  Initial: 33/50  Most Recent: NT/50 (Date: 10/5/2022 )   Interpretation of Score: Each section is scored on a 0-5 scale, 5 representing the greatest disability. The scores of each section are added together for a total score of 50. Medical Necessity:   > Patient is expected to demonstrate progress in strength, range of motion, balance, coordination, and functional technique to increase independence with community distance ambulation, functional transfers, ADLs and light house work activities. > Skilled intervention continues to be required due to decreased AROM, decreased strength, decreased balance, decreased gait, decreased functional mobility. Reason For Services/Other Comments:  > Patient continues to require skilled intervention due to increasing complexity of exercise.   Total Duration:  Time In: 1004  Time Out: [address] [place], [place] [address]s therapy, I certify that the treatment plan above will be carried out by a therapist or under their direction. Thank you for this referral,  Olga Mccormick, PT     Referring Physician Signature: Elmo Chong* No Signature is Required for this note.         Post Session Pain  Charge Capture  PT Visit Info MD Guidelines  Fern

## 2022-10-07 ENCOUNTER — HOSPITAL ENCOUNTER (OUTPATIENT)
Dept: PHYSICAL THERAPY | Age: 57
Setting detail: RECURRING SERIES
Discharge: HOME | End: 2022-10-10
Payer: COMMERCIAL

## 2022-10-07 PROCEDURE — 97110 THERAPEUTIC EXERCISES: CPT

## 2022-10-07 ASSESSMENT — PAIN SCALES - GENERAL: PAINLEVEL_OUTOF10: 6

## 2022-10-07 NOTE — PROGRESS NOTES
[address]  : 1965  Primary: [facility]  Secondary:  [address] @ [address] [place] [address]  Phone: [phone]  Fax: [phone] Plan Frequency: 2 visits per week for 8 weeks. Plan of Care/Certification Expiration Date: 22 (Start of [address] 2022)      PT Visit Info:  No data recorded   Visit Count:  4   OUTPATIENT PHYSICAL THERAPY:OP NOTE TYPE: Treatment Note 10/7/2022       Episode  }Appt Desk             Treatment Diagnosis:  Low back pain (M54.5)  Spinal stenosis of lumbar region, unspecified whether neurogenic claudication present (M48.061)  Low back pain with right sided sciatica (M54.41)  Other chronic pain (G89.29)  Medical/Referring Diagnosis:  Spinal stenosis, lumbar region without neurogenic claudication [M48.061]  Lumbago with sciatica, right side [M54.41]  Other chronic pain [G89.29]  Referring Physician:  CESILIA Rudd CNP, MD Orders:  PT Eval and Treat   Date of Onset:  Onset Date: 22 (Chronic low back pain with current episode exacerbation approximately 4 weeks ago.)     Allergies:   Aspirin, Hydrocodone-acetaminophen, and Morphine  Restrictions/Precautions:  Restrictions/Precautions: Other (comment) (DM II)  No data recorded   Interventions Planned (Treatment may consist of any combination of the following):    Current Treatment Recommendations: Strengthening; ROM; Balance training; Functional mobility training; Transfer training; ADL/Self-care training; Endurance training; Gait training; Stair training; Neuromuscular re-education; Manual Therapy - Soft Tissue Mobilization; Manual Therapy - Joint Manipulation; Pain management; Return to work related activity; Home exercise program; Safety education & training; Patient/Caregiver education & training; Equipment evaluation, education, & procurement; Modalities; Integrated dry needling;  Therapeutic activities     Subjective Comments:  Patient reports continued back pain. Initial:}    6/10Post Session:        /10  Medications Last Reviewed:  10/7/2022  Updated Objective Findings:  None Today  Treatment   THERAPEUTIC EXERCISE: (53 minutes):    Exercises per grid below to improve mobility, strength, and coordination. Required moderate visual, verbal, manual, and tactile cues to promote proper body alignment, promote proper body poster and proper body mechanics. Progressed resistance, range, repetitions and complexity of movement as indicated. Date:  10/7/22 Date:  9/21/22 Date:  10/5/22   Activity/Exercise Parameters Parameters Parameters   Posterior pelvic tilt Seated 3 x 10 Attempted in supine however pt unable due to pain. Seated 2x10 Seated, 3x10   TA activation Multiple reps and with exercises Verbal review Seated, 2x10 5 sec   Seated lumbar flexion Stability ball 6 x 10 Stability ball, 3x10 fwd    Stability ball x10 left/right    Hands on knees; 2x10 Stability ball, 10x10 fwd    Stability ball 1x10 left/right   Transfer training Sit to stand and supine to sit using log roll technique, 5 mins. Log roll technique supine to sit; Sit to stand from table and chair. Verbal review of log roll technique supine to sit and sit to stand from table/chair. Scap retract   Seated, x30   Hip abduction Lateral step 3 x 10 at bar  Lateral step at bar, 2x10 TA focus   Hip extension Backward walking at bar 2 x 10  Next vist, retro step with TA contraction, hold if painful   Hip adduction Seated ball 3 x 10   Seated, ball squeeze, [id number] hold TA focus   Sit to stand 2 x 5   X5 TA focus   Gastroc stretch   [id number]           Time spent with patient reviewing proper muscle recruitment and technique with exercises. Time spent reviewing MRI results with patient to ensure patient understanding.     MANUAL THERAPY: (0 minutes):   Joint mobilization and Soft tissue mobilization was utilized and necessary because of the patient's restricted joint motion, painful spasm, loss of articular motion, and restricted motion of soft tissue. --None today- patient unable to lay on table due to pain. MODALITIES: (0 minutes, no charge): *  Hot Pack Therapy in order to provide analgesia and relieve muscle spasm. HEP: As above; handouts given to patient for all exercises. Treatment/Session Summary:    Treatment Assessment:  Tolerated additional exercises well  Communication/Consultation:   HEP handout provided. Equipment provided today:  None  Recommendations/Intent for next treatment session: Next visit will focus on Pain/inflammation control, improve lumbar mobility, improve core strength, improve functional mobility. .    Total Treatment Billable Duration:  53 mins.   Time In: 1000  Time Out: 1055    CLAY JANE PTA       Charge Capture  }Post Session Pain  PT Visit Info  [employer] Portal  MD Guidelines  Scanned Media  Benefits  MyChart    Future Appointments   Date Time Provider Dylan Nascimento   10/12/2022 10:00 AM Irvin Mealing, PT [facility]   10/14/2022 10:00 AM [place] Poot, PTA [facility]   10/19/2022 10:00 AM Irvin Mealing, PT [facility]   10/21/2022 10:00 AM Bal Poot, PTA [facility]   10/26/2022 10:00 AM Irvin Mealing, PT [facility]   10/28/2022 10:00 AM Bal Poot, PTA [facility]   11/2/2022 10:00 AM [place] Poot, PTA [facility]   11/4/2022 10:00 AM Irvin Mealing, PT [facility]

## 2022-10-12 ENCOUNTER — HOSPITAL ENCOUNTER (OUTPATIENT)
Dept: PHYSICAL THERAPY | Age: 57
Setting detail: RECURRING SERIES
Discharge: HOME | End: 2022-10-15
Payer: COMMERCIAL

## 2022-10-12 PROCEDURE — 97110 THERAPEUTIC EXERCISES: CPT

## 2022-10-12 ASSESSMENT — PAIN SCALES - GENERAL: PAINLEVEL_OUTOF10: 5

## 2022-10-12 NOTE — PROGRESS NOTES
[address]  : 1965  Primary: [facility]  Secondary:  [address] @ [address] [place] [address]  Phone: [phone]  Fax: [phone] Plan Frequency: 2 visits per week for 8 weeks. Plan of Care/Certification Expiration Date: 22 (Start of  [address] 2022)      PT Visit Info:  No data recorded   Visit Count:  5   OUTPATIENT PHYSICAL THERAPY:OP NOTE TYPE: Treatment Note 10/12/2022       Episode  }Appt Desk             Treatment Diagnosis:  Low back pain (M54.5)  Spinal stenosis of lumbar region, unspecified whether neurogenic claudication present (M48.061)  Low back pain with right sided sciatica (M54.41)  Other chronic pain (G89.29)  Medical/Referring Diagnosis:  Spinal stenosis, lumbar region without neurogenic claudication [M48.061]  Lumbago with sciatica, right side [M54.41]  Other chronic pain [G89.29]  Referring Physician:  CESILIA Spears CNP, MD Orders:  PT Eval and Treat   Date of Onset:  Onset Date: 22 (Chronic low back pain with current episode exacerbation approximately 4 weeks ago.)     Allergies:   Aspirin, Hydrocodone-acetaminophen, and Morphine  Restrictions/Precautions:  Restrictions/Precautions: Other (comment) (DM II)  No data recorded   Interventions Planned (Treatment may consist of any combination of the following):    Current Treatment Recommendations: Strengthening; ROM; Balance training; Functional mobility training; Transfer training; ADL/Self-care training; Endurance training; Gait training; Stair training; Neuromuscular re-education; Manual Therapy - Soft Tissue Mobilization; Manual Therapy - Joint Manipulation; Pain management; Return to work related activity; Home exercise program; Safety education & training; Patient/Caregiver education & training; Equipment evaluation, education, & procurement; Modalities; Integrated dry needling;  Therapeutic activities     Subjective Comments:  Pt reports \"felt good\" after last visit, onset of soreness the follow two days moderate per patient. Initial:}    5/10Post Session:        /10  Medications Last Reviewed:  10/12/2022  Updated Objective Findings:  None Today  Treatment   THERAPEUTIC EXERCISE: (53 minutes):    Exercises per grid below to improve mobility, strength, and coordination. Required moderate visual, verbal, manual, and tactile cues to promote proper body alignment, promote proper body poster and proper body mechanics. Progressed resistance, range, repetitions and complexity of movement as indicated. Date:  10/7/22 Date:  10/12/22 Date:  10/5/22   Activity/Exercise Parameters Parameters Parameters   Posterior pelvic tilt Seated 3 x 10 Seated 3x10 Seated, 3x10   TA activation Multiple reps and with exercises Verbal review Seated, 2x10 5 sec   Seated lumbar flexion Stability ball 6 x 10 Stability ball, 8x10 Stability ball, 10x10 fwd    Stability ball 1x10 left/right   Transfer training Sit to stand and supine to sit using log roll technique, 5 mins. Log roll technique supine to sit; Sit to stand from table and chair. Verbal review of log roll technique supine to sit and sit to stand from table/chair. Scap retract  Seated, x30 Seated, x30   Hip abduction Lateral step 3 x 10 at bar Lateral step at bar, 2x10 TA focus Lateral step at bar, 2x10 TA focus   Hip extension Backward walking at bar 2 x 10 Retro step, x10 each with TA focus Next vist, retro step with TA contraction, hold if painful   Hip adduction Seated ball 3 x 10  Seated, ball squeeze, [id number] hold TA focus Seated, ball squeeze, [id number] hold TA focus   Seated hip abduction  Seated, Red TB, x20    Sit to stand 2 x 5  2x5 with TA focus X5 TA focus   Gastroc stretch  Slantboard, [id number]           Time spent with patient reviewing proper muscle recruitment and technique with exercises.  Time spent reviewing MRI results with patient to ensure patient understanding. MANUAL THERAPY: (0 minutes):   Joint mobilization and Soft tissue mobilization was utilized and necessary because of the patient's restricted joint motion, painful spasm, loss of articular motion, and restricted motion of soft tissue. --None today- patient unable to lay on table due to pain. MODALITIES: (5 minutes, no charge): *  Hot Pack Therapy in order to provide analgesia and relieve muscle spasm. HEP: As above; handouts given to patient for all exercises. Treatment/Session Summary:    Treatment Assessment:  Improved tolerance to standing activities observed today. Communication/Consultation:   HEP handout provided. Equipment provided today:  Red TB  Recommendations/Intent for next treatment session: Next visit will focus on Pain/inflammation control, improve lumbar mobility, improve core strength, improve functional mobility. .    Total Treatment Billable Duration:  53 mins.   Time In: 1008  Time Out: 1107    Lakeshia Rodriguez, PT       Charge Capture  }Post Session Pain  PT Visit Info  [employer] Portal  MD Guidelines  Scanned Media  Benefits  MyChart    Future Appointments   Date Time Provider Dylan Nascimento   10/14/2022 10:00 AM Sugar Souza PTA [facility]   10/19/2022 10:00 AM Lakeshia Rodriguez, PT [facility]   10/21/2022 10:00 AM Sugar Souza, DEONTE [facility]   10/26/2022 10:00 AM Lakeshia Rodriguez, PT [facility]   10/28/2022 10:00 AM Sugar Souza, DEONTE [facility]   11/2/2022 10:00 AM Sugar Souza PTA [facility]   11/4/2022 10:00 AM Lakeshia Rodriguez, PT [facility]

## 2022-10-14 ENCOUNTER — HOSPITAL ENCOUNTER (OUTPATIENT)
Dept: PHYSICAL THERAPY | Age: 57
Setting detail: RECURRING SERIES
Discharge: HOME | End: 2022-10-17
Payer: COMMERCIAL

## 2022-10-14 PROCEDURE — 97110 THERAPEUTIC EXERCISES: CPT

## 2022-10-14 ASSESSMENT — PAIN SCALES - GENERAL: PAINLEVEL_OUTOF10: 5

## 2022-10-14 NOTE — PROGRESS NOTES
[address]  : 1965  Primary: [facility]  Secondary:  [address] @ [address] [place] [address]  Phone: [phone]  Fax: [phone] Plan Frequency: 2 visits per week for 8 weeks. Plan of Care/Certification Expiration Date: 22 (Start of [address] 2022)      PT Visit Info:  No data recorded   Visit Count:  6   OUTPATIENT PHYSICAL THERAPY:OP NOTE TYPE: Treatment Note 10/14/2022       Episode  }Appt Desk             Treatment Diagnosis:  Low back pain (M54.5)  Spinal stenosis of lumbar region, unspecified whether neurogenic claudication present (M48.061)  Low back pain with right sided sciatica (M54.41)  Other chronic pain (G89.29)  Medical/Referring Diagnosis:  Spinal stenosis, lumbar region without neurogenic claudication [M48.061]  Lumbago with sciatica, right side [M54.41]  Other chronic pain [G89.29]  Referring Physician:  CESILIA Vásquez CNP, MD Orders:  PT Eval and Treat   Date of Onset:  Onset Date: 22 (Chronic low back pain with current episode exacerbation approximately 4 weeks ago.)     Allergies:   Aspirin, Hydrocodone-acetaminophen, and Morphine  Restrictions/Precautions:  Restrictions/Precautions: Other (comment) (DM II)  No data recorded   Interventions Planned (Treatment may consist of any combination of the following):    Current Treatment Recommendations: Strengthening; ROM; Balance training; Functional mobility training; Transfer training; ADL/Self-care training; Endurance training; Gait training; Stair training; Neuromuscular re-education; Manual Therapy - Soft Tissue Mobilization; Manual Therapy - Joint Manipulation; Pain management; Return to work related activity; Home exercise program; Safety education & training; Patient/Caregiver education & training; Equipment evaluation, education, & procurement; Modalities; Integrated dry needling;  Therapeutic activities     Subjective Comments:  Patient reports some improvement since starting therapy  Initial:}    5/10Post Session:        /10  Medications Last Reviewed:  10/14/2022  Updated Objective Findings:  None Today  Treatment   THERAPEUTIC EXERCISE: (53 minutes):    Exercises per grid below to improve mobility, strength, and coordination. Required moderate visual, verbal, manual, and tactile cues to promote proper body alignment, promote proper body poster and proper body mechanics. Progressed resistance, range, repetitions and complexity of movement as indicated. Date:  10/7/22 Date:  10/12/22 Date:  10/14/22   Activity/Exercise Parameters Parameters Parameters   Posterior pelvic tilt Seated 3 x 10 Seated 3x10 Seated, 3x10   TA activation Multiple reps and with exercises Verbal review Seated, 2x10 5 sec   Seated lumbar flexion Stability ball 6 x 10 Stability ball, 8x10 Stability ball, 5 x 60 sec    Stability ball 1x10 left/right   Transfer training Sit to stand and supine to sit using log roll technique, 5 mins. Log roll technique supine to sit; Sit to stand from table and chair. Scap retract  Seated, x30 Seated, x30   Hip abduction Lateral step 3 x 10 at bar Lateral step at bar, 2x10 TA focus Lateral step 3 x 25 ft   Hip extension Backward walking at bar 2 x 10 Retro step, x10 each with TA focus Retro stepping 3 x 25 ft   Hip adduction Seated ball 3 x 10  Seated, ball squeeze, [id number] hold TA focus Seated, ball squeeze, [id number] hold TA focus   Seated hip abduction  Seated, Red TB, x20    Sit to stand 2 x 5  2x5 with TA focus 3X5 TA focus   Gastroc stretch  Slantboard, [id number] Slantboard [id number]           Time spent with patient reviewing proper muscle recruitment and technique with exercises. Time spent reviewing MRI results with patient to ensure patient understanding.     MANUAL THERAPY: (0 minutes):   Joint mobilization and Soft tissue mobilization was utilized and necessary because of the patient's restricted joint motion, painful spasm, loss of articular motion, and restricted motion of soft tissue. --None today- patient unable to lay on table due to pain. MODALITIES: (0 minutes, no charge): *  Hot Pack Therapy in order to provide analgesia and relieve muscle spasm. HEP: As above; handouts given to patient for all exercises. Treatment/Session Summary:    Treatment Assessment:  Tolerated session fair. Intermittent pain with some exercises  Communication/Consultation:   HEP handout provided. Equipment provided today:  Red TB  Recommendations/Intent for next treatment session: Next visit will focus on Pain/inflammation control, improve lumbar mobility, improve core strength, improve functional mobility. .    Total Treatment Billable Duration:  53 mins.   Time In: 1000  Time Out: 1055    CLAY JANE PTA       Charge Capture  }Post Session Pain  PT Visit Info  [employer] Portal  MD Guidelines  Scanned Media  Benefits  MyChart    Future Appointments   Date Time Provider Dylan Nascimento   10/19/2022 10:00 AM Beula Shock, PT [facility]   10/21/2022 10:00 AM Veronica Harmagdalena, PTA [facility]   10/26/2022 10:00 AM Beula Shock, PT [facility]   10/28/2022 10:00 AM Veronica Harps, PTA [facility]   11/2/2022 10:00 AM Veronicajuliocesar White, PTA [facility]   11/4/2022 10:00 AM Beula Shock, PT [facility]

## 2022-10-19 ENCOUNTER — HOSPITAL ENCOUNTER (OUTPATIENT)
Dept: PHYSICAL THERAPY | Age: 57
Setting detail: RECURRING SERIES
End: 2022-10-19
Payer: COMMERCIAL

## 2022-10-21 ENCOUNTER — HOSPITAL ENCOUNTER (OUTPATIENT)
Dept: PHYSICAL THERAPY | Age: 57
Setting detail: RECURRING SERIES
End: 2022-10-21
Payer: COMMERCIAL

## 2022-10-26 ENCOUNTER — HOSPITAL ENCOUNTER (OUTPATIENT)
Dept: PHYSICAL THERAPY | Age: 57
Setting detail: RECURRING SERIES
Discharge: HOME | End: 2022-10-29
Payer: COMMERCIAL

## 2022-10-26 PROCEDURE — 97110 THERAPEUTIC EXERCISES: CPT

## 2022-10-26 ASSESSMENT — PAIN SCALES - GENERAL: PAINLEVEL_OUTOF10: 8

## 2022-10-26 NOTE — PROGRESS NOTES
[address]  : 1965  Primary: [facility]  Secondary:  [address] @ [address] [facility] [address]  Phone: [phone]  Fax: [phone] Plan Frequency: 2 visits per week for 8 weeks. Plan of Care/Certification Expiration Date: 22 (Start of [address] 2022)      PT Visit Info:  No data recorded   Visit Count:  7   OUTPATIENT PHYSICAL THERAPY:OP NOTE TYPE: Treatment Note 10/26/2022       Episode  }Appt Desk             Treatment Diagnosis:  Low back pain (M54.5)  Spinal stenosis of lumbar region, unspecified whether neurogenic claudication present (M48.061)  Low back pain with right sided sciatica (M54.41)  Other chronic pain (G89.29)  Medical/Referring Diagnosis:  Spinal stenosis, lumbar region without neurogenic claudication [M48.061]  Lumbago with sciatica, right side [M54.41]  Other chronic pain [G89.29]  Referring Physician:  CESILIA Palacios CNP, MD Orders:  PT Eval and Treat   Date of Onset:  Onset Date: 22 (Chronic low back pain with current episode exacerbation approximately 4 weeks ago.)     Allergies:   Aspirin, Hydrocodone-acetaminophen, and Morphine  Restrictions/Precautions:  Restrictions/Precautions: Other (comment) (DM II)  No data recorded   Interventions Planned (Treatment may consist of any combination of the following):    Current Treatment Recommendations: Strengthening; ROM; Balance training; Functional mobility training; Transfer training; ADL/Self-care training; Endurance training; Gait training; Stair training; Neuromuscular re-education; Manual Therapy - Soft Tissue Mobilization; Manual Therapy - Joint Manipulation; Pain management; Return to work related activity; Home exercise program; Safety education & training; Patient/Caregiver education & training; Equipment evaluation, education, & procurement; Modalities; Integrated dry needling;  Therapeutic activities     Subjective Comments:  Pt reports increased soreness today per pt pontentially due to weather and recent illness. Initial:}    8/10Post Session:       6/10  Medications Last Reviewed:  10/26/2022  Updated Objective Findings:  None Today  Treatment   THERAPEUTIC EXERCISE: (44 minutes):    Exercises per grid below to improve mobility, strength, and coordination. Required moderate visual, verbal, manual, and tactile cues to promote proper body alignment, promote proper body poster and proper body mechanics. Progressed resistance, range, repetitions and complexity of movement as indicated. Date:  10/26/22 Date:  10/12/22 Date:  10/14/22   Activity/Exercise Parameters Parameters Parameters   Posterior pelvic tilt Seated 3 x 10 Seated 3x10 Seated, 3x10   TA activation Seated, x10 5 sec Verbal review Seated, 2x10 5 sec   Seated lumbar flexion Stability ball 12 x 10 Stability ball, 8x10 Stability ball, 5 x 60 sec    Stability ball 1x10 left/right   Transfer training -- --    Scap retract Seated x30 Seated, x30 Seated, x30   Hip abduction -- Lateral step at bar, 2x10 TA focus Lateral step 3 x 25 ft   Hip extension -- Retro step, x10 each with TA focus Retro stepping 3 x 25 ft   Hip adduction Seated ball 3 x 20 Seated, ball squeeze, [id number] hold TA focus Seated, ball squeeze, [id number] hold TA focus   Seated hip abduction Seated, Red TB, x20 Seated, Red TB, x20    Sit to stand 2 x 5 TA focus 2x5 with TA focus 3X5 TA focus   Gastroc stretch Slantboard, [id number] Slantboard, [id number] Slantboard [id number]   Sciatic nerve glide Seated, x10 each       Time spent with patient reviewing proper muscle recruitment and technique with exercises. Time spent reviewing MRI results with patient to ensure patient understanding.     MANUAL THERAPY: (0 minutes):   Joint mobilization and Soft tissue mobilization was utilized and necessary because of the patient's restricted joint motion, painful spasm, loss of articular motion, and restricted motion of soft tissue. --None today- patient unable to lay on table due to pain. MODALITIES: (10 minutes, no charge): *  Hot Pack Therapy in order to provide analgesia and relieve muscle spasm. HEP: As above; handouts given to patient for all exercises. Treatment/Session Summary:    Treatment Assessment:  Decreased exercise program today due to pt reported increase in overall pain levels at start of visit. Pt tolernace to exercise today less than at previous visits however patient able to complete all seated exercises with added sciatic nerve glide to address observed neural tension. Communication/Consultation:   HEP handout provided. Equipment provided today:  None today. Recommendations/Intent for next treatment session: Next visit will focus on Pain/inflammation control, improve lumbar mobility, improve core strength, improve functional mobility. .    Total Treatment Billable Duration:  44 mins.   Time In: 1005  Time Out: [address], PT       Charge Capture  }Post Session Pain  PT Visit Info  [facility] Portal  MD Guidelines  Scanned Media  Benefits  MyChart    Future Appointments   Date Time Provider Dylan Nascimento   10/28/2022 10:00 AM Jean Gilliland PTA [facility]   11/2/2022 10:00 AM Jean Gilliland PTA [facility]   11/4/2022 10:00 AM Gurwinder Stevens, PT [facility]

## 2022-10-28 ENCOUNTER — HOSPITAL ENCOUNTER (OUTPATIENT)
Dept: PHYSICAL THERAPY | Age: 57
Setting detail: RECURRING SERIES
Discharge: HOME | End: 2022-10-31
Payer: COMMERCIAL

## 2022-10-28 PROCEDURE — 97140 MANUAL THERAPY 1/> REGIONS: CPT

## 2022-10-28 PROCEDURE — 97110 THERAPEUTIC EXERCISES: CPT

## 2022-10-28 ASSESSMENT — PAIN SCALES - GENERAL: PAINLEVEL_OUTOF10: 8

## 2022-10-28 NOTE — PROGRESS NOTES
[address]  : 1965  Primary: [facility]  Secondary:  [address] @ [address] [place] [address]  Phone: [phone]  Fax: [phone] Plan Frequency: 2 visits per week for 8 weeks. Plan of Care/Certification Expiration Date: 22 (Start of  [address] 2022)      PT Visit Info:  No data recorded   Visit Count:  8   OUTPATIENT PHYSICAL THERAPY:OP NOTE TYPE: Treatment Note 10/28/2022       Episode  }Appt Desk             Treatment Diagnosis:  Low back pain (M54.5)  Spinal stenosis of lumbar region, unspecified whether neurogenic claudication present (M48.061)  Low back pain with right sided sciatica (M54.41)  Other chronic pain (G89.29)  Medical/Referring Diagnosis:  Spinal stenosis, lumbar region without neurogenic claudication [M48.061]  Lumbago with sciatica, right side [M54.41]  Other chronic pain [G89.29]  Referring Physician:  CESILIA Leon CNP, MD Orders:  PT Eval and Treat   Date of Onset:  Onset Date: 22 (Chronic low back pain with current episode exacerbation approximately 4 weeks ago.)     Allergies:   Aspirin, Hydrocodone-acetaminophen, and Morphine  Restrictions/Precautions:  Restrictions/Precautions: Other (comment) (DM II)  No data recorded   Interventions Planned (Treatment may consist of any combination of the following):    Current Treatment Recommendations: Strengthening; ROM; Balance training; Functional mobility training; Transfer training; ADL/Self-care training; Endurance training; Gait training; Stair training; Neuromuscular re-education; Manual Therapy - Soft Tissue Mobilization; Manual Therapy - Joint Manipulation; Pain management; Return to work related activity; Home exercise program; Safety education & training; Patient/Caregiver education & training; Equipment evaluation, education, & procurement; Modalities; Integrated dry needling;  Therapeutic activities     Subjective Comments:  Patient reports significant back pain today and severe pain last night  Initial:}    8/10Post Session:       6/10  Medications Last Reviewed:  10/28/2022  Updated Objective Findings:  None Today  Treatment   THERAPEUTIC EXERCISE: (44 minutes):    Exercises per grid below to improve mobility, strength, and coordination. Required moderate visual, verbal, manual, and tactile cues to promote proper body alignment, promote proper body poster and proper body mechanics. Progressed resistance, range, repetitions and complexity of movement as indicated. Date:  10/26/22 Date:  10/28/22 Date:  10/14/22   Activity/Exercise Parameters Parameters Parameters   Posterior pelvic tilt Seated 3 x 10 Seated 3x10 pain free range Seated, 3x10   TA activation Seated, x10 5 sec Seated 10 x 5 sec Seated, 2x10 5 sec   Seated lumbar flexion Stability ball 12 x 10 Stability ball, 10x10 Stability ball, 5 x 60 sec    Stability ball 1x10 left/right   Transfer training -- --    Scap retract Seated x30 Seated, x30 Seated, x30   Hip abduction --  Lateral step 3 x 25 ft   Hip extension --  Retro stepping 3 x 25 ft   Hip adduction Seated ball 3 x 20 Seated x 30 Seated, ball squeeze, [id number] hold TA focus   Seated hip abduction Seated, Red TB, x20 Seated, green TB, x30    Sit to stand 2 x 5 TA focus 3x5 with TA focus 3X5 TA focus   Gastroc stretch Slantboard, [id number] Slantboard, [id number] Slantboard [id number]   Sciatic nerve glide Seated, x10 each       Time spent with patient reviewing proper muscle recruitment and technique with exercises. Time spent reviewing MRI results with patient to ensure patient understanding. MANUAL THERAPY: (0 minutes):   Joint mobilization and Soft tissue mobilization was utilized and necessary because of the patient's restricted joint motion, painful spasm, loss of articular motion, and restricted motion of soft tissue. --None today- patient unable to lay on table due to pain.     MODALITIES: (10 minutes, no charge): *  Hot Pack Therapy in order to provide analgesia and relieve muscle spasm. HEP: As above; handouts given to patient for all exercises. Treatment/Session Summary:    Treatment Assessment:  Improved mobility after exercises  Communication/Consultation:   HEP handout provided. Equipment provided today:  None today. Recommendations/Intent for next treatment session: Next visit will focus on Pain/inflammation control, improve lumbar mobility, improve core strength, improve functional mobility. .    Total Treatment Billable Duration:  44 mins.   Time In: 0957  Time Out: [facility]       Charge Capture  }Post Session Pain  PT Visit Info  [employer] Portal  MD Guidelines  Scanned Media  Benefits  MyChart    Future Appointments   Date Time Provider Dylan Nascimento   11/2/2022 10:00 AM Racquel Durán PTA [facility]   11/4/2022 10:00 AM Sharla Martin PT [facility]

## 2022-11-02 ENCOUNTER — HOSPITAL ENCOUNTER (OUTPATIENT)
Dept: PHYSICAL THERAPY | Age: 57
Setting detail: RECURRING SERIES
Discharge: HOME | End: 2022-11-05
Payer: COMMERCIAL

## 2022-11-02 PROCEDURE — 97110 THERAPEUTIC EXERCISES: CPT

## 2022-11-02 ASSESSMENT — PAIN SCALES - GENERAL: PAINLEVEL_OUTOF10: 4

## 2022-11-02 NOTE — PROGRESS NOTES
[address]  : 1965  Primary: [facility]  Secondary:  Danita Soto @ [address] [facility] [address]  Phone: [phone]  Fax: [phone] Plan Frequency: 2 visits per week for 8 weeks. Plan of Care/Certification Expiration Date: 22 (Start of  [address] 2022)      PT Visit Info:  No data recorded   Visit Count:  9   OUTPATIENT PHYSICAL THERAPY:OP NOTE TYPE: Treatment Note 2022       Episode  }Appt Desk             Treatment Diagnosis:  Low back pain (M54.5)  Spinal stenosis of lumbar region, unspecified whether neurogenic claudication present (M48.061)  Low back pain with right sided sciatica (M54.41)  Other chronic pain (G89.29)  Medical/Referring Diagnosis:  Spinal stenosis, lumbar region without neurogenic claudication [M48.061]  Lumbago with sciatica, right side [M54.41]  Other chronic pain [G89.29]  Referring Physician:  CESILIA Farrell CNP, MD Orders:  PT Eval and Treat   Date of Onset:  Onset Date: 22 (Chronic low back pain with current episode exacerbation approximately 4 weeks ago.)     Allergies:   Aspirin, Hydrocodone-acetaminophen, and Morphine  Restrictions/Precautions:  Restrictions/Precautions: Other (comment) (DM II)  No data recorded   Interventions Planned (Treatment may consist of any combination of the following):    Current Treatment Recommendations: Strengthening; ROM; Balance training; Functional mobility training; Transfer training; ADL/Self-care training; Endurance training; Gait training; Stair training; Neuromuscular re-education; Manual Therapy - Soft Tissue Mobilization; Manual Therapy - Joint Manipulation; Pain management; Return to work related activity; Home exercise program; Safety education & training; Patient/Caregiver education & training; Equipment evaluation, education, & procurement; Modalities; Integrated dry needling;  Therapeutic activities     Subjective Comments:  Patient reports feeling better today. Initial:}    4/10Post Session:       3/10  Medications Last Reviewed:  11/2/2022  Updated Objective Findings:   Improved tolerance for exercise and overall improved mobility  Treatment   THERAPEUTIC EXERCISE: (53 minutes):    Exercises per grid below to improve mobility, strength, and coordination. Required moderate visual, verbal, manual, and tactile cues to promote proper body alignment, promote proper body poster and proper body mechanics. Progressed resistance, range, repetitions and complexity of movement as indicated. Date:  10/26/22 Date:  10/28/22 Date:  11/2/22   Activity/Exercise Parameters Parameters Parameters   Posterior pelvic tilt Seated 3 x 10 Seated 3x10 pain free range Seated, 3x15 with heat   TA activation Seated, x10 5 sec Seated 10 x 5 sec Seated, 3x10 5 sec with heat   Seated lumbar flexion Stability ball 12 x 10 Stability ball, 10x10 Stability ball, 3 x 15    Stability ball 1x10 left/right   Transfer training -- --    Scap retract Seated x30 Seated, x30    Hip abduction --  Lateral step 3 x 25 ft   Hip extension --  Retro stepping 3 x 25 ft   Hip adduction Seated ball 3 x 20 Seated x 30    Seated hip abduction Seated, Red TB, x20 Seated, green TB, x30 Blue 3 x 15   Sit to stand 2 x 5 TA focus 3x5 with TA focus 3X5 with  blue ball TA focus   Gastroc stretch Slantboard, [id number] Slantboard, [id number] Slantboard [id number]   Sciatic nerve glide Seated, x10 each  2 x 10 B     Time spent with patient reviewing proper muscle recruitment and technique with exercises. Time spent reviewing MRI results with patient to ensure patient understanding. MANUAL THERAPY: (0 minutes):   Joint mobilization and Soft tissue mobilization was utilized and necessary because of the patient's restricted joint motion, painful spasm, loss of articular motion, and restricted motion of soft tissue. --None today- patient unable to lay on table due to pain.     MODALITIES: (10 minutes, no charge): *  Hot Pack Therapy in order to provide analgesia and relieve muscle spasm. HEP: As above; handouts given to patient for all exercises. Treatment/Session Summary:    Treatment Assessment:  No complaints with exercises  Communication/Consultation:   HEP progression and body mechanics  Equipment provided today:  None today. Recommendations/Intent for next treatment session: Next visit will focus on Pain/inflammation control, improve lumbar mobility, improve core strength, improve functional mobility. .    Total Treatment Billable Duration:  53 mins.   Time In: 1000  Time Out: 1059    CLAY JANE PTA       Charge Capture  }Post Session Pain  PT Visit Info  [employer] Portal  MD Guidelines  Scanned Media  Benefits  MyChart    Future Appointments   Date Time Provider Dylan Nascimento   11/4/2022 10:00 AM Adry Snow, PT [facility]

## 2022-11-04 ENCOUNTER — HOSPITAL ENCOUNTER (OUTPATIENT)
Dept: PHYSICAL THERAPY | Age: 57
Setting detail: RECURRING SERIES
Discharge: HOME | End: 2022-11-07
Payer: COMMERCIAL

## 2022-11-04 PROCEDURE — 97110 THERAPEUTIC EXERCISES: CPT

## 2022-11-04 ASSESSMENT — PAIN SCALES - GENERAL: PAINLEVEL_OUTOF10: 7

## 2022-11-04 NOTE — PROGRESS NOTES
[address]  : 1965  Primary: [address] [facility]  Secondary:  Jean-Paul Hightower @ [address] [place] [address]  Phone: [phone]  Fax: [phone] Plan Frequency: 1-2 visits per week for 8 weeks  Plan of Care/Certification Expiration Date: 22 (Start of [address] 2022)      PT Visit Info:  No data recorded   Visit Count:  10   OUTPATIENT PHYSICAL THERAPY:OP NOTE TYPE: Treatment Note 2022       Episode  }Appt Desk             Treatment Diagnosis:  Low back pain (M54.5)  Spinal stenosis of lumbar region, unspecified whether neurogenic claudication present (M48.061)  Low back pain with right sided sciatica (M54.41)  Other chronic pain (G89.29)  Medical/Referring Diagnosis:  Spinal stenosis, lumbar region without neurogenic claudication [M48.061]  Lumbago with sciatica, right side [M54.41]  Other chronic pain [G89.29]  Referring Physician:  CESILIA Lorenz CNP, MD Orders:  PT Eval and Treat   Date of Onset:  Onset Date: 22 (Chronic low back pain with current episode exacerbation approximately 4 weeks ago.)     Allergies:   Aspirin, Hydrocodone-acetaminophen, and Morphine  Restrictions/Precautions:  Restrictions/Precautions: Other (comment) (DM II)  No data recorded   Interventions Planned (Treatment may consist of any combination of the following):    Current Treatment Recommendations: Strengthening; ROM; Balance training; Functional mobility training; Transfer training; ADL/Self-care training; Endurance training; Gait training; Stair training; Neuromuscular re-education; Manual Therapy - Soft Tissue Mobilization; Manual Therapy - Joint Manipulation; Pain management; Return to work related activity; Home exercise program; Safety education & training; Patient/Caregiver education & training; Equipment evaluation, education, & procurement; Modalities; Integrated dry needling;  Therapeutic activities Subjective Comments:  Pt reports moderate pain at start of treatment; pt reports notices improved standing and walking tolerance following PT visits. Initial:}    7/10Post Session:       3/10  Medications Last Reviewed:  11/4/2022  Updated Objective Findings:   See recertification note dated 11/4/2022. Treatment   THERAPEUTIC EXERCISE: (53 minutes):    Exercises per grid below to improve mobility, strength, and coordination. Required moderate visual, verbal, manual, and tactile cues to promote proper body alignment, promote proper body poster and proper body mechanics. Progressed resistance, range, repetitions and complexity of movement as indicated. Date:  11/4/22 Date:  10/28/22 Date:  11/2/22   Activity/Exercise Parameters Parameters Parameters   Posterior pelvic tilt Seated 3 x 10 Seated 3x10 pain free range Seated, 3x15 with heat   TA activation Seated, 3x10 5 sec Seated 10 x 5 sec Seated, 3x10 5 sec with heat   Seated lumbar flexion Stability ball 10 x 10 Stability ball, 10x10 Stability ball, 3 x 15    Stability ball 1x10 left/right   Transfer training -- --    Scap retract Seated x30 Seated, x30    Hip abduction Lateral lunge, x10  Lateral step 3 x 25 ft   Hip extension Retro step, x10  Retro stepping 3 x 25 ft   Hip adduction Seated ball 3 x 20 Seated x 30    Seated hip abduction Seated, Blue, 2x10 Seated, green TB, x30 Blue 3 x 15   Sit to stand 2 x 5 TA focus 3x5 with TA focus 3X5 with  blue ball TA focus   Gastroc stretch Slantboard, [id number] Slantboard, [id number] Slantboard [id number]   Sciatic nerve glide Seated, x10 each  2 x 10 B     Time spent with patient reviewing proper muscle recruitment and technique with exercises. Time spent reviewing MRI results with patient to ensure patient understanding.     MANUAL THERAPY: (0 minutes):   Joint mobilization and Soft tissue mobilization was utilized and necessary because of the patient's restricted joint motion, painful spasm, loss of articular motion, and restricted motion of soft tissue. --None today- patient unable to lay on table due to pain. MODALITIES: (5 minutes, no charge): *  Hot Pack Therapy in order to provide analgesia and relieve muscle spasm. HEP: As above; handouts given to patient for all exercises. Treatment/Session Summary:    Treatment Assessment:  Pt tolerated session well. Verbal cues to ensure good posture and TA activation during seated and standing exercise. Communication/Consultation:   None today. Equipment provided today:  None today. Recommendations/Intent for next treatment session: Next visit will focus on Pain/inflammation control, improve lumbar mobility, improve core strength, improve functional mobility. Total Treatment Billable Duration:  53 mins.   Time In: 1000  Time Out: 1101    Zacarias Amador, NANCI       Charge Capture  }Post Session Pain  PT Visit Info  [employer] Portal  MD Guidelines  Scanned Media  Benefits  Knowledge [employer]    Future Appointments   Date Time Provider Dylan Nascimento   11/9/2022 10:00 AM Zacarias Amador, PT [facility]   11/21/2022 10:00 AM Zacarias Amador, PT [facility]   11/30/2022 10:00 AM Zacarias Amador, PT [facility]   12/2/2022 11:00 AM Mik Hilliard, PTA [facility]   12/7/2022 10:00 AM Mik Hilliard, PTA [facility]   12/9/2022 10:00 AM Zacarias Amador, PT [facility]   12/14/2022 10:00 AM Mik Hilliard, PTA [facility]   12/16/2022 10:00 AM Zacarias Amador, PT [facility]   12/19/2022 11:00 AM Zacarias Amador, PT [facility]   12/21/2022 10:00 AM Zacarias Amador, PT [facility]   12/28/2022 10:00 AM Zacarias Amador, PT [facility]

## 2022-11-04 NOTE — THERAPY RECERTIFICATION
[address]  : 1965  Primary: [address] [facility]  Secondary:  [address] @ [address] [place] [address]  Phone: [phone]  Fax: [phone] Plan Frequency: 1-2 visits per week for 8 weeks  Plan of Care/Certification Expiration Date: 22 (Start of  [address] 2022)      PT Visit Info:    No data recorded    Visit Count:  10    OUTPATIENT PHYSICAL THERAPY:OP NOTE TYPE: Recertification                Episode  Appt Desk         Treatment Diagnosis:  Low back pain (M54.5)  Spinal stenosis of lumbar region, unspecified whether neurogenic claudication present (M48.061)  Low back pain with right sided sciatica (M54.41)  Other chronic pain (G89.29)  Medical/Referring Diagnosis:  Spinal stenosis, lumbar region without neurogenic claudication [M48.061]  Lumbago with sciatica, right side [M54.41]  Other chronic pain [G89.29]  Referring Physician:  CESILIA Sequeira - CNP  MD Orders:  PT Eval and Treat   Return MD Appt:  2022  Date of Onset:  Onset Date: 22 (Chronic low back pain with current episode exacerbation approximately 4 weeks ago.)     Allergies:  Aspirin, Hydrocodone-acetaminophen, and Morphine  Restrictions/Precautions:    Restrictions/Precautions: Other (comment) (DM II)  Medications Last Reviewed:  2022         OBJECTIVE   Observation/Postural and Gait Assessment: Gait: Significant improve gait speed, continued decreased trunk rotation; Posture: Mild rounded shoulder and forward head, partially correctable with verbal cues. Palpation: Mild to moderate tenderness to palpation noted lower lumbar paraspinals, glutes.     AROM:   Lumbar extension: 5°   Lumbar flexion: 38°   Lumbar left side bend: 15°   Lumbar right side bend: 12°       Strength:  Manual Muscle Test (out of 5) Left Right   Knee extension 4+ 4+   Knee flexion 4- 4-   Hip flexion 4- 4-   Hip abduction NT NT   Ankle DF 4+ 4- Ankle PF NT NT     Passive Accessory Motion: NT due to patient unable to lay prone. Special Tests:  Unable to tolerate due to high pain irritability. Neurological Screen:  Myotomes: Key muscle strength testing through bilateral LE is diminished L4/5 RLE. Dermatomes: Sensation testing through bilateral lower quadrants for light touch is intact. Reflexes: Patellar (L4) and Achilles (S1) are normal and diminished. Neural tension tests: Passive straight leg raise (SLR) test is NT. Crossed SLR test is NT. Slump test is NT. Femoral nerve stretch test is NT. Functional Mobility:  Sit to stand: Independent with BUE assist.  Supine to sit: Min A, uses log roll but requires verbal cues for correct performance. ASSESSMENT   Initial Assessment:  Mrs. Olvera is a 63 y/o female with complaints of low back pain with radiating BLE symptoms chronic in nature with insidious exacerbation of symptoms approximately 4 weeks ago. Pt reports symptoms seem to be worsening since exacerbation began. Pt has known lumbar spine stenosis and is scheduled for updated MRI imaging on 9/17/2022. Pt reports numbness/tingling/weakness into BLEs; pt denies  bowel/bladder dysfunction. Pt reports severe difficulty walking long distances and unable to sleep in bed due to inability to lay supine and/or side lying. Pt referred by MD for PT eval and treat. Pt will benefit from skilled PT treatment to address deficits in strength, AROM, balance, gait and functional mobility in order to return to prior level of function and improve quality of life. RECERTIFICATION NOTE 38/2/4501: Pt has completed 10 PT treatment sessions from 9/8/2022 to 11/4/2022 with 4 cancelled visits. Pt reports improve standing and walking tolerance following PT treatment sessions; pt demonstrate improved lumbar AROM and BLE strength. Limitations remain regarding standing and walking tolerance well as sleeping tolerance/ability and ADL performance.  Pt will benefit from skilled PT treatment to address deficits in strength, AROM, balance, gait and functional mobility in order to return to prior level of function and improve quality of life. Problem List: (Impacting functional limitations): Body Structures, Functions, Activity Limitations Requiring Skilled Therapeutic Intervention: Decreased functional mobility ; Decreased ADL status; Decreased ROM; Decreased body mechanics; Decreased tolerance to work activity; Decreased strength; Decreased endurance; Decreased sensation; Decreased balance; Decreased high-level IADLs; Decreased coordination; Increased pain; Decreased posture     Therapy Prognosis:   Therapy Prognosis: Fair     Assessment Complexity:   Low Complexity  PLAN   Effective Dates: 11/4/2022 TO Plan of Care/Certification Expiration Date: 12/30/22 (Start of POC 11/4/2022)     Frequency/Duration: Plan Frequency: 1-2 visits per week for 8 weeks     Interventions Planned (Treatment may consist of any combination of the following):    Current Treatment Recommendations: Strengthening; ROM; Balance training; Functional mobility training; Transfer training; ADL/Self-care training; Endurance training; Gait training; Stair training; Neuromuscular re-education; Manual Therapy - Soft Tissue Mobilization; Manual Therapy - Joint Manipulation; Pain management; Return to work related activity; Home exercise program; Safety education & training; Patient/Caregiver education & training; Equipment evaluation, education, & procurement; Modalities; Integrated dry needling;  Therapeutic activities     Goals: (Goals have been discussed and agreed upon with patient.)  Short-Term Functional Goals: Time Frame: 9/8/2022 to 10/6/2022  Patient demonstrates independence with home exercise program without verbal cueing provided by therapist. - Ongoing  Pt will report worst pain 3/10 or less at rest in order to return to unrestricted prolonged sitting 30 mins or greater. - Goal met  Pt will report unrestricted sleeping through night 75% of week in order to progress towards sleeping habits consistent with prior level of function. - ongoing  Pt will demonstrate independent supine to sit transfer using log roll technique in order to improve independence and safety with functional transfers. - Goal met  Discharge Goals: Time Frame: 9/8/2022 to 11/3/2022  Pt will report worst pain 3/10 or less with prolonged standing/walking 15 mins or greater in order to return to unrestricted community distance ambulation. - ongoing  Pt will report return to light house work activities with 3/10 pain or less. - ongoing  Pt will demonstrate gross BLE strength 4/5 or greater all planes in order to return to unrestricted childcare activities. - ongoing  ADELFO score of 15/50 or less/greater in order to demonstrate overall functional improvement. - Ongoing           Outcome Measure: Tool Used: Modified Oswestry Low Back Pain Questionnaire  Score:  Initial: 33/50  Most Recent: 36/50 (Date: 11/4/2022 )   Interpretation of Score: Each section is scored on a 0-5 scale, 5 representing the greatest disability. The scores of each section are added together for a total score of 50. Medical Necessity:   > Patient is expected to demonstrate progress in strength, range of motion, balance, coordination, and functional technique to increase independence with community distance ambulation, functional transfers, ADLs and light house work activities. > Skilled intervention continues to be required due to decreased AROM, decreased strength, decreased balance, decreased gait, decreased functional mobility. Reason For Services/Other Comments:  > Patient continues to require skilled intervention due to increasing complexity of exercise. Total Duration:  Time In: 1000  Time Out: 1101    Regarding Audrey Gage's therapy, I certify that the treatment plan above will be carried out by a therapist or under their direction.   Thank you for this referral,  Mami Renner, PT     Referring Physician Signature:  Sandor Cerda _______________________________ Date _____________        Post Session Pain  Charge Capture  PT Visit Info MD Guidelines  [place]

## 2022-11-09 ENCOUNTER — HOSPITAL ENCOUNTER (OUTPATIENT)
Dept: PHYSICAL THERAPY | Age: 57
Setting detail: RECURRING SERIES
End: 2022-11-09
Payer: COMMERCIAL

## 2022-11-09 NOTE — PROGRESS NOTES
Physical Therapy  [address] [place] [address]  Date: 11/9/2022    Patient cancelled due to family reasons.       Lionel Buerger, DPT

## 2022-11-21 ENCOUNTER — HOSPITAL ENCOUNTER (OUTPATIENT)
Dept: PHYSICAL THERAPY | Age: 57
Setting detail: RECURRING SERIES
End: 2022-11-21
Payer: COMMERCIAL

## 2022-11-21 NOTE — PROGRESS NOTES
Physical Therapy  [address] [place] [address]  Date: 11/21/2022    Patient cancelled due to illness.       REBECA ValdesT

## 2022-11-30 ENCOUNTER — HOSPITAL ENCOUNTER (OUTPATIENT)
Dept: PHYSICAL THERAPY | Age: 57
Setting detail: RECURRING SERIES
End: 2022-11-30
Payer: COMMERCIAL

## 2022-11-30 NOTE — PROGRESS NOTES
Physical Therapy  [address] [place] [address]  Date: 11/30/2022    Patient cancelled due to illness.       Lázaro Arshad DPT

## 2023-01-16 DIAGNOSIS — E11.40 TYPE 2 DIABETES MELLITUS WITH DIABETIC NEUROPATHY, UNSPECIFIED WHETHER LONG TERM INSULIN USE (HCC): ICD-10-CM

## 2023-01-22 DIAGNOSIS — S39.012A BACK STRAIN, INITIAL ENCOUNTER: Primary | ICD-10-CM

## 2023-01-22 RX ORDER — CYCLOBENZAPRINE HCL 5 MG
5 TABLET ORAL 2 TIMES DAILY PRN
Qty: 10 TABLET | Refills: 0 | Status: SHIPPED | OUTPATIENT
Start: 2023-01-22 | End: 2023-02-01

## 2023-01-22 NOTE — PROGRESS NOTES
Patient called through answering service because she strained a muscle in her back. She states the pain is excruciating she cannot sit still or move without having pain. She has been taking Advil x2 without much relief. I advised her that she can take ibuprofen 800 mg 3 times a day and combine that with 1000 g of Tylenol 3 times a day as well. I will also call in a muscle relaxer for her and advised her to follow-up with her primary care physician tomorrow and if she experiences any acute numbness or tingling in her legs or bladder/bowel incontinence she should go to the ER immediately. Britni Martinez D.O.   Internal Medicine   [facility]

## 2023-01-24 ENCOUNTER — TELEPHONE (OUTPATIENT)
Dept: INTERNAL MEDICINE CLINIC | Facility: CLINIC | Age: 58
End: 2023-01-24

## 2023-01-24 NOTE — TELEPHONE ENCOUNTER
KADEN:  Pt called saying she had a pinched nerve and a pulled muscle. She said she had seen a on call doctor [facility] on Sunday and been prescribed muscle relaxers. Pt said they did not help at all and that she has only been able to lay on the opposite side and not move and she was looking for advisement on what to do. Tried to offer pt appt. this afternoon so that she could be examined but pt refused and said she was in too much pain to come in and did not want another VV appt. She said if we were unable to help with any more advisement or medication that she would just call the ambulance to take her to the hospital since she didn't feel comfortable driving herself. Spoke with KB who said we would need to see pt and if she was unable to come in to see if someone could drive her to urgent care instead. Spoke with pt who said no one could drive her and she would be calling the ambulance after we hung up. Pt was told if she changed her mind and found someone to bring her to us to call back and we would schedule her today.

## 2023-01-24 NOTE — TELEPHONE ENCOUNTER
Pt was called and she said she does plan on going to the ER today but she needs to wait for her daughter to get off of work so she can get cleaned up before calling EMS.  Pt said the pain is unbearable

## 2023-01-26 ENCOUNTER — TELEMEDICINE (OUTPATIENT)
Dept: INTERNAL MEDICINE CLINIC | Facility: CLINIC | Age: 58
End: 2023-01-26
Payer: COMMERCIAL

## 2023-01-26 DIAGNOSIS — M54.30 SCIATICA, UNSPECIFIED LATERALITY: Primary | ICD-10-CM

## 2023-01-26 DIAGNOSIS — R74.8 ELEVATED LIVER ENZYMES: ICD-10-CM

## 2023-01-26 DIAGNOSIS — M51.36 DEGENERATIVE DISC DISEASE, LUMBAR: ICD-10-CM

## 2023-01-26 DIAGNOSIS — E78.5 HYPERLIPIDEMIA, UNSPECIFIED HYPERLIPIDEMIA TYPE: ICD-10-CM

## 2023-01-26 DIAGNOSIS — E66.01 MORBID OBESITY WITH BMI OF 50.0-59.9, ADULT (HCC): ICD-10-CM

## 2023-01-26 DIAGNOSIS — E11.40 TYPE 2 DIABETES MELLITUS WITH DIABETIC NEUROPATHY, WITHOUT LONG-TERM CURRENT USE OF INSULIN (HCC): ICD-10-CM

## 2023-01-26 DIAGNOSIS — E55.9 VITAMIN D DEFICIENCY: ICD-10-CM

## 2023-01-26 PROCEDURE — 3017F COLORECTAL CA SCREEN DOC REV: CPT | Performed by: NURSE PRACTITIONER

## 2023-01-26 PROCEDURE — 2022F DILAT RTA XM EVC RTNOPTHY: CPT | Performed by: NURSE PRACTITIONER

## 2023-01-26 PROCEDURE — 3046F HEMOGLOBIN A1C LEVEL >9.0%: CPT | Performed by: NURSE PRACTITIONER

## 2023-01-26 PROCEDURE — 99214 OFFICE O/P EST MOD 30 MIN: CPT | Performed by: NURSE PRACTITIONER

## 2023-01-26 PROCEDURE — G8427 DOCREV CUR MEDS BY ELIG CLIN: HCPCS | Performed by: NURSE PRACTITIONER

## 2023-01-26 RX ORDER — PREDNISONE 5 MG/1
TABLET ORAL
Qty: 1 EACH | Refills: 0 | Status: SHIPPED | OUTPATIENT
Start: 2023-01-26

## 2023-01-26 RX ORDER — OXYCODONE HYDROCHLORIDE AND ACETAMINOPHEN 5; 325 MG/1; MG/1
1 TABLET ORAL EVERY 6 HOURS PRN
COMMUNITY
Start: 2023-01-25

## 2023-01-26 ASSESSMENT — ENCOUNTER SYMPTOMS: BACK PAIN: 1

## 2023-01-26 NOTE — PROGRESS NOTES
Virtual Visit  Chief Complaint   Patient presents with    Back Pain    Diabetes         HPI    Low back pain: Sudden onset 4 days ago upon awakening. Right leg radicular pain. Day prior to onset worked on low back stretches for mild back pain. MD on call sent in Flexeril but no benefit from this. Called EMS yesterday but not transported to hospital and instead went to urgent care. Given gabapentin 300 mg  bid and Percocet without any appreciable benefit. Today pain remains severe 10/10 with radiation and numbness. No bowel or bladder incontinence but pain when sitting on toilet. MRI 9/22 with L4-L5: Disc desiccation, minimal anterolisthesis of L4 on L5 measuring 2 mm,  moderate bilateral facet hypertrophy, very shallow circumferential disc bulge. Overall moderate narrowing of central canal and mild narrowing of bilateral neural foramina. Diabetes: Reports home glucose readings much improved. Fasting glucose under 180s. Last A1C over 8% last summer but did not follow up here. Past Medical History, Past Surgical History, Family history, Social History, and Medications were all reviewed and updated as necessary. Current Outpatient Medications   Medication Sig Dispense Refill    predniSONE 5 MG (21) TBPK As directed 1 each 0    oxyCODONE-acetaminophen (PERCOCET) 5-325 MG per tablet Take 1 tablet by mouth every 6 hours as needed. cyclobenzaprine (FLEXERIL) 5 MG tablet Take 1 tablet by mouth 2 times daily as needed for Muscle spasms 10 tablet 0    metFORMIN (GLUCOPHAGE) 500 MG tablet Take 1 tablet by mouth 2 times daily (with meals) 180 tablet 1    Chromium 1000 MCG TABS Take 1,000 mcg by mouth daily      celecoxib (CELEBREX) 100 MG capsule Take 1 capsule by mouth in the morning and 1 capsule before bedtime. 60 capsule 11    escitalopram (LEXAPRO) 10 MG tablet Take 1 tablet by mouth in the morning.  30 tablet 11    clotrimazole-betamethasone (LOTRISONE) 1-0.05 % cream Apply topically 2 times daily. (Patient taking differently: Apply topically 2 times daily as needed) 45 g 3    butalbital-acetaminophen-caffeine (FIORICET, ESGIC) -40 MG per tablet Take 1 tablet by mouth every 6 hours as needed for Headaches TAKE 1 TABLET BY MOUTH EVERY 6 HOURS AS NEEDED FOR HEADACHE 30 tablet 0    ondansetron (ZOFRAN) 4 MG tablet Take 2 tablets by mouth every 8 hours as needed for Nausea or Vomiting 30 tablet 3    lisinopril (PRINIVIL;ZESTRIL) 20 MG tablet Take 1 tablet by mouth in the morning. 30 tablet 5    omeprazole (PRILOSEC) 20 MG delayed release capsule Take 1 capsule by mouth in the morning. 30 capsule 5    Hyoscyamine Sulfate SL (LEVSIN/SL) 0.125 MG SUBL Place 0.125 mg under the tongue every 6 hours as needed (abdominal pain) 15 each 0    Lancets MISC 1 each 4 times daily pt uses accu check elena dx e11.9      albuterol sulfate  (90 Base) MCG/ACT inhaler Inhale 1 puff into the lungs every 6 hours as needed      cycloSPORINE (RESTASIS) 0.05 % ophthalmic emulsion Apply 1 drop to eye every 12 hours      gabapentin (NEURONTIN) 300 MG capsule Take 300 mg by mouth at bedtime. hydroCHLOROthiazide (HYDRODIURIL) 25 MG tablet Take 25 mg by mouth daily       No current facility-administered medications for this visit.      Allergies   Allergen Reactions    Aspirin Other (See Comments)     Stomach upset    Hydrocodone-Acetaminophen Nausea And Vomiting     dizziness    Morphine Anxiety     Patient Active Problem List   Diagnosis    Chronic fatigue    Migraine without aura and without status migrainosus, not intractable    Atherosclerosis of coronary artery    Elevated LFTs    Tension type headache    Type 2 diabetes mellitus with diabetic neuropathy (HCC)    Multiple nevi    S/P appendectomy    Depression, major, recurrent, mild (HCC)    Mild intermittent asthma without complication    Slow transit constipation    Elevated TSH    Vitamin D deficiency    GERD (gastroesophageal reflux disease)    Carpal tunnel syndrome on both sides    Morbid obesity with BMI of 50.0-59.9, adult (HCC)    Hyperlipidemia    Elevated liver enzymes    Other malaise and fatigue    Palpitations    Coronary atherosclerosis    Essential hypertension, benign       ASSESSMENT and PLAN    Audrey was seen today for back pain and diabetes. Diagnoses and all orders for this visit:    Sciatica, unspecified laterality  -     [facility], [facility]  -     predniSONE 5 MG (21) TBPK; As directed    Degenerative disc disease, lumbar  -     [facility]    Type 2 diabetes mellitus with diabetic neuropathy, without long-term current use of insulin ([facility]  -     Hemoglobin A1C; Future    Morbid obesity with BMI of 50.0-59.9, adult ([facility] [address]    Vitamin D deficiency  -     Vitamin D 25 Hydroxy; Future    Hyperlipidemia, unspecified hyperlipidemia type  -     Comprehensive Metabolic Panel; Future  -     Lipid Panel; Future    Elevated liver enzymes    Trial of prednisone in addition to gabapentin and Percocet as prescribed by urgent care. Monitor glucose carefully. Urgent referral to POA. Advise ER if pain severe or weakness / bowel or bladder incontinence. Needs to return for fasting labs and office visit next month as overdue for chronic problem management. FOLLOW UP    Return for needs fasting labs and visit in 1 month. REVIEW OF SYSTEMS    Review of Systems   Constitutional:  Negative for chills and fever. Genitourinary:  Negative for enuresis. Musculoskeletal:  Positive for back pain. Neurological:  Positive for numbness. PHYSICAL EXAM    There were no vitals taken for this visit. Physical Exam  Vitals reviewed. Constitutional:       General: She is in acute distress. Appearance: Normal appearance. She is not ill-appearing.    Pulmonary:      Effort: Pulmonary effort is normal.   Neurological:      Mental Status: She is alert and oriented to person, place, and time. Psychiatric:         Mood and Affect: Mood normal.         Thought Content: Thought content normal.        Medical problems and test results were reviewed with the patient today. Cyn Alford was evaluated through a synchronous (real-time) virtual platform audio-video encounter. Cyn Alford (and/or their health care decision maker) is aware that this is a billable service, which includes applicable co-pays and coverage as determined by their insurance carrier. Verbal consent was obtained and patient identification was verified. This visit was conducted pursuant to the emergency declaration under the [facility], [address] [place] [address] [facility] authority and the [employer] Act. A caregiver was present when appropriate. Ability to conduct a physical exam was limited. The patient was located at home in the state where the provider is licensed to provide care.

## 2023-02-09 ENCOUNTER — OFFICE VISIT (OUTPATIENT)
Dept: ORTHOPEDIC SURGERY | Age: 58
End: 2023-02-09

## 2023-02-09 DIAGNOSIS — M47.816 FACET ARTHROPATHY, LUMBAR: ICD-10-CM

## 2023-02-09 DIAGNOSIS — M54.50 LOW BACK PAIN, UNSPECIFIED BACK PAIN LATERALITY, UNSPECIFIED CHRONICITY, UNSPECIFIED WHETHER SCIATICA PRESENT: Primary | ICD-10-CM

## 2023-02-09 DIAGNOSIS — M43.16 SPONDYLOLISTHESIS OF LUMBAR REGION: ICD-10-CM

## 2023-02-09 DIAGNOSIS — R29.898 RIGHT LEG WEAKNESS: ICD-10-CM

## 2023-02-09 DIAGNOSIS — M54.16 LUMBAR RADICULOPATHY: ICD-10-CM

## 2023-02-09 DIAGNOSIS — M48.061 FORAMINAL STENOSIS OF LUMBAR REGION: ICD-10-CM

## 2023-02-09 DIAGNOSIS — M21.371 RIGHT FOOT DROP: ICD-10-CM

## 2023-02-09 RX ORDER — GABAPENTIN 300 MG/1
300 CAPSULE ORAL 3 TIMES DAILY
Qty: 90 CAPSULE | Refills: 0 | Status: SHIPPED | OUTPATIENT
Start: 2023-02-09 | End: 2023-03-09

## 2023-02-09 RX ORDER — DICLOFENAC POTASSIUM 50 MG/1
50 TABLET, FILM COATED ORAL 3 TIMES DAILY
Qty: 90 TABLET | Refills: 0 | Status: SHIPPED | OUTPATIENT
Start: 2023-02-09

## 2023-02-09 NOTE — PROGRESS NOTES
Name: Rodney Varghese  YOB: 1965  Gender: female  MRN: [id number]    CC: Back Pain (Right side low back to buttock pain down to right foot)       HPI: This is a 62y.o. year old female who  has a past medical history of Anxiety, Asthma, Depression, GERD (gastroesophageal reflux disease), Headache, Hx of seasonal allergies, and Hypertension. .  Patient presents today with acute onset of right lower extremity pain numbness and tingling. She has history of back pain. She was actually referred to [facility] for pain management in the past For lumbar injections. She has been diagnosed with spondylolisthesis at L4-5. She reports over the past 6 months she has had increase in her back pain. She even had MRI scan September 17, 2022. The spondylolisthesis reduces on the MRI scan and there was not significant stenosis. She was told it had not   changed from her prior MRI scan by her PCP. He was participating in physical therapy quite regularly for her back pain. 2 weeks ago she was doing some bending and lifting and had acute pain radiating in the right anterior leg with numbness in the entire right lower leg. She has not been able to to weight-bear, ambulate, lift her right leg. She has not had bladder or bowel incontinence. Her pain has been severe 10 out of 10. She went to urgent care had intramuscular steroid injection, she is use gabapentin, Percocet and oral steroids. AMB PAIN ASSESSMENT 2/9/2023   Location of Pain Back   Location Modifiers Right   Severity of Pain 10   Quality of Pain Aching; Sharp   Duration of Pain Persistent   Frequency of Pain Constant   Date Pain First Started 1/22/2023   Aggravating Factors Walking;Standing   Limiting Behavior Yes   Relieving Factors Ice;Heat;Other (Comment)   Result of Injury No   Work-Related Injury No   Are there other pain locations you wish to document?  No            ROS/Meds/PSH/PMH/FH/SH: I personally reviewed the patient's collected intake data. Below are the pertinents:    Allergies   Allergen Reactions    Aspirin Other (See Comments)     Stomach upset    Hydrocodone-Acetaminophen Nausea And Vomiting     dizziness    Morphine Anxiety         Current Outpatient Medications:     diclofenac (CATAFLAM) 50 MG tablet, Take 1 tablet by mouth 3 times daily, Disp: 90 tablet, Rfl: 0    gabapentin (NEURONTIN) 300 MG capsule, Take 1 capsule by mouth 3 times daily for 28 days. , Disp: 90 capsule, Rfl: 0    predniSONE 5 MG (21) TBPK, As directed, Disp: 1 each, Rfl: 0    oxyCODONE-acetaminophen (PERCOCET) 5-325 MG per tablet, Take 1 tablet by mouth every 6 hours as needed. , Disp: , Rfl:     metFORMIN (GLUCOPHAGE) 500 MG tablet, Take 1 tablet by mouth 2 times daily (with meals), Disp: 180 tablet, Rfl: 1    Chromium 1000 MCG TABS, Take 1,000 mcg by mouth daily, Disp: , Rfl:     celecoxib (CELEBREX) 100 MG capsule, Take 1 capsule by mouth in the morning and 1 capsule before bedtime. , Disp: 60 capsule, Rfl: 11    escitalopram (LEXAPRO) 10 MG tablet, Take 1 tablet by mouth in the morning., Disp: 30 tablet, Rfl: 11    clotrimazole-betamethasone (LOTRISONE) 1-0.05 % cream, Apply topically 2 times daily.  (Patient taking differently: Apply topically 2 times daily as needed), Disp: 45 g, Rfl: 3    butalbital-acetaminophen-caffeine (FIORICET, ESGIC) -40 MG per tablet, Take 1 tablet by mouth every 6 hours as needed for Headaches TAKE 1 TABLET BY MOUTH EVERY 6 HOURS AS NEEDED FOR HEADACHE, Disp: 30 tablet, Rfl: 0    ondansetron (ZOFRAN) 4 MG tablet, Take 2 tablets by mouth every 8 hours as needed for Nausea or Vomiting, Disp: 30 tablet, Rfl: 3    lisinopril (PRINIVIL;ZESTRIL) 20 MG tablet, Take 1 tablet by mouth in the morning., Disp: 30 tablet, Rfl: 5    omeprazole (PRILOSEC) 20 MG delayed release capsule, Take 1 capsule by mouth in the morning., Disp: 30 capsule, Rfl: 5    Hyoscyamine Sulfate SL (LEVSIN/SL) 0.125 MG SUBL, Place 0.125 mg under the tongue every 6 hours as needed (abdominal pain), Disp: 15 each, Rfl: 0    Lancets MISC, 1 each 4 times daily pt uses accu check elena dx e11.9, Disp: , Rfl:     albuterol sulfate  (90 Base) MCG/ACT inhaler, Inhale 1 puff into the lungs every 6 hours as needed, Disp: , Rfl:     cycloSPORINE (RESTASIS) 0.05 % ophthalmic emulsion, Apply 1 drop to eye every 12 hours, Disp: , Rfl:     hydroCHLOROthiazide (HYDRODIURIL) 25 MG tablet, Take 25 mg by mouth daily, Disp: , Rfl:     Past Surgical History:   Procedure Laterality Date    APPENDECTOMY  02/22/2020    HYSTERECTOMY (CERVIX STATUS UNKNOWN)  2014    total     OVARY REMOVAL         Patient Active Problem List   Diagnosis    Chronic fatigue    Migraine without aura and without status migrainosus, not intractable    Atherosclerosis of coronary artery    Elevated LFTs    Tension type headache    Type 2 diabetes mellitus with diabetic neuropathy (HCC)    Multiple nevi    S/P appendectomy    Depression, major, recurrent, mild (HCC)    Mild intermittent asthma without complication    Slow transit constipation    Elevated TSH    Vitamin D deficiency    GERD (gastroesophageal reflux disease)    Carpal tunnel syndrome on both sides    Morbid obesity with BMI of 50.0-59.9, adult (HCC)    Hyperlipidemia    Elevated liver enzymes    Other malaise and fatigue    Palpitations    Coronary atherosclerosis    Essential hypertension, benign         Tobacco:  reports that she has never smoked. She has never used smokeless tobacco.  Alcohol:   Social History     Substance and Sexual Activity   Alcohol Use No        Physical Exam:   BMI: There is no height or weight on file to calculate BMI. GENERAL:  Adult in no acute distress, severely obese Patient is appropriately conversant    The patient ambulates with a not observed - wheelchair gait. ROM of bilateral hip(s) reveals no irritability. NEURO:  Cranial nerves grossly intact. No motor deficits.     Straight leg testing is positive right  Sensory testing reveals intact sensation to light touch and in the distribution of the L3-S1 dermatomes bilaterally, except decrease sensation below the right knee anterior leg to the top of her foot  Ankle jerk is negative for clonus    Reflexes   Right Left   Quadriceps (L4) 1 2   Achilles (S1) 2 2     Strength testing in the lower extremity reveals the following based on the 5 point grading scale:     HF (L2) H Ab (L5) KE (L3/4) ADF (L4) EHL (L5) A Ev (S1) APF (S1)   Right 4 5 3 3- 4 5 5   Left 5 5 5 5 5 5 5     PSYCH:  Alert and oriented X 3. Appropriate affect. Intact judgment and insight. Radiographic Studies:     AP, lateral and spot views of the lumbar spine:  2/9/23  AP of the lumbar spine shows normal alignment. Sagittal view of the lumbar spine reveals bulky facet arthropathy. There is a spondylolisthesis L4-5. No fractures noted. X-ray impression: Spondylolisthesis L4-5. Reviewed the MRI scan of the lumbar spine that was September 17, 2022. The spondylolisthesis reduces upon supine MRI scan. There is mild to moderate right foraminal narrowing at L4-5 no significant foraminal stenosis on the left. Lateral recess stenosis which is mild at L4-5. Assessment/Plan:         Diagnosis Orders   1. Low back pain, unspecified back pain laterality, unspecified chronicity, unspecified whether sciatica present  XR LUMBAR SPINE (2-3 VIEWS)    MRI LUMBAR SPINE WO CONTRAST      2. Spondylolisthesis of lumbar region  MRI LUMBAR SPINE WO CONTRAST      3. Facet arthropathy, lumbar  MRI LUMBAR SPINE WO CONTRAST      4. Right foot drop  MRI LUMBAR SPINE WO CONTRAST      5. Right leg weakness  MRI LUMBAR SPINE WO CONTRAST      6. Foraminal stenosis of lumbar region  MRI LUMBAR SPINE WO CONTRAST      7. Lumbar radiculopathy  MRI LUMBAR SPINE WO CONTRAST            This patient's clinical history and physical exam is consistent with a right  L-4 lumbar radiculopathy.   We know she has spondylolisthesis at L4-5 and previously there was right foraminal narrowing. I suspect she has herniated the disc into the foramen at this level with nerve compression causing weakness of this distribution of the right lower extremity. We discussed the natural history of lumbar radiculopathy in that many of these patients have near complete resolution of their symptoms within eight to twelve weeks with conservative care. We discussed that conservative treatments typically start with activity modification, and medication followed by physical therapy as symptoms allow. Oral and/or epidural steroids are other options. I also discussed potential surgical options if the symptoms fail to improve or there is a progressive neurologic deficit and conservative management has been exhausted. We discussed that surgery is not typically a reliable treatment for isolated back pain, but is usually very reliable in relieving buttock and leg symptoms.          - A MRI was ordered to delineate anatomy, confirm the diagnosis and assess the severity. 5 This is an illness/condition that threatens bodily function    Orders Placed This Encounter   Medications    diclofenac (CATAFLAM) 50 MG tablet     Sig: Take 1 tablet by mouth 3 times daily     Dispense:  90 tablet     Refill:  0    gabapentin (NEURONTIN) 300 MG capsule     Sig: Take 1 capsule by mouth 3 times daily for 28 days. Dispense:  90 capsule     Refill:  0        Orders Placed This Encounter   Procedures    XR LUMBAR SPINE (2-3 VIEWS)    MRI LUMBAR SPINE WO CONTRAST            Return for MRI results [facility] BUSTER. Willam Duran PA-C  02/09/23      Elements of this note were created using speech recognition software. As such, errors of speech recognition may be present.

## 2023-02-09 NOTE — LETTER
El MICHAUD  1965  MRN [id number]                                              ROOM NUMBER______      Radiographic Studies:    Cervical MRI      Thoracic MRI         Lumbar MRI          Pelvis MRI        CONTRAST    CT Myelogram: _______________   NCS/EMG ________________ ( UE  /  LE )     MRI of ___________________          Other: ____________________      Injections:    KNEE    HIP  Depomedrol _____ mg Euflexxa _____    _______________ TFESI/SNRB  _______________ SI Joint  _______________ MARY    _______________ Facet  _______________Piriformis/ Sciatica      Medications:    Oral Steroids _______________  NSAIDS _______________    Muscle Relaxers _______________  Neurontin/Lyrica _______________    Pain Medicine _______________  Other _______________                       Physical Therapy:    Lumbar     Thoracic      Cervical     Hip       Knee       Shoulder               Traction          Ultrasound          Dry Needling      Referral:    Pain referral:  [facility]   [facility]   Other: ______________________________    Follow-up/ Refer__________________________________________________    Authorization to hold blood thinners:___________________________________

## 2023-02-10 DIAGNOSIS — F40.240 CLAUSTROPHOBIA: Primary | ICD-10-CM

## 2023-02-10 RX ORDER — ALPRAZOLAM 0.5 MG/1
0.5 TABLET ORAL ONCE
Qty: 1 TABLET | Refills: 0 | Status: SHIPPED | OUTPATIENT
Start: 2023-02-10 | End: 2023-02-10

## 2023-02-23 DIAGNOSIS — I10 ESSENTIAL HYPERTENSION: ICD-10-CM

## 2023-02-23 DIAGNOSIS — M48.061 SPINAL STENOSIS OF LUMBAR REGION, UNSPECIFIED WHETHER NEUROGENIC CLAUDICATION PRESENT: ICD-10-CM

## 2023-02-23 DIAGNOSIS — M54.30 SCIATICA, UNSPECIFIED LATERALITY: Primary | ICD-10-CM

## 2023-02-23 NOTE — TELEPHONE ENCOUNTER
Pt called requesting a refill of lisinopril (PRINIVIL;ZESTRIL) 20 MG tablet to be sent to [address] [facility] on [address] [place] [address]. Pt also requested to have medication Tramadol called in for the pain in her back. Pt said Beatriz Marte is aware of her situation and how she is bed ridden because of that pain. Pt said she has an appt on Monday with a doctor regarding her back but the motrin she is taking is not helping at all. Please advise pt regarding this matter.

## 2023-02-24 ENCOUNTER — TELEPHONE (OUTPATIENT)
Dept: INTERNAL MEDICINE CLINIC | Facility: CLINIC | Age: 58
End: 2023-02-24

## 2023-02-24 RX ORDER — LISINOPRIL 20 MG/1
20 TABLET ORAL DAILY
Qty: 30 TABLET | Refills: 5 | Status: SHIPPED | OUTPATIENT
Start: 2023-02-24

## 2023-02-24 RX ORDER — TRAMADOL HYDROCHLORIDE 50 MG/1
50 TABLET ORAL EVERY 6 HOURS PRN
Qty: 28 TABLET | Refills: 0 | Status: SHIPPED | OUTPATIENT
Start: 2023-02-24 | End: 2023-03-03

## 2023-02-24 NOTE — TELEPHONE ENCOUNTER
Sent refill for both to pharmacy. have reviewed the patients controlled substance prescription history, as maintained in the [place] prescription monitoring program, so that the prescription(s) for a  controlled substance can be given.

## 2023-02-24 NOTE — TELEPHONE ENCOUNTER
----- Message from [facility] sent at 2/24/2023 12:16 PM EST -----  Subject: Refill Request    QUESTIONS  Name of Medication? lisinopril (PRINIVIL;ZESTRIL) 20 MG tablet  Patient-reported dosage and instructions? lisinopril 20mg once a day  How many days do you have left? 2  Preferred Pharmacy? [address] [facility] [address]  Pharmacy phone number (if available)? [phone]  ---------------------------------------------------------------------------  --------------,  Name of Medication? Other - tramadol  Patient-reported dosage and instructions? does not know  How many days do you have left? 0  Preferred Pharmacy? [address] [facility] [address]  Pharmacy phone number (if available)? [phone]  Additional Information for Provider? pt was getting Tramadol long time ago   but is asking for now due to pain in back, has been in bed for last month   and possibly going to need surgery very uncomfortable  ---------------------------------------------------------------------------  --------------  CALL BACK INFO  What is the best way for the office to contact you? Do not leave any   message, patient will call back for answer  Preferred Call Back Phone Number? [phone]  ---------------------------------------------------------------------------  --------------  SCRIPT ANSWERS  Relationship to Patient?  Self

## 2023-02-27 ENCOUNTER — OFFICE VISIT (OUTPATIENT)
Dept: ORTHOPEDIC SURGERY | Age: 58
End: 2023-02-27

## 2023-02-27 DIAGNOSIS — M48.061 FORAMINAL STENOSIS OF LUMBAR REGION: ICD-10-CM

## 2023-02-27 DIAGNOSIS — M43.16 SPONDYLOLISTHESIS OF LUMBAR REGION: ICD-10-CM

## 2023-02-27 DIAGNOSIS — M54.16 LUMBAR RADICULOPATHY: Primary | ICD-10-CM

## 2023-02-27 DIAGNOSIS — R29.898 RIGHT LEG WEAKNESS: ICD-10-CM

## 2023-02-27 DIAGNOSIS — M21.371 RIGHT FOOT DROP: ICD-10-CM

## 2023-02-27 DIAGNOSIS — M51.16 LUMBAR DISC HERNIATION WITH RADICULOPATHY: ICD-10-CM

## 2023-02-27 NOTE — PROGRESS NOTES
Name: Amanuel Villareal  YOB: 1965  Gender: female  MRN: [id number]    CC: Back Pain (MRI results)       HPI: This is a 62y.o. year old female who  has a past medical history of Anxiety, Asthma, Depression, GERD (gastroesophageal reflux disease), Headache, Hx of seasonal allergies, and Hypertension. .  Patient presents today with acute onset of right lower extremity pain numbness and tingling. She has history of back pain. She was actually referred to [facility] for pain management in the past For lumbar injections. She has been diagnosed with spondylolisthesis at L4-5. She reports over the past 6 months she has had increase in her back pain. She even had MRI scan September 17, 2022. The spondylolisthesis reduces on the MRI scan and there was not significant stenosis. She was told it had not   changed from her prior MRI scan by her PCP. She was participating in physical therapy quite regularly for her back pain. 2 weeks ago she was doing some bending and lifting and had acute pain radiating in the right anterior leg with numbness in the entire right lower leg. She has not been able to to weight-bear, ambulate, lift her right leg. She has not had bladder or bowel incontinence. Her pain has been severe 10 out of 10. She went to urgent care had intramuscular steroid injection, she is use gabapentin, Percocet and oral steroids. We ordered a new MRI scan of the lumbar spine due to the new onset of weakness R L4 distribution. AMB PAIN ASSESSMENT 2/9/2023   Location of Pain Back   Location Modifiers Right   Severity of Pain 10   Quality of Pain Aching; Sharp   Duration of Pain Persistent   Frequency of Pain Constant   Date Pain First Started 1/22/2023   Aggravating Factors Walking;Standing   Limiting Behavior Yes   Relieving Factors Ice;Heat;Other (Comment)   Result of Injury No   Work-Related Injury No   Are there other pain locations you wish to document?  No ROS/Meds/PSH/PMH/FH/SH: I personally reviewed the patient's collected intake data. Below are the pertinents:    Allergies   Allergen Reactions    Aspirin Other (See Comments)     Stomach upset    Hydrocodone-Acetaminophen Nausea And Vomiting     dizziness    Morphine Anxiety         Current Outpatient Medications:     lisinopril (PRINIVIL;ZESTRIL) 20 MG tablet, Take 1 tablet by mouth daily, Disp: 30 tablet, Rfl: 5    traMADol (ULTRAM) 50 MG tablet, Take 1 tablet by mouth every 6 hours as needed for Pain for up to 7 days. Intended supply: 7 days. Take lowest dose possible to manage pain Max Daily Amount: 200 mg, Disp: 28 tablet, Rfl: 0    diclofenac (CATAFLAM) 50 MG tablet, Take 1 tablet by mouth 3 times daily, Disp: 90 tablet, Rfl: 0    gabapentin (NEURONTIN) 300 MG capsule, Take 1 capsule by mouth 3 times daily for 28 days. , Disp: 90 capsule, Rfl: 0    predniSONE 5 MG (21) TBPK, As directed, Disp: 1 each, Rfl: 0    oxyCODONE-acetaminophen (PERCOCET) 5-325 MG per tablet, Take 1 tablet by mouth every 6 hours as needed. , Disp: , Rfl:     metFORMIN (GLUCOPHAGE) 500 MG tablet, Take 1 tablet by mouth 2 times daily (with meals), Disp: 180 tablet, Rfl: 1    Chromium 1000 MCG TABS, Take 1,000 mcg by mouth daily, Disp: , Rfl:     celecoxib (CELEBREX) 100 MG capsule, Take 1 capsule by mouth in the morning and 1 capsule before bedtime. , Disp: 60 capsule, Rfl: 11    escitalopram (LEXAPRO) 10 MG tablet, Take 1 tablet by mouth in the morning., Disp: 30 tablet, Rfl: 11    clotrimazole-betamethasone (LOTRISONE) 1-0.05 % cream, Apply topically 2 times daily.  (Patient taking differently: Apply topically 2 times daily as needed), Disp: 45 g, Rfl: 3    butalbital-acetaminophen-caffeine (FIORICET, ESGIC) -40 MG per tablet, Take 1 tablet by mouth every 6 hours as needed for Headaches TAKE 1 TABLET BY MOUTH EVERY 6 HOURS AS NEEDED FOR HEADACHE, Disp: 30 tablet, Rfl: 0    ondansetron (ZOFRAN) 4 MG tablet, Take 2 tablets by mouth every 8 hours as needed for Nausea or Vomiting, Disp: 30 tablet, Rfl: 3    omeprazole (PRILOSEC) 20 MG delayed release capsule, Take 1 capsule by mouth in the morning., Disp: 30 capsule, Rfl: 5    Hyoscyamine Sulfate SL (LEVSIN/SL) 0.125 MG SUBL, Place 0.125 mg under the tongue every 6 hours as needed (abdominal pain), Disp: 15 each, Rfl: 0    Lancets MISC, 1 each 4 times daily pt uses accu check elena dx e11.9, Disp: , Rfl:     albuterol sulfate  (90 Base) MCG/ACT inhaler, Inhale 1 puff into the lungs every 6 hours as needed, Disp: , Rfl:     cycloSPORINE (RESTASIS) 0.05 % ophthalmic emulsion, Apply 1 drop to eye every 12 hours, Disp: , Rfl:     hydroCHLOROthiazide (HYDRODIURIL) 25 MG tablet, Take 25 mg by mouth daily, Disp: , Rfl:     Past Surgical History:   Procedure Laterality Date    APPENDECTOMY  02/22/2020    HYSTERECTOMY (CERVIX STATUS UNKNOWN)  2014    total     OVARY REMOVAL         Patient Active Problem List   Diagnosis    Chronic fatigue    Migraine without aura and without status migrainosus, not intractable    Atherosclerosis of coronary artery    Elevated LFTs    Tension type headache    Type 2 diabetes mellitus with diabetic neuropathy (HCC)    Multiple nevi    S/P appendectomy    Depression, major, recurrent, mild (HCC)    Mild intermittent asthma without complication    Slow transit constipation    Elevated TSH    Vitamin D deficiency    GERD (gastroesophageal reflux disease)    Carpal tunnel syndrome on both sides    Morbid obesity with BMI of 50.0-59.9, adult (HCC)    Hyperlipidemia    Elevated liver enzymes    Other malaise and fatigue    Palpitations    Coronary atherosclerosis    Essential hypertension, benign         Tobacco:  reports that she has never smoked. She has never used smokeless tobacco.  Alcohol:   Social History     Substance and Sexual Activity   Alcohol Use No        Physical Exam:   BMI: There is no height or weight on file to calculate BMI.     GENERAL:  Adult in no acute distress, severely obese Patient is appropriately conversant    The patient ambulates with a not observed - wheelchair gait. ROM of bilateral hip(s) reveals no irritability. NEURO:  Cranial nerves grossly intact. No motor deficits. Straight leg testing is positive right  Sensory testing reveals intact sensation to light touch and in the distribution of the L3-S1 dermatomes bilaterally, except decrease sensation below the right knee anterior leg to the top of her foot  Ankle jerk is negative for clonus    Reflexes   Right Left   Quadriceps (L4) 1 2   Achilles (S1) 2 2     Strength testing in the lower extremity reveals the following based on the 5 point grading scale:     HF (L2) H Ab (L5) KE (L3/4) ADF (L4) EHL (L5) A Ev (S1) APF (S1)   Right 4 5 3 3- 4 5 5   Left 5 5 5 5 5 5 5     PSYCH:  Alert and oriented X 3. Appropriate affect. Intact judgment and insight. Radiographic Studies:     AP, lateral and spot views of the lumbar spine:  2/9/23  AP of the lumbar spine shows normal alignment. Sagittal view of the lumbar spine reveals bulky facet arthropathy. There is a spondylolisthesis L4-5. No fractures noted. X-ray impression: Spondylolisthesis L4-5. Reviewed the MRI scan of the lumbar spine that was September 17, 2022. The spondylolisthesis reduces upon supine MRI scan. There is mild to moderate right foraminal narrowing at L4-5 no significant foraminal stenosis on the left. Lateral recess stenosis which is mild at L4-5. MRI Result (most recent): MRI LUMBAR SPINE WO CONTRAST 02/17/2023    Narrative  STUDY: MRI LUMBAR SPINE WO CONTRAST [id number]    STUDY DATE: 2/17/2023 11:42 AM    HISTORY: Low back pain, unspecified; Spondylolisthesis, lumbar region;  Spondylosis without myelopathy or radiculopathy, lumbar region; Foot drop, right  foot;  Other symptoms and signs involving the musculoskeletal system; Spinal  stenosis, lumbar region without neurogenic claudication; Radiculopathy, lumbar  re    COMPARISON:  Radiographs performed February 9, 2023. MRI performed September 17, 2022. TECHNIQUE: Multisequence, multiplanar MR images were obtained of the lumbar  spine without the administration of intravenous contrast.      CONTRAST: None. FINDINGS:    SPINAL CORD: Normal morphology. The conus medullaris terminates at the L1  vertebral body level. No focal abnormalities the cauda equina. NUMBERING: Last fully formed disc space is designatedL5/S1. BONES: Possible intraosseous hemangioma within the T11 and L2 vertebral bodies. No diffuse marrow signal abnormality. No fracture or marrow edema like signal.    DISCS:  Disc desiccation at L4-L5 and L5-S1 without significant intervertebral  disc height loss. ALIGNMENT: Minimal anterolisthesis of L4 on L5. SOFT TISSUES: The aorta is normal in course and caliber. No suspicious  incidental soft tissue abnormality. DEGENERATIVE:    T12-L1: No spinal canal or neural foraminal narrowing. L1-L2: Mild bilateral facet arthropathy without spinal canal or neural foraminal  narrowing. L2-L3: No spinal canal or neural foraminal narrowing. L3-L4: Mild bilateral facet arthropathy without spinal canal or neural foraminal  narrowing. L4-L5: Anterolisthesis results in mild uncovering of the disc. Circumferential  disc bulge with a new right subarticular disc protrusion. Moderate bilateral  facet arthropathy and ligamentum flavum hypertrophy. Moderate spinal canal  stenosis, unchanged. Moderate right neural foraminal narrowing with probable  contact upon the exiting right L4 nerve root, progressed. No significant left  neural foraminal narrowing. L5-S1: Moderate bilateral facet arthropathy. Shallow circumferential disc bulge. No spinal canal or significant neural foraminal narrowing.     Impression  Interval progression of multilevel lumbar spondylosis, as detailed above,  predominantly at L4-L5, where there is a new right subarticular disc protrusion  contributing to moderate right neural foraminal narrowing with probable contact  upon the exiting right L4 nerve root. Unchanged moderate spinal canal stenosis. I have independently reviewed the MRI of the lumbar spine. There have been increasing degenerative changes L4-5 with a new right subarticular disc protrusion contributing to moderate right foraminal narrowing and abutment of the exiting right L4 nerve root. Moderate stenosis at this level. Assessment/Plan:         Diagnosis Orders   1. Lumbar radiculopathy        2. Foraminal stenosis of lumbar region        3. Lumbar disc herniation with radiculopathy        4. Spondylolisthesis of lumbar region        5. Right leg weakness        6. Right foot drop                This patient's clinical history and physical exam is consistent with a right  L-4 lumbar radiculopathy. We know she has spondylolisthesis at L4-5 and previously there was right foraminal narrowing. This is concordant with findings on the new MRI scan with there is a right disc herniation at L4-5 with foraminal narrowing and this is new compared to the MRI scan September 2022. We discussed that conservative treatments typically start with activity modification, and medication followed by physical therapy as symptoms allow. Oral and/or epidural steroids are other options. She has already had oral steroids and in the past she had epidural steroid injection and had a reaction to this. I discussed with Dr. Buddy Geller potential for attempting any lumbar injections and she does not recommend lumbar injections with concern for anaphylaxis. We do not know what the patient was allergic to reveals a steroid with a contrast.  Before pursuing any lumbar injections, she recommends allergy testing. The patient does have weakness of L4 distribution she is developing a foot drop on the right.   This is concerning and I therefore need to further discuss surgical intervention with Dr. Cathie Opitz. The concerns for surgery would be that this would require lumbar fusion at L4-5 because of the spondylolisthesis. Patient has BMI 52. The distance from dorsal skin to the spine is proximately 160 mm. Due to her morbid obesity, there would be risk for infection, failure of fusion. I will need to review her case with Dr. Cathie Opitz to see if he would be able to offer her a surgical option. 5 This is an illness/condition that threatens bodily function    No orders of the defined types were placed in this encounter. No orders of the defined types were placed in this encounter. Over 50% of this visit of 40 minutes was spent reviewing prior medical records and imaging, current imaging, diagnosis, treatment options, patient counseling and/or patient cooridination of care. Addendum: I have reviewed the patient's case and images with Dr. Lucy Tabor. He feels the soft tissue envelope exceeds our retractor length and therefore would not be able to address this problem surgically. We will treat her weakness and foot drop with physical therapy and an AFO. She could be referred for allergy testing to see if she would be a candidate for any lumbar injections and this may not be a bad idea for future. We can refer her to bariatric clinic for weight loss consultation. Ultimately weight loss is significantly important to prevent further decline of her spine degeneration and if any future surgery is indicated she will need to have weight loss. Return for will call. Ada Schmitz PA-C  02/28/23      Elements of this note were created using speech recognition software. As such, errors of speech recognition may be present.

## 2023-02-27 NOTE — LETTER
Agustin Avitia                                                     MAYA WRIGHTT  1965  MRN [id number]                                              ROOM NUMBER______      Radiographic Studies:    Cervical MRI      Thoracic MRI         Lumbar MRI          Pelvis MRI        CONTRAST    CT Myelogram: _______________   NCS/EMG ________________ ( UE  /  LE )     MRI of ___________________          Other: ____________________      Injections:    KNEE    HIP  Depomedrol _____ mg Euflexxa _____    _______________ TFESI/SNRB  _______________ SI Joint  _______________ MARY    _______________ Facet  _______________Piriformis/ Sciatica      Medications:    Oral Steroids _______________  NSAIDS _______________    Muscle Relaxers _______________  Neurontin/Lyrica _______________    Pain Medicine _______________  Other _______________                       Physical Therapy:    Lumbar     Thoracic      Cervical     Hip       Knee       Shoulder               Traction          Ultrasound          Dry Needling      Referral:    Pain referral:  [facility]   [facility]   Other: ______________________________    Follow-up/ Refer__________________________________________________    Authorization to hold blood thinners:___________________________________

## 2023-02-27 NOTE — Clinical Note
I called patient to let her know I talked to Cdv.   She needs an AFO brace, referral to [facility], referral to bariatric cliniic

## 2023-02-28 DIAGNOSIS — M48.061 FORAMINAL STENOSIS OF LUMBAR REGION: ICD-10-CM

## 2023-02-28 DIAGNOSIS — R29.898 RIGHT LEG WEAKNESS: ICD-10-CM

## 2023-02-28 DIAGNOSIS — M43.16 SPONDYLOLISTHESIS OF LUMBAR REGION: ICD-10-CM

## 2023-02-28 DIAGNOSIS — M21.371 RIGHT FOOT DROP: ICD-10-CM

## 2023-02-28 DIAGNOSIS — M54.16 LUMBAR RADICULOPATHY: Primary | ICD-10-CM

## 2023-02-28 DIAGNOSIS — M47.816 FACET ARTHROPATHY, LUMBAR: ICD-10-CM

## 2023-02-28 DIAGNOSIS — M51.16 LUMBAR DISC HERNIATION WITH RADICULOPATHY: ICD-10-CM

## 2023-03-01 DIAGNOSIS — M48.061 FORAMINAL STENOSIS OF LUMBAR REGION: ICD-10-CM

## 2023-03-01 DIAGNOSIS — M47.816 FACET ARTHROPATHY, LUMBAR: ICD-10-CM

## 2023-03-01 DIAGNOSIS — M54.50 LOW BACK PAIN, UNSPECIFIED BACK PAIN LATERALITY, UNSPECIFIED CHRONICITY, UNSPECIFIED WHETHER SCIATICA PRESENT: ICD-10-CM

## 2023-03-01 DIAGNOSIS — M21.371 RIGHT FOOT DROP: ICD-10-CM

## 2023-03-01 DIAGNOSIS — M43.16 SPONDYLOLISTHESIS OF LUMBAR REGION: ICD-10-CM

## 2023-03-01 DIAGNOSIS — M54.16 LUMBAR RADICULOPATHY: Primary | ICD-10-CM

## 2023-03-01 DIAGNOSIS — R29.898 RIGHT LEG WEAKNESS: ICD-10-CM

## 2023-03-01 DIAGNOSIS — E66.01 MORBID OBESITY WITH BMI OF 50.0-59.9, ADULT (HCC): ICD-10-CM

## 2023-03-01 DIAGNOSIS — M51.16 LUMBAR DISC HERNIATION WITH RADICULOPATHY: ICD-10-CM

## 2023-03-07 ENCOUNTER — HOSPITAL ENCOUNTER (OUTPATIENT)
Dept: PHYSICAL THERAPY | Age: 58
Setting detail: RECURRING SERIES
Discharge: HOME | End: 2023-03-10
Payer: COMMERCIAL

## 2023-03-07 PROCEDURE — 97116 GAIT TRAINING THERAPY: CPT

## 2023-03-07 PROCEDURE — 97110 THERAPEUTIC EXERCISES: CPT

## 2023-03-07 PROCEDURE — 97161 PT EVAL LOW COMPLEX 20 MIN: CPT

## 2023-03-07 ASSESSMENT — PAIN SCALES - GENERAL: PAINLEVEL_OUTOF10: 8

## 2023-03-07 NOTE — PROGRESS NOTES
[address] [place] [address]  : 1965  Primary: [address])  Secondary:  [address] @ [address] [place] [id number]  Phone: [phone]  Fax: [phone] Plan Frequency: 1-2 visits per week for 8 weeks    Plan of Care/Certification Expiration Date: 23 (Start of [address] 3/7/2023)      PT Visit Info:  Plan Frequency: 1-2 visits per week for 8 weeks  Plan of Care/Certification Expiration Date: 23 (Start of POC 3/7/2023)    Visit Count:  1    OUTPATIENT PHYSICAL THERAPY:OP NOTE TYPE: Treatment Note 3/7/2023       Episode  }Appt Desk             Treatment Diagnosis:  Facet arthropathy, lumbar radiculopathy (M54.16)  Lumbar disc herniation of lumbar region (M48.061)  Low back pain (M54.5)  Right foot drop (M21.371)  Muscle weakness generalized (M62.81)  Difficulty walking, not elsewhere classified (R26.2)  Medical/Referring Diagnosis:  Lumbar radiculopathy [M54.16]  Foraminal stenosis of lumbar region [M48.061]  Lumbar disc herniation with radiculopathy [M51.16]  Spondylolisthesis of lumbar region [M43.16]  Right leg weakness [R29.898]  Right foot drop [M21.371]  Facet arthropathy, lumbar [Z43.689]  Referring Physician:  Jose Maurer MD Orders:  PT Eval and Treat  Date of Onset:  Onset Date: 23 (Exacerbation of low back and radicular RLE symptoms.)     Allergies:   Aspirin, Hydrocodone-acetaminophen, and Morphine  Restrictions/Precautions:  Restrictions/Precautions: Fall Risk  No data recorded     Interventions Planned (Treatment may consist of any combination of the following):    Current Treatment Recommendations: Strengthening; ROM; Balance training; Functional mobility training; Transfer training; ADL/Self-care training;  Endurance training; Gait training; Stair training; Neuromuscular re-education; Manual; Pain management; Home exercise program; Safety education & training; Patient/Caregiver education & training; Equipment evaluation, education, & procurement; Modalities; Dry needling; Therapeutic activities     Subjective Comments:  Pt states \"I'm surprised I was even able to get up for this appt today\". Pt reports most days pain is severe 10/10. Per pt mostly laying in bed during the day at this time due to pain severity with standing and walking activities. Initial:}    8/10Post Session:       9/10  Medications Last Reviewed:  3/7/2023  Updated Objective Findings:  See evaluation note from today  Treatment   THERAPEUTIC EXERCISE: (40 minutes: 25 min therex, 15 min gait training):    Exercises per grid below to improve mobility, strength, and coordination. Required moderate visual, verbal, manual, and tactile cues to promote proper body alignment, promote proper body poster and proper body mechanics. Progressed resistance, range, repetitions and complexity of movement as indicated. Date:  3/7/2023 Date:   Date:     Activity/Exercise Parameters Parameters Parameters   PPT Seated, x20     TA activation Seated, 10x5 sec     Seated lumbar flexion X20      Transfer training Verbal review of sit to stand transfer body mechanics and supine to sit using log roll technique. Gait training Level surface ambulation with front wheeled walking 200 ft x1, CGA to Min A    Curb/incline navigation using front wheeled walking, CGA to Min A. Time spent with patient reviewing proper muscle recruitment and technique with exercises. MANUAL THERAPY: (0 minutes):   Joint mobilization and Soft tissue mobilization was utilized and necessary because of the patient's restricted joint motion, painful spasm, loss of articular motion, and restricted motion of soft tissue. - Soft tissue mobilization: not today  - Joint mobs: not today    MODALITIES: (0 minutes):        None today can consider moist heat/ice with IFC if tolerated to low back for pain control.        HEP: As above; handouts given to patient for all exercises. Treatment/Session Summary:    Treatment Assessment:  Fair to poor tolerance to exercise performance today; pt demonstrates significant foot drop while walking, PT recommending use of assitive device and more specifically a front wheeled walker at this time to reduce patient falls risk. Pt verbalized unwillingness to use assistive device for ambulation however after practicing walking with walker during today's visit she verbalized more willing to use a walker at home and in community. PT reinforced importance of following up with referring MD regarding need for AFO to address RLE foot drop. Pt verbalized understanding. Communication/Consultation:   HEP handout provided. Equipment provided today:  None  Recommendations/Intent for next treatment session: Next visit will focus on pain control, improve lumbar stability/core strength, improve BLE strength, improve lumbar AROM, progress functional mobility and functional activity tolerance. Total Treatment Billable Duration:  55 minutes, 15 min eval, 25 min therex, 15 min gait training.   Time In: 1012  Time Out: 4250 Dolly Blattila., PT       Charge Capture  }Post Session Pain  PT Visit [facility]  [address]    Future Appointments   Date Time Provider Dylan Nascimento   3/15/2023  1:30 PM Tessy Jesus, PT [facility]   3/17/2023 12:30 PM Tessy Jesus, PT [facility]   3/21/2023  1:30 PM Tessy Jseus, PT [facility]   3/23/2023 10:00 AM Tessy Jesus, PT [facility]   3/28/2023  2:30 PM Tessy Jesus, PT [facility]   3/30/2023  1:30 PM Tessy Jesus, PT [facility]   4/4/2023  1:30 PM Tessy Jesus, PT [facility]   4/6/2023 12:30 PM Tessy Jesus, PT [facility]   4/11/2023  1:30 PM Tessy Jesus, PT [facility]   4/13/2023  2:30 PM Tessy Jesus, PT [facility]   4/18/2023  1:30 PM Tessy Jesus, PT [facility]   4/20/2023 12:30 PM Tessy Jesus, PT [facility]   4/25/2023  1:30 PM Tessy Lee, PT [facility]   4/27/2023  1:30 PM Tammy Meade, PT [facility]

## 2023-03-07 NOTE — THERAPY EVALUATION
Luis Arteaga  : 1965  Primary: [address])  Secondary:  [address] @ [address] [place] [address]  Phone: [phone]  Fax: [phone] Plan Frequency: 1-2 visits per week for 8 weeks  Plan of Care/Certification Expiration Date: 23 (Start of [address] 3/7/2023)    PT Visit Info:  Plan Frequency: 1-2 visits per week for 8 weeks  Plan of Care/Certification Expiration Date: 23 (Start of POC 3/7/2023)    Visit Count:  1                OUTPATIENT PHYSICAL THERAPY:             OP NOTE TYPE: Initial Assessment 3/7/2023               Episode (Lumbar radiculopathy RLE) Appt Desk         Treatment Diagnosis:  Facet arthropathy, lumbar radiculopathy (M54.16)  Lumbar disc herniation of lumbar region (M48.061)  Low back pain (M54.5)  Right foot drop (M21.371)  Muscle weakness generalized (M62.81)  Difficulty walking, not elsewhere classified (R26.2)  Medical/Referring Diagnosis:  Lumbar radiculopathy [M54.16]  Foraminal stenosis of lumbar region [M48.061]  Lumbar disc herniation with radiculopathy [M51.16]  Spondylolisthesis of lumbar region [M43.16]  Right leg weakness [R29.898]  Right foot drop [M21.371]  Facet arthropathy, lumbar [U73.202]  Referring Physician:  Stacie Jimenez MD Orders:  PT Eval and Treat   Return MD Appt:  TBD by patient  Date of Onset:  Onset Date: 23 (Exacerbation of low back and radicular RLE symptoms.)    Allergies:  Aspirin, Hydrocodone-acetaminophen, and Morphine  Restrictions/Precautions:    Restrictions/Precautions: Fall Risk      Medications Last Reviewed:  3/7/2023     SUBJECTIVE   History of Injury/Illness (Reason for Referral):  Mrs. Sujatha Mayer is a 61 y/o female with complaints of LBP and radicular RLE numbness/tingling/weakness onset in  and exacerbation of symptoms 2023.  Per pt symptoms were well controlled however assisted a friend with lifting and carrying activities and woke up unable to stand the following day. Per pt has seen MD who performed MRI imaging indicating disc herniation L4-5 with lumbar degenerative changes including spondylolisthesis and foraminal stenosis. Pt reports has taken oral steroid medication which only minimally helped her symptoms. Per pt MD discussed spinal injection treatment however due to pt history of allergic reactions to medication pt has been instructed to visit allergist prior to undergoing any spinal injections. Pt denies bowel/bladder dysfunction. Pt reports difficulty with prolonged standing and walking activities and pt does not use AD for ambulation. Pt reports has fallen in previous 1 month due to weakness in RLE. Pt reports foot drop symptoms in RLE for which MD recommended AFO and pt has not pursued this option at this time. Pt referred by MD for PT eval and treat. Pt will benefit from skilled PT treatment to address deficits in strength, AROM, balance, gait and functional mobility in order to return to prior level of function and improve quality of life. Patient Stated Goal(s):  \"I'm want to be able to walk and move like I used to. \"  Initial:     8/10 Post Session:     9/10  Past Medical History/Comorbidities:   Ms. Castro Timmons  has a past medical history of Anxiety, Asthma, Depression, GERD (gastroesophageal reflux disease), Headache, Hx of seasonal allergies, and Hypertension. Ms. Castro Timmons  has a past surgical history that includes Appendectomy (02/22/2020);  Hysterectomy (2014); and Ovary removal.  Social History/Living Environment:   Lives With: Spouse  Type of Home: House  Home Layout: Two level     Prior Level of Function/Work/Activity:   Prior level of function: Independent           Learning:   Does the patient/guardian have any barriers to learning?: No barriers  Will there be a co-learner?: No  What is the preferred language of the patient/guardian?: English  Is an  required?: No  How does the patient/guardian prefer to learn new concepts?: Listening; Reading; Demonstration; Pictures/Videos     Fall Risk Scale: Dorman Total Score: 50  Dorman Fall Risk: High (45 and higher)           OBJECTIVE   Observation/Postural and Gait Assessment: Gait: Moderate antalgic gait pattern RLE, Min A for safety, visible foot drop on RLE and difficulty progressing RLE forward during right swing phase of gait; Posture: Fair standing posture, decreased weight bearing through RLE, increased lumbar lordosis and thoracic kyphosis curves. Palpation: Moderate tenderness to palpation noted lumbar paraspinals     AROM:   Lumbar extension: neutral°   Lumbar flexion: Unable due to pain°   Lumbar left side bend: NT°   Lumbar right side bend: NT°       Strength:  Manual Muscle Test (out of 5) Left Right   Knee extension 5 4-   Knee flexion 4+ 3+   Hip flexion 4 3-   Hip abduction 4- NT   Ankle DF 5 2-   Ankle PF NT NT     5 times sit to stand test: NT 3/7/2023    Passive Accessory Motion: NT due to patient unable to lay prone comfortably. Special Tests:  NT due to high symptom irritability. Neurological Screen:  Myotomes: Key muscle strength testing through bilateral LE is diminished RLE L3-5 myotomes. Dermatomes: Sensation testing through bilateral lower quadrants for light touch is Diminished RLE L3-5 distribution. Reflexes: Patellar (L4) and Achilles (S1) are diminished RLE and absent RLE. Neural tension tests: Passive straight leg raise (SLR) test is NT. Crossed SLR test is NT. Slump test is NT. Femoral nerve stretch test is NT. Functional Mobility:  Sit to stand: excessive trunk flexion to initiate sit to stand, BUE assist and CGA from PT for safety, fair initial standing balance observed upon standing. Supine to sit: NT due to high symptom irritability.     ASSESSMENT   Initial Assessment:  Mrs. Cecy Orellana is a 61 y/o female with complaints of LBP and radicular RLE numbness/tingling/weakness onset in 2022 and exacerbation of symptoms Jan 2023. Per pt symptoms were well controlled however assisted a friend with lifting and carrying activities and woke up unable to stand the following day. Per pt has seen MD who performed MRI imaging indicating disc herniation L4-5 with lumbar degenerative changes including spondylolisthesis and foraminal stenosis. Pt reports has taken oral steroid medication which only minimally helped her symptoms. Per pt MD discussed spinal injection treatment however due to pt history of allergic reactions to medication pt has been instructed to visit allergist prior to undergoing any spinal injections. Pt denies bowel/bladder dysfunction. Pt reports difficulty with prolonged standing and walking activities and pt does not use AD for ambulation. Pt reports has fallen in previous 1 month due to weakness in RLE. Pt reports foot drop symptoms in RLE for which MD recommended AFO and pt has not pursued this option at this time. Pt referred by MD for PT eval and treat. Pt will benefit from skilled PT treatment to address deficits in strength, AROM, balance, gait and functional mobility in order to return to prior level of function and improve quality of life. Problem List: (Impacting functional limitations): Body Structures, Functions, Activity Limitations Requiring Skilled Therapeutic Intervention: Decreased functional mobility ; Decreased ADL status; Decreased ROM; Decreased body mechanics; Decreased tolerance to work activity; Decreased strength; Decreased endurance; Decreased balance; Decreased sensation;  Increased pain; Decreased posture; Decreased coordination     Therapy Prognosis:   Therapy Prognosis: Fair     Initial Assessment Complexity:   Decision Making: Low Complexity    PLAN   Effective Dates: 3/7/2023 TO Plan of Care/Certification Expiration Date: 05/02/23 (Start of POC 3/7/2023)   Frequency/Duration: Plan Frequency: 1-2 visits per week for 8 weeks   Interventions Planned (Treatment may consist of any combination of the following):    Current Treatment Recommendations: Strengthening; ROM; Balance training; Functional mobility training; Transfer training; ADL/Self-care training; Endurance training; Gait training; Stair training; Neuromuscular re-education; Manual; Pain management; Home exercise program; Safety education & training; Patient/Caregiver education & training; Equipment evaluation, education, & procurement; Modalities; Dry needling; Therapeutic activities     Goals: (Goals have been discussed and agreed upon with patient.)  Short-Term Functional Goals: Time Frame: 3/7/2023 to 4/4/2023  Patient demonstrates independence with home exercise program without verbal cueing provided by therapist.  Pt will report worst pain 3/10 or less at rest in order to return to unrestricted prolonged sitting 30 mins or greater. Pt will report unrestricted sleeping through night 75% of week in order to progress towards sleeping habits consistent with prior level of function. Pt will demonstrate independent supine to sit transfer using log roll technique in order to improve independence and safety with functional transfers. Pt will demonstrate ability to walk 1 lap in PT clinic using front wheeled walker supervision assist in order to progress independence with household distance ambulation. Discharge Goals: Time Frame: 3/7/2023 to 5/2/2023  Pt will report worst pain 3/10 or less with prolonged standing/walking 15 mins or greater in order to return to unrestricted community distance ambulation. Pt will demonstrate 5 time sit to stand test of 20 sec or less in order to demonstrate gross BLE strength improvement. Pt will demonstrate lumbar active flexion to 45 degrees or greater in order to return to unrestricted functional bending activities. ADELFO score of 25/50 or less/greater in order to demonstrate overall functional improvement. Outcome Measure:    Tool Used: Modified Oswestry Low Back Pain Questionnaire  Score:  Initial: 39/50  Most Recent: X/50 (Date: -- )   Interpretation of Score: Each section is scored on a 0-5 scale, 5 representing the greatest disability. The scores of each section are added together for a total score of 50. Medical Necessity:   > Patient is expected to demonstrate progress in strength, range of motion, balance, coordination, and functional technique to increase independence with community distance ambulation, functional transfers, ADLs, and light house work activities. > Skilled intervention continues to be required due to decreased AROM, decreased strength, decreased balance, decreased gait, decreased functional mobility. Reason For Services/Other Comments:  > Patient continues to require skilled intervention due to increasing complexity of exercise. Total Duration:  Time In: 1012  Time Out: 1113    Regarding Audrey Gage's therapy, I certify that the treatment plan above will be carried out by a therapist or under their direction.   Thank you for this referral,  Marty Loera, PT     Referring Physician Signature: Isabel Dean PA-C _______________________________ Date _____________        Post Session Pain  Charge Capture  PT Visit Info MD Guidelines  MyChart

## 2023-03-15 ENCOUNTER — HOSPITAL ENCOUNTER (OUTPATIENT)
Dept: PHYSICAL THERAPY | Age: 58
Setting detail: RECURRING SERIES
Discharge: HOME | End: 2023-03-18
Payer: COMMERCIAL

## 2023-03-15 PROCEDURE — G0283 ELEC STIM OTHER THAN WOUND: HCPCS

## 2023-03-15 PROCEDURE — 97116 GAIT TRAINING THERAPY: CPT

## 2023-03-15 PROCEDURE — 97110 THERAPEUTIC EXERCISES: CPT

## 2023-03-15 ASSESSMENT — PAIN SCALES - GENERAL: PAINLEVEL_OUTOF10: 6

## 2023-03-15 NOTE — PROGRESS NOTES
[address]  : 1965  Primary: [address] ([address])  Secondary:  [address] @ [address] [facility] [address]  Phone: [phone]  Fax: [phone] Plan Frequency: 1-2 visits per week for 8 weeks    Plan of Care/Certification Expiration Date: 23 (Start of [address] 3/7/2023)      PT Visit Info:  Plan Frequency: 1-2 visits per week for 8 weeks  Plan of Care/Certification Expiration Date: 23 (Start of POC 3/7/2023)      Visit Count:  2    OUTPATIENT PHYSICAL THERAPY:OP NOTE TYPE: Treatment Note 3/15/2023       Episode  }Appt Desk             Treatment Diagnosis:  Facet arthropathy, lumbar radiculopathy (M54.16)  Lumbar disc herniation of lumbar region (M48.061)  Low back pain (M54.5)  Right foot drop (M21.371)  Muscle weakness generalized (M62.81)  Difficulty walking, not elsewhere classified (R26.2)  Medical/Referring Diagnosis:  Lumbar radiculopathy [M54.16]  Foraminal stenosis of lumbar region [M48.061]  Lumbar disc herniation with radiculopathy [M51.16]  Spondylolisthesis of lumbar region [M43.16]  Right leg weakness [R29.898]  Right foot drop [M21.371]  Facet arthropathy, lumbar [O17.170]  Referring Physician:  Den Arshad MD Orders:  PT Eval and Treat  Date of Onset:  Onset Date: 23 (Exacerbation of low back and radicular RLE symptoms.)     Allergies:   Aspirin, Hydrocodone-acetaminophen, and Morphine  Restrictions/Precautions:  Restrictions/Precautions: Fall Risk  No data recorded     Interventions Planned (Treatment may consist of any combination of the following):    Current Treatment Recommendations: Strengthening; ROM; Balance training; Functional mobility training; Transfer training; ADL/Self-care training;  Endurance training; Gait training; Stair training; Neuromuscular re-education; Manual; Pain management; Home exercise program; Safety education & training; Patient/Caregiver education & training; Equipment evaluation, education, & procurement; Modalities; Dry needling; Therapeutic activities     Subjective Comments:  Pt reports improved standing and walking ability at home since last visit; improved DF control reported at start of treatment. Initial:}    6/10Post Session:       5/10  Medications Last Reviewed:  3/15/2023  Updated Objective Findings:   Gait: Improved gait speed and foot clearance observed with front wheeled walker; continued foot drop observed RLE   Treatment   THERAPEUTIC EXERCISE: (44 minutes: 34 min therex, 10 min gait training):    Exercises per grid below to improve mobility, strength, and coordination. Required moderate visual, verbal, manual, and tactile cues to promote proper body alignment, promote proper body poster and proper body mechanics. Progressed resistance, range, repetitions and complexity of movement as indicated. Date:  3/7/2023 Date:  3/15/23 Date:     Activity/Exercise Parameters Parameters Parameters   PPT Seated, x20 Seated x30    TA activation Seated, 10x5 sec [id number]    Seated lumbar flexion X20  Stability ball, x30    Sciatic nerve glide  --    Seated toe raise  x20    Seated heel raise  x20    Hip adduction with TA  Next visit    Hip abduction with TA  Next visit          Transfer training Verbal review of sit to stand transfer body mechanics and supine to sit using log roll technique. Verbal review of sit to stand with TA contraction; supine to sit using log roll technique. Gait training Level surface ambulation with front wheeled walking 200 ft x1, CGA to Min A    Curb/incline navigation using front wheeled walking, CGA to Min A. Level surface ambulation with front wheeled walker 200 ft x2      Time spent with patient reviewing proper muscle recruitment and technique with exercises.     MANUAL THERAPY: (0 minutes):   Joint mobilization and Soft tissue mobilization was utilized and necessary because of the patient's restricted joint motion, painful spasm, loss of articular motion, and restricted motion of soft tissue. - Soft tissue mobilization: not today  - Joint mobs: not today    MODALITIES: (10 minutes): *  Hot Pack Therapy in order to provide analgesia, relieve muscle spasm, reduce inflammation and edema, and with IFC to low back for additional pain control. HEP: As above; handouts given to patient for all exercises. Treatment/Session Summary:    Treatment Assessment:  Significant improved tolerance to exercise treatment observed today compared to previous visit. Continued signs of foot drop observed. Communication/Consultation:   HEP handout provided. Equipment provided today:  None  Recommendations/Intent for next treatment session: Next visit will focus on pain control, improve lumbar stability/core strength, improve BLE strength, improve lumbar AROM, progress functional mobility and functional activity tolerance. Total Treatment Billable Duration:  54 mins.   Time In: 1329  Time Out: 225 [facility], PT       Charge Capture  }Post Session Pain  PT Visit [address] [facility] Portal  MD [address]    Future Appointments   Date Time Provider Dylan Nascimento   3/17/2023 12:30 PM Sherlon Valeria, PT [facility]   3/21/2023  1:30 PM Sherlon Valeria, PT [facility]   3/23/2023 10:00 AM Sherlon Valeria, PT [facility]   3/28/2023  2:30 PM Sherlon Valeria, PT [facility]   3/30/2023  1:30 PM Sherlon Valeria, PT [facility]   4/4/2023  1:30 PM Sherlon Valeria, PT [facility]   4/6/2023 12:30 PM Sherlon Valeria, PT [facility]   4/11/2023  1:30 PM Sherlon Valeria, PT [facility]   4/13/2023  2:30 PM Sherlon Valeria, PT [facility]   4/18/2023  1:30 PM Sherlon Valeria, PT [facility]   4/20/2023 12:30 PM Sherlon Valeria, PT [facility]   4/25/2023  1:30 PM Sherlon Valeria, PT [facility]   4/27/2023  1:30 PM Sherlon Valeria, PT [facility]

## 2023-03-17 ENCOUNTER — HOSPITAL ENCOUNTER (OUTPATIENT)
Dept: PHYSICAL THERAPY | Age: 58
Setting detail: RECURRING SERIES
End: 2023-03-17
Payer: COMMERCIAL

## 2023-03-17 DIAGNOSIS — K21.9 GASTROESOPHAGEAL REFLUX DISEASE WITHOUT ESOPHAGITIS: ICD-10-CM

## 2023-03-17 NOTE — PROGRESS NOTES
Physical Therapy  [address] [place] [address]  Date: 3/17/2023    Patient cancelled due to increased pain levels today.       Liam Underwood DPT

## 2023-03-17 NOTE — TELEPHONE ENCOUNTER
Pt said her Gabapentin  Dalphine Aase wrote back on 11/18/21 taking  QD Qt 90 with one RF was recently increased to taking 300 mg TID now and she needs refills. Pt also asked for refill on her Omeprazole 20 mg )last written 7/19/22 Qt 30 with 5 RF  Pt had VV on 1/26/23 and no future kisha set

## 2023-03-17 NOTE — TELEPHONE ENCOUNTER
Pt called saying she needs a refill of gabapentin (NEURONTIN) 300 MG capsule () to be sent to [facility] on  in [facility]. Pt is completely out.

## 2023-03-18 RX ORDER — OMEPRAZOLE 20 MG/1
20 CAPSULE, DELAYED RELEASE ORAL DAILY
Qty: 30 CAPSULE | Refills: 5 | Status: SHIPPED | OUTPATIENT
Start: 2023-03-18

## 2023-03-18 RX ORDER — GABAPENTIN 300 MG/1
300 CAPSULE ORAL 3 TIMES DAILY
Qty: 90 CAPSULE | Refills: 5 | Status: SHIPPED | OUTPATIENT
Start: 2023-03-18 | End: 2023-04-15

## 2023-03-20 RX ORDER — GABAPENTIN 300 MG/1
CAPSULE ORAL
Qty: 90 CAPSULE | Refills: 0 | OUTPATIENT
Start: 2023-03-20

## 2023-03-21 ENCOUNTER — HOSPITAL ENCOUNTER (OUTPATIENT)
Dept: PHYSICAL THERAPY | Age: 58
Setting detail: RECURRING SERIES
Discharge: HOME | End: 2023-03-24
Payer: COMMERCIAL

## 2023-03-21 PROCEDURE — 97110 THERAPEUTIC EXERCISES: CPT

## 2023-03-21 PROCEDURE — G0283 ELEC STIM OTHER THAN WOUND: HCPCS

## 2023-03-21 ASSESSMENT — PAIN SCALES - GENERAL: PAINLEVEL_OUTOF10: 2

## 2023-03-21 NOTE — PROGRESS NOTES
motion, painful spasm, loss of articular motion, and restricted motion of soft tissue. - Soft tissue mobilization: not today  - Joint mobs: not today    MODALITIES: (15 minutes): *  Hot Pack Therapy in order to provide analgesia, relieve muscle spasm, reduce inflammation and edema, and with IFC to low back for additional pain control. HEP: As above; handouts given to patient for all exercises. Treatment/Session Summary:    Treatment Assessment:    Increased back pain due to patient lifting her leg too high with seated exercises. Communication/Consultation:   HEP handout provided. Equipment provided today:  None  Recommendations/Intent for next treatment session: Next visit will focus on pain control, improve lumbar stability/core strength, improve BLE strength, improve lumbar AROM, progress functional mobility and functional activity tolerance. Total Treatment Billable Duration:  55 mins.   Time In: 1330  Time Out: 3200 [facility], PTA       Charge Capture  }Post Session Pain  PT Visit Info  [employer] Portal  MD Guidelines  Scanned Media  Benefits  MyChart    Future Appointments   Date Time Provider Dylan Nascimento   3/23/2023 10:00 AM Molina Fearing, PT [facility]   3/28/2023  2:30 PM Molina Fearing, PT [facility]   3/30/2023  1:30 PM Molina Fearing, PT [facility]   4/4/2023  1:30 PM Molina Fearing, PT [facility]   4/6/2023 12:30 PM Molina Fearing, PT [facility]   4/11/2023  1:30 PM Molina Fearing, PT [facility]   4/13/2023  2:30 PM Molina Fearing, PT [facility]   4/18/2023  1:30 PM Molina Fearing, PT [facility]   4/20/2023 12:30 PM Molina Fearing, PT [facility]   4/25/2023  1:30 PM Molina Fearing, PT [facility]   4/27/2023  1:30 PM Molina Fearing, PT [facility]

## 2023-03-23 ENCOUNTER — HOSPITAL ENCOUNTER (OUTPATIENT)
Dept: PHYSICAL THERAPY | Age: 58
Setting detail: RECURRING SERIES
End: 2023-03-23
Payer: COMMERCIAL

## 2023-03-23 NOTE — PROGRESS NOTES
Physical Therapy  [address] [place] [address]  Date: 3/23/2023    Patient cancelled her appointment today due to transportation problem.       Eliseo Wilcox DPT

## 2023-03-27 ENCOUNTER — TELEPHONE (OUTPATIENT)
Dept: INTERNAL MEDICINE CLINIC | Facility: CLINIC | Age: 58
End: 2023-03-27

## 2023-03-27 NOTE — TELEPHONE ENCOUNTER
Regarding: FW: Medicine   I sent pt a My Chart message and I also left her a voice message asking for a call back   ----- Message -----  From: CESILIA Schmid CNP  Sent: 3/22/2023   9:29 AM EDT  To: Maureen Peter MA  Subject: Medicine                                         ----- Message from CESILIA Schmid CNP sent at 3/22/2023  9:29 AM EDT -----       ----- Message from Ranjith Garcia, [address] [employer] [address] to CESILIA Schmid CNP sent at 3/21/2023  6:00 PM -----   I would like to know if you can refill the tramadol that Im taking for pain. Thank you.

## 2023-03-27 NOTE — TELEPHONE ENCOUNTER
Regarding: FW: Medicine   I sent pt a My Chart message and I also left her a voice message asking for a call back   ----- Message -----  From: CESILIA Ledesma CNP  Sent: 3/22/2023   9:29 AM EDT  To: Alexandria Guillen MA  Subject: Medicine                                         ----- Message from CESILIA Ledesma CNP sent at 3/22/2023  9:29 AM EDT -----       ----- Message from Ranjith Garcia, [address] [employer] to CESILIA Ledesma CNP sent at 3/21/2023  6:00 PM -----   I would like to know if you can refill the tramadol that Im taking for pain. Thank you.

## 2023-03-27 NOTE — TELEPHONE ENCOUNTER
I spoke to pt and she said she was aware Macdonald Noe had sent in rx Tramadol. Pt said due to her weight no surgery has been set yet.  I asked her about her ov with Derek OAKLEY and note re:  her allergic to poss epidural steroid injection and note sating she will hira for have this check out by an allergist. I asked pt to please call Dr Nadine Sanchez office to follow up on the need for allergy testing to to call me back after she specks to them

## 2023-03-28 ENCOUNTER — HOSPITAL ENCOUNTER (OUTPATIENT)
Dept: PHYSICAL THERAPY | Age: 58
Setting detail: RECURRING SERIES
Discharge: HOME | End: 2023-03-31
Payer: COMMERCIAL

## 2023-03-28 PROCEDURE — 97110 THERAPEUTIC EXERCISES: CPT

## 2023-03-28 PROCEDURE — G0283 ELEC STIM OTHER THAN WOUND: HCPCS

## 2023-03-28 ASSESSMENT — PAIN SCALES - GENERAL: PAINLEVEL_OUTOF10: 3

## 2023-03-28 NOTE — PROGRESS NOTES
[address]  : 1965  Primary: [address] ([address])  Secondary:  [address] @ [address] [facility] [address]  Phone: [phone]  Fax: [phone] Plan Frequency: 1-2 visits per week for 8 weeks    Plan of Care/Certification Expiration Date: 23 (Start of [address] 3/7/2023)      PT Visit Info:  Plan Frequency: 1-2 visits per week for 8 weeks  Plan of Care/Certification Expiration Date: 23 (Start of POC 3/7/2023)      Visit Count:  4    OUTPATIENT PHYSICAL THERAPY:OP NOTE TYPE: Treatment Note 3/28/2023       Episode  }Appt Desk             Treatment Diagnosis:  Facet arthropathy, lumbar radiculopathy (M54.16)  Lumbar disc herniation of lumbar region (M48.061)  Low back pain (M54.5)  Right foot drop (M21.371)  Muscle weakness generalized (M62.81)  Difficulty walking, not elsewhere classified (R26.2)  Medical/Referring Diagnosis:  Lumbar radiculopathy [M54.16]  Foraminal stenosis of lumbar region [M48.061]  Lumbar disc herniation with radiculopathy [M51.16]  Spondylolisthesis of lumbar region [M43.16]  Right leg weakness [R29.898]  Right foot drop [M21.371]  Facet arthropathy, lumbar [M42.237]  Referring Physician:  Lynnann Spurling MD Orders:  PT Eval and Treat  Date of Onset:  Onset Date: 23 (Exacerbation of low back and radicular RLE symptoms.)     Allergies:   Aspirin, Hydrocodone-acetaminophen, and Morphine  Restrictions/Precautions:  Restrictions/Precautions: Fall Risk  No data recorded     Interventions Planned (Treatment may consist of any combination of the following):    Current Treatment Recommendations: Strengthening; ROM; Balance training; Functional mobility training; Transfer training; ADL/Self-care training;  Endurance training; Gait training; Stair training; Neuromuscular re-education; Manual; Pain management; Home exercise program; Safety education & training; Patient/Caregiver education &

## 2023-03-30 ENCOUNTER — HOSPITAL ENCOUNTER (OUTPATIENT)
Dept: PHYSICAL THERAPY | Age: 58
Setting detail: RECURRING SERIES
End: 2023-03-30
Payer: COMMERCIAL

## 2023-03-30 PROCEDURE — 97110 THERAPEUTIC EXERCISES: CPT

## 2023-03-30 PROCEDURE — G0283 ELEC STIM OTHER THAN WOUND: HCPCS

## 2023-03-30 ASSESSMENT — PAIN SCALES - GENERAL: PAINLEVEL_OUTOF10: 3

## 2023-03-30 NOTE — PROGRESS NOTES
Shital Villavicencio  : 1965  Primary: [address])  [address]:  [address] @ [address] [place], [place] [address]  Phone: [phone]  Fax: [phone] Plan Frequency: 1-2 visits per week for 8 weeks    Plan of Care/Certification Expiration Date: 23 (Start of [address] 3/7/2023)      PT Visit Info:  Plan Frequency: 1-2 visits per week for 8 weeks  Plan of Care/Certification Expiration Date: 23 (Start of POC 3/7/2023)      Visit Count:  5    OUTPATIENT PHYSICAL THERAPY:OP NOTE TYPE: Treatment Note 3/30/2023       Episode  }Appt Desk             Treatment Diagnosis:  Facet arthropathy, lumbar radiculopathy (M54.16)  Lumbar disc herniation of lumbar region (M48.061)  Low back pain (M54.5)  Right foot drop (M21.371)  Muscle weakness generalized (M62.81)  Difficulty walking, not elsewhere classified (R26.2)  Medical/Referring Diagnosis:  Lumbar radiculopathy [M54.16]  Foraminal stenosis of lumbar region [M48.061]  Lumbar disc herniation with radiculopathy [M51.16]  Spondylolisthesis of lumbar region [M43.16]  Right leg weakness [R29.898]  Right foot drop [M21.371]  Facet arthropathy, lumbar [M04.281]  Referring Physician:  Lynne Diaz MD Orders:  PT Eval and Treat  Date of Onset:  Onset Date: 23 (Exacerbation of low back and radicular RLE symptoms.)     Allergies:   Aspirin, Hydrocodone-acetaminophen, and Morphine  Restrictions/Precautions:  Restrictions/Precautions: Fall Risk  No data recorded     Interventions Planned (Treatment may consist of any combination of the following):    Current Treatment Recommendations: Strengthening; ROM; Balance training; Functional mobility training; Transfer training; ADL/Self-care training;  Endurance training; Gait training; Stair training; Neuromuscular re-education; Manual; Pain management; Home exercise program; Safety education & training; Patient/Caregiver education & training; Equipment evaluation, education, & procurement; Modalities; Dry needling; Therapeutic activities     Subjective Comments:  Pt reports some increased soreness following last visit however over \"not bad\". HEP compliance good. Initial:}    3/10Post Session:       1/10  Medications Last Reviewed:  3/30/2023  Updated Objective Findings:   Gait: no foot drop observed during level surface ambulation today. Treatment   THERAPEUTIC EXERCISE: (43 mins )    Exercises per grid below to improve mobility, strength, and coordination. Required moderate visual, verbal, manual, and tactile cues to promote proper body alignment, promote proper body poster and proper body mechanics. Progressed resistance, range, repetitions and complexity of movement as indicated. Date:  3/28/2023 Date:  3/30/23 Date:  3/21/23   Activity/Exercise Parameters Parameters Parameters   Posterior pelvic tilts Seated, 2x30 Seated 2x30 Seated x 30 reps    TA activation Seated, 10x5 sec Seated, [id number] Seated  x 10 reps 5 sec hold    Seated lumbar flexion 3X30  Stability ball, 3x30 Stability ball x 30 reps blue ball   Sciatic nerve glide Seated x10 Seated x20  Seated x 30 reps    Seated toe raise x30 x30 X 30 reps    Seated heel raise x30 x30 X 30 reps   Hip adduction with TA Yellow ball, x20 Yellow, x20 -----   Hip abduction with TA Red TB, x20 Red TB, x20 ------   Hip flexor stretching  [id number]                      Transfer training Verbal review of sit to stand transfer body mechanics and supine to sit using log roll technique. --- ------         Gait training  Level surface ambulation, 1 lap no AD; verbal cues for TA contraction while walking. -------     Time spent with patient reviewing proper muscle recruitment and technique with exercises.     MANUAL THERAPY: (0 minutes):   Joint mobilization and Soft tissue mobilization was utilized and necessary because of the patient's restricted joint motion, painful spasm, loss of articular motion,

## 2023-04-04 ENCOUNTER — HOSPITAL ENCOUNTER (OUTPATIENT)
Dept: PHYSICAL THERAPY | Age: 58
Setting detail: RECURRING SERIES
End: 2023-04-04
Payer: COMMERCIAL

## 2023-04-06 ENCOUNTER — HOSPITAL ENCOUNTER (OUTPATIENT)
Dept: PHYSICAL THERAPY | Age: 58
Setting detail: RECURRING SERIES
End: 2023-04-06
Payer: COMMERCIAL

## 2023-04-13 ENCOUNTER — HOSPITAL ENCOUNTER (OUTPATIENT)
Dept: PHYSICAL THERAPY | Age: 58
Setting detail: RECURRING SERIES
End: 2023-04-13
Payer: COMMERCIAL

## 2023-04-18 ENCOUNTER — HOSPITAL ENCOUNTER (OUTPATIENT)
Dept: PHYSICAL THERAPY | Age: 58
Setting detail: RECURRING SERIES
Discharge: HOME | End: 2023-04-21
Payer: COMMERCIAL

## 2023-04-18 PROCEDURE — G0283 ELEC STIM OTHER THAN WOUND: HCPCS

## 2023-04-18 PROCEDURE — 97110 THERAPEUTIC EXERCISES: CPT

## 2023-04-18 ASSESSMENT — PAIN SCALES - GENERAL: PAINLEVEL_OUTOF10: 5

## 2023-04-18 NOTE — PROGRESS NOTES
Pt had hysterectamy this week, and had a catheter in.  She is complaining of having the urge to urinate frequently. Please advise patient.    
Spoke with patient who states that she is having urinary urgency, frequency, and pressure.  She states that she had surgery about a week ago and this just started last night.  She reports that her symptoms are getting worse with the frequency and urgency as the day goes on but is not wanting to be seen in urgent care or walk in clinic tonight.  She states that she would like a prescription sent to the pharmacy if possible.  She was made aware that Bactrim DS would be sent but if she develops any fever or back pain she will need to be seen right away.  She verbalized understanding and denied further questions or concerns.   
training; Equipment evaluation, education, & procurement; Modalities; Dry needling; Therapeutic activities     Subjective Comments:  Pt reports wasn't able to make last appointment due to increased widespread joint and muscle pain making it diffiuclty to move; per pt took prescribed celebrex which helped reduce symptoms. Initial:}    5/10Post Session:       1/10  Medications Last Reviewed:  4/18/2023  Updated Objective Findings:   See progress note dated 4/18/2023. Treatment   THERAPEUTIC EXERCISE: (44 mins )    Exercises per grid below to improve mobility, strength, and coordination. Required moderate visual, verbal, manual, and tactile cues to promote proper body alignment, promote proper body poster and proper body mechanics. Progressed resistance, range, repetitions and complexity of movement as indicated. Date:  4/18/2023 Date:  3/30/23 Date:  4/11/23   Activity/Exercise Parameters Parameters Parameters   Posterior pelvic tilts Seated, 2x30 Seated 2x30 Seated 2 x 30 reps    TA activation Seated, 10x5 sec Seated, [id number] Seated  x 10 reps 5 sec hold    Seated lumbar flexion Stability ball, 3x30 Stability ball, 3x30 Stability ball 3x30 reps blue ball   Sciatic nerve glide Seated x30 Seated x20  Seated x 30 reps    Seated toe raise x30 x30 X 30 reps    Seated heel raise x30 x30 X 30 reps   Hip adduction with TA Yellow ball, x30 Yellow, x20 Yellow, x30   Hip abduction with TA Green TB, x20 Red TB, x20 Red TB, x30   Hip flexor stretching -- [id number] --   Sit to stand TA focus, 2x5                 Transfer training -- --- ------         Gait training Level surface ambulation, 1 lap no AD; verbal cues for TA contraction while walking. Level surface ambulation, 1 lap no AD; verbal cues for TA contraction while walking. Level surface ambulation, 1 lap no AD; verbal cues for TA contraction while walking. Time spent with patient reviewing proper muscle recruitment and technique with exercises.     MANUAL
order to return to unrestricted functional bending activities. - ongoing  ADELFO score of 25/50 or less/greater in order to demonstrate overall functional improvement. - ongoing         Outcome Measure: Tool Used: Modified Oswestry Low Back Pain Questionnaire  Score:  Initial: 39/50  Most Recent: NT/50 (Date: 4/18/2023)   Interpretation of Score: Each section is scored on a 0-5 scale, 5 representing the greatest disability. The scores of each section are added together for a total score of 50. Medical Necessity:   > Patient is expected to demonstrate progress in strength, range of motion, balance, coordination, and functional technique to increase independence with community distance ambulation, functional transfers, ADLs, and light house work activities. > Skilled intervention continues to be required due to decreased AROM, decreased strength, decreased balance, decreased gait, decreased functional mobility. Reason For Services/Other Comments:  > Patient continues to require skilled intervention due to increasing complexity of exercise. Total Duration:  Time In: 1335  Time Out: 1432    Regarding Audrey Gage's therapy, I certify that the treatment plan above will be carried out by a therapist or under their direction. Thank you for this referral,  Alfredo Dolan PT     Referring Physician Signature: Kiersten Ribeiro PA-C No Signature is Required for this note.         Post Session Pain  Charge Capture  PT Visit Info MD Guidelines  MyChart

## 2023-04-20 ENCOUNTER — HOSPITAL ENCOUNTER (OUTPATIENT)
Dept: PHYSICAL THERAPY | Age: 58
Setting detail: RECURRING SERIES
Discharge: HOME | End: 2023-04-23
Payer: COMMERCIAL

## 2023-04-20 PROCEDURE — 97110 THERAPEUTIC EXERCISES: CPT

## 2023-04-20 PROCEDURE — G0283 ELEC STIM OTHER THAN WOUND: HCPCS

## 2023-04-20 ASSESSMENT — PAIN SCALES - GENERAL: PAINLEVEL_OUTOF10: 2

## 2023-04-25 ENCOUNTER — HOSPITAL ENCOUNTER (OUTPATIENT)
Dept: PHYSICAL THERAPY | Age: 58
Setting detail: RECURRING SERIES
Discharge: HOME | End: 2023-04-28
Payer: COMMERCIAL

## 2023-04-25 PROCEDURE — 97110 THERAPEUTIC EXERCISES: CPT

## 2023-04-25 PROCEDURE — G0283 ELEC STIM OTHER THAN WOUND: HCPCS

## 2023-04-25 ASSESSMENT — PAIN SCALES - GENERAL: PAINLEVEL_OUTOF10: 6

## 2023-04-25 NOTE — PROGRESS NOTES
Oni Fonseca  : 1965  Primary: [address])  Secondary:  [address] @ [address] [place] [address]  Phone: [phone]  Fax: [phone] Plan Frequency: 1-2 visits per week for 8 weeks    Plan of Care/Certification Expiration Date: 23 (Start of [address] 3/7/2023)      PT Visit Info:  Plan Frequency: 1-2 visits per week for 8 weeks  Plan of Care/Certification Expiration Date: 23 (Start of POC 3/7/2023)      Visit Count:  9    OUTPATIENT PHYSICAL THERAPY:OP NOTE TYPE: Treatment Note 2023       Episode  }Appt Desk             Treatment Diagnosis:  Facet arthropathy, lumbar radiculopathy (M54.16)  Lumbar disc herniation of lumbar region (M48.061)  Low back pain (M54.5)  Right foot drop (M21.371)  Muscle weakness generalized (M62.81)  Difficulty walking, not elsewhere classified (R26.2)  Medical/Referring Diagnosis:  Lumbar radiculopathy [M54.16]  Foraminal stenosis of lumbar region [M48.061]  Lumbar disc herniation with radiculopathy [M51.16]  Spondylolisthesis of lumbar region [M43.16]  Right leg weakness [R29.898]  Right foot drop [M21.371]  Facet arthropathy, lumbar [C98.610]  Referring Physician:  Kaykay Reed MD Orders:  PT Eval and Treat  Date of Onset:  Onset Date: 23 (Exacerbation of low back and radicular RLE symptoms.)     Allergies:   Aspirin, Hydrocodone-acetaminophen, and Morphine  Restrictions/Precautions:  Restrictions/Precautions: Fall Risk  No data recorded     Interventions Planned (Treatment may consist of any combination of the following):    Current Treatment Recommendations: Strengthening; ROM; Balance training; Functional mobility training; Transfer training; ADL/Self-care training;  Endurance training; Gait training; Stair training; Neuromuscular re-education; Manual; Pain management; Home exercise program; Safety education & training; Patient/Caregiver education &

## 2023-04-27 ENCOUNTER — HOSPITAL ENCOUNTER (OUTPATIENT)
Dept: PHYSICAL THERAPY | Age: 58
Setting detail: RECURRING SERIES
Discharge: HOME | End: 2023-04-30
Payer: COMMERCIAL

## 2023-04-27 PROCEDURE — G0283 ELEC STIM OTHER THAN WOUND: HCPCS

## 2023-04-27 PROCEDURE — 97110 THERAPEUTIC EXERCISES: CPT

## 2023-04-27 ASSESSMENT — PAIN SCALES - GENERAL: PAINLEVEL_OUTOF10: 3

## 2023-05-01 NOTE — PROGRESS NOTES
I am accessing Ms. Gage's chart as a part of our department's internal chart auditing process. I certify that Ms. Cora Miller is, or was, a patient in our department.   Thank you,  CLAY JANE, PTA  5/1/2023

## 2023-05-03 ENCOUNTER — HOSPITAL ENCOUNTER (OUTPATIENT)
Dept: PHYSICAL THERAPY | Age: 58
Setting detail: RECURRING SERIES
Discharge: HOME | End: 2023-05-06
Payer: COMMERCIAL

## 2023-05-03 PROCEDURE — G0283 ELEC STIM OTHER THAN WOUND: HCPCS

## 2023-05-03 PROCEDURE — 97110 THERAPEUTIC EXERCISES: CPT

## 2023-05-03 ASSESSMENT — PAIN SCALES - GENERAL: PAINLEVEL_OUTOF10: 3

## 2023-05-03 NOTE — PROGRESS NOTES
[address]  : 1965  Primary: [address])  Secondary:  [address] @ [address] [place] [address]  Phone: [phone]  Fax: [phone] Plan Frequency: 1-2 visits per week for 8 weeks    Plan of Care/Certification Expiration Date: 23 (Start of POC 5/3/2023)      PT Visit Info:  Plan Frequency: 1-2 visits per week for 8 weeks  Plan of Care/Certification Expiration Date: 23 (Start of POC 5/3/2023)      Visit Count:  11    OUTPATIENT PHYSICAL THERAPY:OP NOTE TYPE: Treatment Note 5/3/2023       Episode  }Appt Desk             Treatment Diagnosis:  Facet arthropathy, lumbar radiculopathy (M54.16)  Lumbar disc herniation of lumbar region (M48.061)  Low back pain (M54.5)  Right foot drop (M21.371)  Muscle weakness generalized (M62.81)  Difficulty walking, not elsewhere classified (R26.2)  Medical/Referring Diagnosis:  Lumbar radiculopathy [M54.16]  Foraminal stenosis of lumbar region [M48.061]  Lumbar disc herniation with radiculopathy [M51.16]  Spondylolisthesis of lumbar region [M43.16]  Right leg weakness [R29.898]  Right foot drop [M21.371]  Facet arthropathy, lumbar [Y68.151]  Referring Physician:  Sparkle Jennings MD Orders:  PT Eval and Treat  Date of Onset:  Onset Date: 23 (Exacerbation of low back and radicular RLE symptoms.)     Allergies:   Aspirin, Hydrocodone-acetaminophen, and Morphine  Restrictions/Precautions:  Restrictions/Precautions: Fall Risk  No data recorded     Interventions Planned (Treatment may consist of any combination of the following):    Current Treatment Recommendations: Strengthening; ROM; Balance training; Functional mobility training; Transfer training; ADL/Self-care training;  Endurance training; Gait training; Stair training; Neuromuscular re-education; Manual; Pain management; Home exercise program; Safety education & training; Patient/Caregiver education &

## 2023-05-03 NOTE — THERAPY RECERTIFICATION
Ramses Hardin  : 1965  Primary: [address])  [address]:  [address] @ [address] [place], [place] [address]  Phone: [phone]  Fax: [phone] Plan Frequency: 1-2 visits per week for 8 weeks  Plan of Care/Certification Expiration Date: 23 (Start of POC 5/3/2023)      PT Visit Info:  Plan Frequency: 1-2 visits per week for 8 weeks  Plan of Care/Certification Expiration Date: 23 (Start of POC 5/3/2023)      Visit Count:  11                OUTPATIENT PHYSICAL THERAPY:             OP NOTE TYPE: Recertification 3/1/1908               Episode (Lumbar radiculopathy RLE) Appt Desk         Treatment Diagnosis:  Facet arthropathy, lumbar radiculopathy (M54.16)  Lumbar disc herniation of lumbar region (M48.061)  Low back pain (M54.5)  Right foot drop (M21.371)  Muscle weakness generalized (M62.81)  Difficulty walking, not elsewhere classified (R26.2)  Medical/Referring Diagnosis:  Lumbar radiculopathy [M54.16]  Foraminal stenosis of lumbar region [M48.061]  Lumbar disc herniation with radiculopathy [M51.16]  Spondylolisthesis of lumbar region [M43.16]  Right leg weakness [R29.898]  Right foot drop [M21.371]  Facet arthropathy, lumbar [N01.864]  Referring Physician:  Alfa Ledezma MD Orders:  PT Eval and Treat   Return MD Appt:  TBD by patient  Date of Onset:  Onset Date: 23 (Exacerbation of low back and radicular RLE symptoms.)      Allergies:  Aspirin, Hydrocodone-acetaminophen, and Morphine  Restrictions/Precautions:    Restrictions/Precautions: Fall Risk      Medications Last Reviewed:  5/3/2023      OBJECTIVE   Observation/Postural and Gait Assessment: Gait: Reduced antalgic gait pattern observed, mild forward flexed trunk, no observed foot drop; Posture: Fair standing posture, equal weight bearing observed BLEs, increased lumbar lordosis and thoracic kyphosis curves.     Palpation: Moderate

## 2023-05-09 ENCOUNTER — HOSPITAL ENCOUNTER (OUTPATIENT)
Dept: PHYSICAL THERAPY | Age: 58
Setting detail: RECURRING SERIES
Discharge: HOME | End: 2023-05-12
Payer: COMMERCIAL

## 2023-05-09 PROCEDURE — G0283 ELEC STIM OTHER THAN WOUND: HCPCS

## 2023-05-09 PROCEDURE — 97110 THERAPEUTIC EXERCISES: CPT

## 2023-05-09 ASSESSMENT — PAIN SCALES - GENERAL: PAINLEVEL_OUTOF10: 2

## 2023-05-09 NOTE — PROGRESS NOTES
[address]  : 1965  Primary: [address])  Secondary:  [address] @ [address] [place] [address]  Phone: [phone]  Fax: [phone] Plan Frequency: 1-2 visits per week for 8 weeks    Plan of Care/Certification Expiration Date: 23 (Start of [address] 5/3/2023)      PT Visit Info:  Plan Frequency: 1-2 visits per week for 8 weeks  Plan of Care/Certification Expiration Date: 23 (Start of POC 5/3/2023)      Visit Count:  12    OUTPATIENT PHYSICAL THERAPY:OP NOTE TYPE: Treatment Note 2023       Episode  }Appt Desk             Treatment Diagnosis:  Facet arthropathy, lumbar radiculopathy (M54.16)  Lumbar disc herniation of lumbar region (M48.061)  Low back pain (M54.5)  Right foot drop (M21.371)  Muscle weakness generalized (M62.81)  Difficulty walking, not elsewhere classified (R26.2)  Medical/Referring Diagnosis:  Lumbar radiculopathy [M54.16]  Foraminal stenosis of lumbar region [M48.061]  Lumbar disc herniation with radiculopathy [M51.16]  Spondylolisthesis of lumbar region [M43.16]  Right leg weakness [R29.898]  Right foot drop [M21.371]  Facet arthropathy, lumbar [Z88.973]  Referring Physician:  Mariela Don MD Orders:  PT Eval and Treat  Date of Onset:  Onset Date: 23 (Exacerbation of low back and radicular RLE symptoms.)     Allergies:   Aspirin, Hydrocodone-acetaminophen, and Morphine  Restrictions/Precautions:  Restrictions/Precautions: Fall Risk  No data recorded     Interventions Planned (Treatment may consist of any combination of the following):    Current Treatment Recommendations: Strengthening; ROM; Balance training; Functional mobility training; Transfer training; ADL/Self-care training;  Endurance training; Gait training; Stair training; Neuromuscular re-education; Manual; Pain management; Home exercise program; Safety education & training; Patient/Caregiver education &

## 2023-05-11 ENCOUNTER — FOLLOWUP TELEPHONE ENCOUNTER (OUTPATIENT)
Dept: DIABETES SERVICES | Age: 58
End: 2023-05-11

## 2023-05-11 NOTE — TELEPHONE ENCOUNTER
Called pt to let her know all diabetes education now free. Pt scheduled for all 4 type 2 diabetes classes.

## 2023-05-12 ENCOUNTER — HOSPITAL ENCOUNTER (OUTPATIENT)
Dept: PHYSICAL THERAPY | Age: 58
Setting detail: RECURRING SERIES
Discharge: HOME | End: 2023-05-15
Payer: COMMERCIAL

## 2023-05-12 PROCEDURE — 97110 THERAPEUTIC EXERCISES: CPT

## 2023-05-12 PROCEDURE — G0283 ELEC STIM OTHER THAN WOUND: HCPCS

## 2023-05-12 ASSESSMENT — PAIN SCALES - GENERAL: PAINLEVEL_OUTOF10: 2

## 2023-05-16 ENCOUNTER — HOSPITAL ENCOUNTER (OUTPATIENT)
Dept: PHYSICAL THERAPY | Age: 58
Setting detail: RECURRING SERIES
End: 2023-05-16
Payer: COMMERCIAL

## 2023-05-18 ENCOUNTER — HOSPITAL ENCOUNTER (OUTPATIENT)
Dept: PHYSICAL THERAPY | Age: 58
Setting detail: RECURRING SERIES
End: 2023-05-18
Payer: COMMERCIAL

## 2023-05-23 ENCOUNTER — HOSPITAL ENCOUNTER (OUTPATIENT)
Dept: PHYSICAL THERAPY | Age: 58
Setting detail: RECURRING SERIES
Discharge: HOME | End: 2023-05-26
Payer: COMMERCIAL

## 2023-05-23 PROCEDURE — G0283 ELEC STIM OTHER THAN WOUND: HCPCS

## 2023-05-23 PROCEDURE — 97110 THERAPEUTIC EXERCISES: CPT

## 2023-05-23 ASSESSMENT — PAIN SCALES - GENERAL: PAINLEVEL_OUTOF10: 4

## 2023-05-23 NOTE — PROGRESS NOTES
[address]  : 1965  Primary: [address])  Secondary:  [address] @ [address] [facility] [address]  Phone: [phone]  Fax: [phone] Plan Frequency: 1-2 visits per week for 8 weeks    Plan of Care/Certification Expiration Date: 23 (Start of POC 5/3/2023)      PT Visit Info:  Plan Frequency: 1-2 visits per week for 8 weeks  Plan of Care/Certification Expiration Date: 23 (Start of POC 5/3/2023)      Visit Count:  14    OUTPATIENT PHYSICAL THERAPY:OP NOTE TYPE: Treatment Note 2023       Episode  }Appt Desk             Treatment Diagnosis:  Facet arthropathy, lumbar radiculopathy (M54.16)  Lumbar disc herniation of lumbar region (M48.061)  Low back pain (M54.5)  Right foot drop (M21.371)  Muscle weakness generalized (M62.81)  Difficulty walking, not elsewhere classified (R26.2)  Medical/Referring Diagnosis:  Lumbar radiculopathy [M54.16]  Foraminal stenosis of lumbar region [M48.061]  Lumbar disc herniation with radiculopathy [M51.16]  Spondylolisthesis of lumbar region [M43.16]  Right leg weakness [R29.898]  Right foot drop [M21.371]  Facet arthropathy, lumbar [B49.830]  Referring Physician:  Meg Rogel MD Orders:  PT Eval and Treat  Date of Onset:  Onset Date: 23 (Exacerbation of low back and radicular RLE symptoms.)     Allergies:   Aspirin, Hydrocodone-acetaminophen, and Morphine  Restrictions/Precautions:  Restrictions/Precautions: Fall Risk  No data recorded     Interventions Planned (Treatment may consist of any combination of the following):    Current Treatment Recommendations: Strengthening; ROM; Balance training; Functional mobility training; Transfer training; ADL/Self-care training;  Endurance training; Gait training; Stair training; Neuromuscular re-education; Manual; Pain management; Home exercise program; Safety education & training; Patient/Caregiver education &

## 2023-05-25 ENCOUNTER — HOSPITAL ENCOUNTER (OUTPATIENT)
Dept: PHYSICAL THERAPY | Age: 58
Setting detail: RECURRING SERIES
Discharge: HOME | End: 2023-05-28
Payer: COMMERCIAL

## 2023-05-25 PROCEDURE — 97110 THERAPEUTIC EXERCISES: CPT

## 2023-05-25 PROCEDURE — G0283 ELEC STIM OTHER THAN WOUND: HCPCS

## 2023-05-25 ASSESSMENT — PAIN SCALES - GENERAL: PAINLEVEL_OUTOF10: 2

## 2023-05-30 ENCOUNTER — TELEPHONE (OUTPATIENT)
Dept: DIABETES SERVICES | Age: 58
End: 2023-05-30

## 2023-05-30 NOTE — TELEPHONE ENCOUNTER
No show for DM Education D1 today. Called and she wants to call us back to schedule. Defer her for one month.

## 2023-05-31 ENCOUNTER — FOLLOWUP TELEPHONE ENCOUNTER (OUTPATIENT)
Dept: DIABETES SERVICES | Age: 58
End: 2023-05-31

## 2023-06-01 ENCOUNTER — HOSPITAL ENCOUNTER (OUTPATIENT)
Dept: PHYSICAL THERAPY | Age: 58
Setting detail: RECURRING SERIES
End: 2023-06-01
Payer: COMMERCIAL

## 2023-06-01 ENCOUNTER — TELEMEDICINE (OUTPATIENT)
Dept: INTERNAL MEDICINE CLINIC | Facility: CLINIC | Age: 58
End: 2023-06-01
Payer: COMMERCIAL

## 2023-06-01 DIAGNOSIS — E55.9 VITAMIN D DEFICIENCY: ICD-10-CM

## 2023-06-01 DIAGNOSIS — M51.36 DEGENERATIVE DISC DISEASE, LUMBAR: ICD-10-CM

## 2023-06-01 DIAGNOSIS — K21.9 GASTROESOPHAGEAL REFLUX DISEASE, UNSPECIFIED WHETHER ESOPHAGITIS PRESENT: ICD-10-CM

## 2023-06-01 DIAGNOSIS — E11.40 TYPE 2 DIABETES MELLITUS WITH DIABETIC NEUROPATHY, UNSPECIFIED WHETHER LONG TERM INSULIN USE (HCC): ICD-10-CM

## 2023-06-01 DIAGNOSIS — E11.40 TYPE 2 DIABETES MELLITUS WITH DIABETIC NEUROPATHY, WITHOUT LONG-TERM CURRENT USE OF INSULIN (HCC): ICD-10-CM

## 2023-06-01 DIAGNOSIS — F33.0 MAJOR DEPRESSIVE DISORDER, RECURRENT, MILD (HCC): ICD-10-CM

## 2023-06-01 DIAGNOSIS — R79.89 ELEVATED TSH: ICD-10-CM

## 2023-06-01 DIAGNOSIS — E78.2 MIXED HYPERLIPIDEMIA: ICD-10-CM

## 2023-06-01 DIAGNOSIS — I10 ESSENTIAL HYPERTENSION, BENIGN: Primary | ICD-10-CM

## 2023-06-01 DIAGNOSIS — Z00.00 ROUTINE HEALTH MAINTENANCE: ICD-10-CM

## 2023-06-01 DIAGNOSIS — G43.009 MIGRAINE WITHOUT AURA AND WITHOUT STATUS MIGRAINOSUS, NOT INTRACTABLE: ICD-10-CM

## 2023-06-01 PROCEDURE — G8427 DOCREV CUR MEDS BY ELIG CLIN: HCPCS | Performed by: NURSE PRACTITIONER

## 2023-06-01 PROCEDURE — 3017F COLORECTAL CA SCREEN DOC REV: CPT | Performed by: NURSE PRACTITIONER

## 2023-06-01 PROCEDURE — 2022F DILAT RTA XM EVC RTNOPTHY: CPT | Performed by: NURSE PRACTITIONER

## 2023-06-01 PROCEDURE — 99214 OFFICE O/P EST MOD 30 MIN: CPT | Performed by: NURSE PRACTITIONER

## 2023-06-01 PROCEDURE — 3046F HEMOGLOBIN A1C LEVEL >9.0%: CPT | Performed by: NURSE PRACTITIONER

## 2023-06-01 RX ORDER — TRAMADOL HYDROCHLORIDE 50 MG/1
50 TABLET ORAL EVERY 6 HOURS PRN
Qty: 28 TABLET | Refills: 0 | Status: SHIPPED | OUTPATIENT
Start: 2023-06-01 | End: 2023-06-08

## 2023-06-01 RX ORDER — HYDROCHLOROTHIAZIDE 25 MG/1
25 TABLET ORAL DAILY
Qty: 30 TABLET | Refills: 3 | Status: SHIPPED | OUTPATIENT
Start: 2023-06-01

## 2023-06-01 ASSESSMENT — ENCOUNTER SYMPTOMS
SHORTNESS OF BREATH: 0
BACK PAIN: 1

## 2023-06-01 NOTE — PROGRESS NOTES
Creatinine Urine Ratio; Future    Migraine without aura and without status migrainosus, not intractable    Elevated TSH  -     TSH; Future    Vitamin D deficiency  -     Vitamin D 25 Hydroxy; Future    Mixed hyperlipidemia  -     Comprehensive Metabolic Panel; Future  -     Lipid Panel; Future    Type 2 diabetes mellitus with diabetic neuropathy, unspecified whether long term insulin use (HCC)  -     metFORMIN (GLUCOPHAGE) 500 MG tablet; Take 1 tablet by mouth 2 times daily (with meals)    Degenerative disc disease, lumbar  -     traMADol (ULTRAM) 50 MG tablet; Take 1 tablet by mouth every 6 hours as needed for Pain for up to 7 days. Intended supply: 7 days. Take lowest dose possible to manage pain Max Daily Amount: 200 mg    Routine health maintenance  -     CBC with Auto Differential; Future    Reviewed home glucose and blood pressure readings. Discussed goals and will continue to monitor. Diabetes education next week. Can use tramadol sparingly for severe pain and rx provided  On the basis of positive PHQ-9 screening ( ), the following plan was implemented:  Continue Lexapro . Patient will follow-up in 2 month(s) with  me . Medical problems and test results were reviewed with the patient today. FOLLOW UP    Return for CPE with fasting labs. REVIEW OF SYSTEMS    Review of Systems   Constitutional:  Positive for unexpected weight change. Eyes:  Negative for visual disturbance. Respiratory:  Negative for shortness of breath. Cardiovascular:  Negative for chest pain, palpitations and leg swelling. Musculoskeletal:  Positive for back pain. Neurological:  Negative for headaches. Psychiatric/Behavioral:  Positive for dysphoric mood. PHYSICAL EXAM    There were no vitals taken for this visit. Physical Exam  Vitals reviewed. Constitutional:       Appearance: Normal appearance. She is not ill-appearing.    Pulmonary:      Effort: Pulmonary effort is normal.   Neurological:

## 2023-06-02 ENCOUNTER — APPOINTMENT (OUTPATIENT)
Dept: PHYSICAL THERAPY | Age: 58
End: 2023-06-02
Payer: COMMERCIAL

## 2023-06-06 ENCOUNTER — HOSPITAL ENCOUNTER (OUTPATIENT)
Dept: PHYSICAL THERAPY | Age: 58
Setting detail: RECURRING SERIES
Discharge: HOME | End: 2023-06-09
Payer: COMMERCIAL

## 2023-06-06 PROCEDURE — 97110 THERAPEUTIC EXERCISES: CPT

## 2023-06-06 PROCEDURE — G0283 ELEC STIM OTHER THAN WOUND: HCPCS

## 2023-06-06 ASSESSMENT — PAIN SCALES - GENERAL: PAINLEVEL_OUTOF10: 4

## 2023-06-06 NOTE — PROGRESS NOTES
[address]  : 1965  Primary: [address])  Secondary:  [address] @ [address] [facility] [address]  Phone: [phone]  Fax: [phone] Plan Frequency: 1-2 visits per week for 8 weeks    Plan of Care/Certification Expiration Date: 23 (Start of POC 5/3/2023)      PT Visit Info:  Plan Frequency: 1-2 visits per week for 8 weeks  Plan of Care/Certification Expiration Date: 23 (Start of POC 5/3/2023)      Visit Count:  16    OUTPATIENT PHYSICAL THERAPY:OP NOTE TYPE: Treatment Note 2023       Episode  }Appt Desk             Treatment Diagnosis:  Facet arthropathy, lumbar radiculopathy (M54.16)  Lumbar disc herniation of lumbar region (M48.061)  Low back pain (M54.5)  Right foot drop (M21.371)  Muscle weakness generalized (M62.81)  Difficulty walking, not elsewhere classified (R26.2)  Medical/Referring Diagnosis:  Lumbar radiculopathy [M54.16]  Foraminal stenosis of lumbar region [M48.061]  Lumbar disc herniation with radiculopathy [M51.16]  Spondylolisthesis of lumbar region [M43.16]  Right leg weakness [R29.898]  Right foot drop [M21.371]  Facet arthropathy, lumbar [R43.080]  Referring Physician:  Néstor Del Cid MD Orders:  PT Eval and Treat  Date of Onset:  Onset Date: 23 (Exacerbation of low back and radicular RLE symptoms.)     Allergies:   Aspirin, Hydrocodone-acetaminophen, and Morphine  Restrictions/Precautions:  Restrictions/Precautions: Fall Risk  No data recorded     Interventions Planned (Treatment may consist of any combination of the following):    Current Treatment Recommendations: Strengthening; ROM; Balance training; Functional mobility training; Transfer training; ADL/Self-care training;  Endurance training; Gait training; Stair training; Neuromuscular re-education; Manual; Pain management; Home exercise program; Safety education & training; Patient/Caregiver education &

## 2023-06-09 ENCOUNTER — CLINICAL DOCUMENTATION (OUTPATIENT)
Dept: ORTHOPEDIC SURGERY | Age: 58
End: 2023-06-09

## 2023-06-09 ENCOUNTER — FOLLOWUP TELEPHONE ENCOUNTER (OUTPATIENT)
Dept: DIABETES SERVICES | Age: 58
End: 2023-06-09

## 2023-06-09 NOTE — PROGRESS NOTES
Received correspondence from [facility] that there has been no response from patient since 3/6/2023.

## 2023-06-15 ENCOUNTER — HOSPITAL ENCOUNTER (OUTPATIENT)
Dept: PHYSICAL THERAPY | Age: 58
Setting detail: RECURRING SERIES
Discharge: HOME | End: 2023-06-18
Payer: COMMERCIAL

## 2023-06-15 PROCEDURE — 97110 THERAPEUTIC EXERCISES: CPT

## 2023-06-15 PROCEDURE — G0283 ELEC STIM OTHER THAN WOUND: HCPCS

## 2023-06-15 ASSESSMENT — PAIN SCALES - GENERAL: PAINLEVEL_OUTOF10: 2

## 2023-06-20 ENCOUNTER — HOSPITAL ENCOUNTER (OUTPATIENT)
Dept: PHYSICAL THERAPY | Age: 58
Setting detail: RECURRING SERIES
Discharge: HOME | End: 2023-06-23
Payer: COMMERCIAL

## 2023-06-20 PROCEDURE — 97110 THERAPEUTIC EXERCISES: CPT

## 2023-06-20 PROCEDURE — G0283 ELEC STIM OTHER THAN WOUND: HCPCS

## 2023-06-20 ASSESSMENT — PAIN SCALES - GENERAL: PAINLEVEL_OUTOF10: 3

## 2023-06-20 NOTE — PROGRESS NOTES
[address]  : 1965  Primary: [address] [facility] [address] (Commercial)  Secondary:  [address] @ [address] [facility] [place] [address]  Phone: [phone]  Fax: [phone] Plan Frequency: 1-2 visits per week for 8 weeks    Plan of Care/Certification Expiration Date: 23 (Start of POC 5/3/2023)      PT Visit Info:  Plan Frequency: 1-2 visits per week for 8 weeks  Plan of Care/Certification Expiration Date: 23 (Start of POC 5/3/2023)      Visit Count:  20    OUTPATIENT PHYSICAL THERAPY:OP NOTE TYPE: Treatment Note 2023       Episode  }Appt Desk             Treatment Diagnosis:  Facet arthropathy, lumbar radiculopathy (M54.16)  Lumbar disc herniation of lumbar region (M48.061)  Low back pain (M54.5)  Right foot drop (M21.371)  Muscle weakness generalized (M62.81)  Difficulty walking, not elsewhere classified (R26.2)  Medical/Referring Diagnosis:  Lumbar radiculopathy [M54.16]  Foraminal stenosis of lumbar region [M48.061]  Lumbar disc herniation with radiculopathy [M51.16]  Spondylolisthesis of lumbar region [M43.16]  Right leg weakness [R29.898]  Right foot drop [M21.371]  Facet arthropathy, lumbar [O40.397]  Referring Physician:  Shimon Rivera MD Orders:  PT Eval and Treat  Date of Onset:  Onset Date: 23 (Exacerbation of low back and radicular RLE symptoms.)     Allergies:   Aspirin, Hydrocodone-acetaminophen, and Morphine  Restrictions/Precautions:  Restrictions/Precautions: Fall Risk  No data recorded     Interventions Planned (Treatment may consist of any combination of the following):    Current Treatment Recommendations: Strengthening; ROM; Balance training; Functional mobility training; Transfer training; ADL/Self-care training;  Endurance training; Gait training; Stair training; Neuromuscular re-education; Manual; Pain management; Home exercise program; Safety education & training; Patient/Caregiver education &

## 2023-06-22 ENCOUNTER — HOSPITAL ENCOUNTER (OUTPATIENT)
Dept: PHYSICAL THERAPY | Age: 58
Setting detail: RECURRING SERIES
Discharge: HOME | End: 2023-06-25
Payer: COMMERCIAL

## 2023-06-22 PROCEDURE — 97110 THERAPEUTIC EXERCISES: CPT

## 2023-06-22 PROCEDURE — G0283 ELEC STIM OTHER THAN WOUND: HCPCS

## 2023-06-22 ASSESSMENT — PAIN SCALES - GENERAL: PAINLEVEL_OUTOF10: 1

## 2023-06-27 ENCOUNTER — HOSPITAL ENCOUNTER (OUTPATIENT)
Dept: PHYSICAL THERAPY | Age: 58
Setting detail: RECURRING SERIES
Discharge: HOME | End: 2023-06-30
Payer: COMMERCIAL

## 2023-06-27 PROCEDURE — G0283 ELEC STIM OTHER THAN WOUND: HCPCS

## 2023-06-27 PROCEDURE — 97110 THERAPEUTIC EXERCISES: CPT

## 2023-06-27 ASSESSMENT — PAIN SCALES - GENERAL: PAINLEVEL_OUTOF10: 2

## 2023-08-06 DIAGNOSIS — M15.9 OSTEOARTHRITIS OF MULTIPLE JOINTS, UNSPECIFIED OSTEOARTHRITIS TYPE: ICD-10-CM

## 2023-08-07 RX ORDER — CELECOXIB 100 MG/1
CAPSULE ORAL
Qty: 60 CAPSULE | Refills: 0 | Status: SHIPPED | OUTPATIENT
Start: 2023-08-07

## 2023-08-23 DIAGNOSIS — E78.2 MIXED HYPERLIPIDEMIA: ICD-10-CM

## 2023-08-23 DIAGNOSIS — E55.9 VITAMIN D DEFICIENCY: ICD-10-CM

## 2023-08-23 DIAGNOSIS — Z00.00 ROUTINE HEALTH MAINTENANCE: ICD-10-CM

## 2023-08-23 DIAGNOSIS — E11.40 TYPE 2 DIABETES MELLITUS WITH DIABETIC NEUROPATHY, WITHOUT LONG-TERM CURRENT USE OF INSULIN (HCC): ICD-10-CM

## 2023-08-23 DIAGNOSIS — R79.89 ELEVATED TSH: ICD-10-CM

## 2023-08-23 LAB
25(OH)D3 SERPL-MCNC: 35.4 NG/ML (ref 30–100)
ALBUMIN SERPL-MCNC: 4 G/DL (ref 3.5–5)
ALBUMIN/GLOB SERPL: 1.4 (ref 0.4–1.6)
ALP SERPL-CCNC: 109 U/L (ref 50–136)
ALT SERPL-CCNC: 38 U/L (ref 12–65)
ANION GAP SERPL CALC-SCNC: 5 MMOL/L (ref 2–11)
AST SERPL-CCNC: 15 U/L (ref 15–37)
BASOPHILS # BLD: 0.1 K/UL (ref 0–0.2)
BASOPHILS NFR BLD: 1 % (ref 0–2)
BILIRUB SERPL-MCNC: 0.9 MG/DL (ref 0.2–1.1)
BUN SERPL-MCNC: 15 MG/DL (ref 6–23)
CALCIUM SERPL-MCNC: 10.1 MG/DL (ref 8.3–10.4)
CHLORIDE SERPL-SCNC: 110 MMOL/L (ref 101–110)
CHOLEST SERPL-MCNC: 206 MG/DL
CO2 SERPL-SCNC: 28 MMOL/L (ref 21–32)
CREAT SERPL-MCNC: 1 MG/DL (ref 0.6–1)
DIFFERENTIAL METHOD BLD: NORMAL
EOSINOPHIL # BLD: 0.1 K/UL (ref 0–0.8)
EOSINOPHIL NFR BLD: 2 % (ref 0.5–7.8)
ERYTHROCYTE [DISTWIDTH] IN BLOOD BY AUTOMATED COUNT: 13.2 % (ref 11.9–14.6)
EST. AVERAGE GLUCOSE BLD GHB EST-MCNC: 134 MG/DL
GLOBULIN SER CALC-MCNC: 2.8 G/DL (ref 2.8–4.5)
GLUCOSE SERPL-MCNC: 108 MG/DL (ref 65–100)
HBA1C MFR BLD: 6.3 % (ref 4.8–5.6)
HCT VFR BLD AUTO: 43.5 % (ref 35.8–46.3)
HDLC SERPL-MCNC: 46 MG/DL (ref 40–60)
HDLC SERPL: 4.5
HGB BLD-MCNC: 14 G/DL (ref 11.7–15.4)
IMM GRANULOCYTES # BLD AUTO: 0 K/UL (ref 0–0.5)
IMM GRANULOCYTES NFR BLD AUTO: 1 % (ref 0–5)
LDLC SERPL CALC-MCNC: 126.4 MG/DL
LYMPHOCYTES # BLD: 2.5 K/UL (ref 0.5–4.6)
LYMPHOCYTES NFR BLD: 29 % (ref 13–44)
MCH RBC QN AUTO: 29.4 PG (ref 26.1–32.9)
MCHC RBC AUTO-ENTMCNC: 32.2 G/DL (ref 31.4–35)
MCV RBC AUTO: 91.4 FL (ref 82–102)
MONOCYTES # BLD: 0.5 K/UL (ref 0.1–1.3)
MONOCYTES NFR BLD: 6 % (ref 4–12)
NEUTS SEG # BLD: 5.3 K/UL (ref 1.7–8.2)
NEUTS SEG NFR BLD: 61 % (ref 43–78)
NRBC # BLD: 0 K/UL (ref 0–0.2)
PLATELET # BLD AUTO: 343 K/UL (ref 150–450)
PMV BLD AUTO: 10.4 FL (ref 9.4–12.3)
POTASSIUM SERPL-SCNC: 4.7 MMOL/L (ref 3.5–5.1)
PROT SERPL-MCNC: 6.8 G/DL (ref 6.3–8.2)
RBC # BLD AUTO: 4.76 M/UL (ref 4.05–5.2)
SODIUM SERPL-SCNC: 143 MMOL/L (ref 133–143)
TRIGL SERPL-MCNC: 168 MG/DL (ref 35–150)
TSH, 3RD GENERATION: 1.91 UIU/ML (ref 0.36–3.74)
VLDLC SERPL CALC-MCNC: 33.6 MG/DL (ref 6–23)
WBC # BLD AUTO: 8.6 K/UL (ref 4.3–11.1)

## 2023-08-31 ENCOUNTER — OFFICE VISIT (OUTPATIENT)
Dept: INTERNAL MEDICINE CLINIC | Facility: CLINIC | Age: 58
End: 2023-08-31
Payer: COMMERCIAL

## 2023-08-31 VITALS
WEIGHT: 293 LBS | BODY MASS INDEX: 48.82 KG/M2 | DIASTOLIC BLOOD PRESSURE: 88 MMHG | HEIGHT: 65 IN | SYSTOLIC BLOOD PRESSURE: 148 MMHG

## 2023-08-31 DIAGNOSIS — E55.9 VITAMIN D DEFICIENCY: ICD-10-CM

## 2023-08-31 DIAGNOSIS — E78.2 MODERATE MIXED HYPERLIPIDEMIA NOT REQUIRING STATIN THERAPY: ICD-10-CM

## 2023-08-31 DIAGNOSIS — F33.0 MAJOR DEPRESSIVE DISORDER, RECURRENT, MILD (HCC): ICD-10-CM

## 2023-08-31 DIAGNOSIS — F43.9 SITUATIONAL STRESS: ICD-10-CM

## 2023-08-31 DIAGNOSIS — R79.89 ELEVATED TSH: ICD-10-CM

## 2023-08-31 DIAGNOSIS — E11.40 TYPE 2 DIABETES MELLITUS WITH DIABETIC NEUROPATHY, UNSPECIFIED WHETHER LONG TERM INSULIN USE (HCC): ICD-10-CM

## 2023-08-31 DIAGNOSIS — F33.0 DEPRESSION, MAJOR, RECURRENT, MILD (HCC): ICD-10-CM

## 2023-08-31 DIAGNOSIS — F41.9 ANXIETY: ICD-10-CM

## 2023-08-31 DIAGNOSIS — I10 ESSENTIAL HYPERTENSION, BENIGN: Primary | ICD-10-CM

## 2023-08-31 DIAGNOSIS — K21.9 GASTROESOPHAGEAL REFLUX DISEASE WITHOUT ESOPHAGITIS: ICD-10-CM

## 2023-08-31 DIAGNOSIS — E66.01 MORBID OBESITY (HCC): ICD-10-CM

## 2023-08-31 DIAGNOSIS — M15.9 OSTEOARTHRITIS OF MULTIPLE JOINTS, UNSPECIFIED OSTEOARTHRITIS TYPE: ICD-10-CM

## 2023-08-31 PROCEDURE — 3017F COLORECTAL CA SCREEN DOC REV: CPT | Performed by: NURSE PRACTITIONER

## 2023-08-31 PROCEDURE — 2022F DILAT RTA XM EVC RTNOPTHY: CPT | Performed by: NURSE PRACTITIONER

## 2023-08-31 PROCEDURE — G8427 DOCREV CUR MEDS BY ELIG CLIN: HCPCS | Performed by: NURSE PRACTITIONER

## 2023-08-31 PROCEDURE — 1036F TOBACCO NON-USER: CPT | Performed by: NURSE PRACTITIONER

## 2023-08-31 PROCEDURE — G8417 CALC BMI ABV UP PARAM F/U: HCPCS | Performed by: NURSE PRACTITIONER

## 2023-08-31 PROCEDURE — 3044F HG A1C LEVEL LT 7.0%: CPT | Performed by: NURSE PRACTITIONER

## 2023-08-31 PROCEDURE — 99214 OFFICE O/P EST MOD 30 MIN: CPT | Performed by: NURSE PRACTITIONER

## 2023-08-31 PROCEDURE — 3077F SYST BP >= 140 MM HG: CPT | Performed by: NURSE PRACTITIONER

## 2023-08-31 PROCEDURE — 3079F DIAST BP 80-89 MM HG: CPT | Performed by: NURSE PRACTITIONER

## 2023-08-31 RX ORDER — ACETAMINOPHEN 160 MG
2000 TABLET,DISINTEGRATING ORAL 2 TIMES DAILY
COMMUNITY

## 2023-08-31 RX ORDER — BIOTIN 10 MG
3000 TABLET ORAL DAILY
COMMUNITY

## 2023-08-31 RX ORDER — VITAMIN E 268 MG
400 CAPSULE ORAL DAILY
COMMUNITY

## 2023-08-31 RX ORDER — HYDROCHLOROTHIAZIDE 25 MG/1
25 TABLET ORAL DAILY
Qty: 30 TABLET | Refills: 11 | Status: SHIPPED | OUTPATIENT
Start: 2023-08-31

## 2023-08-31 RX ORDER — LISINOPRIL 30 MG/1
30 TABLET ORAL DAILY
Qty: 30 TABLET | Refills: 5 | Status: SHIPPED | OUTPATIENT
Start: 2023-08-31

## 2023-08-31 RX ORDER — ESCITALOPRAM OXALATE 10 MG/1
15 TABLET ORAL DAILY
Qty: 30 TABLET | Refills: 11 | Status: SHIPPED | OUTPATIENT
Start: 2023-08-31

## 2023-08-31 RX ORDER — CELECOXIB 100 MG/1
100 CAPSULE ORAL 2 TIMES DAILY
Qty: 60 CAPSULE | Refills: 11 | Status: SHIPPED | OUTPATIENT
Start: 2023-08-31

## 2023-08-31 RX ORDER — GABAPENTIN 300 MG/1
300 CAPSULE ORAL 3 TIMES DAILY
Qty: 90 CAPSULE | Refills: 11 | Status: SHIPPED | OUTPATIENT
Start: 2023-08-31 | End: 2024-08-25

## 2023-08-31 RX ORDER — VITAMIN B COMPLEX
1 CAPSULE ORAL DAILY
COMMUNITY

## 2023-08-31 RX ORDER — PRASTERONE (DHEA) 50 MG
50 TABLET ORAL DAILY
COMMUNITY

## 2023-08-31 RX ORDER — FERROUS SULFATE 325(65) MG
325 TABLET ORAL
COMMUNITY

## 2023-08-31 RX ORDER — OMEPRAZOLE 20 MG/1
20 CAPSULE, DELAYED RELEASE ORAL DAILY
Qty: 30 CAPSULE | Refills: 11 | Status: SHIPPED | OUTPATIENT
Start: 2023-08-31

## 2023-08-31 SDOH — ECONOMIC STABILITY: HOUSING INSECURITY
IN THE LAST 12 MONTHS, WAS THERE A TIME WHEN YOU DID NOT HAVE A STEADY PLACE TO SLEEP OR SLEPT IN A SHELTER (INCLUDING NOW)?: NO

## 2023-08-31 SDOH — ECONOMIC STABILITY: FOOD INSECURITY: WITHIN THE PAST 12 MONTHS, YOU WORRIED THAT YOUR FOOD WOULD RUN OUT BEFORE YOU GOT MONEY TO BUY MORE.: NEVER TRUE

## 2023-08-31 SDOH — ECONOMIC STABILITY: FOOD INSECURITY: WITHIN THE PAST 12 MONTHS, THE FOOD YOU BOUGHT JUST DIDN'T LAST AND YOU DIDN'T HAVE MONEY TO GET MORE.: NEVER TRUE

## 2023-08-31 SDOH — ECONOMIC STABILITY: INCOME INSECURITY: HOW HARD IS IT FOR YOU TO PAY FOR THE VERY BASICS LIKE FOOD, HOUSING, MEDICAL CARE, AND HEATING?: SOMEWHAT HARD

## 2023-08-31 ASSESSMENT — ENCOUNTER SYMPTOMS
SHORTNESS OF BREATH: 0
BACK PAIN: 0

## 2023-08-31 ASSESSMENT — PATIENT HEALTH QUESTIONNAIRE - PHQ9
9. THOUGHTS THAT YOU WOULD BE BETTER OFF DEAD, OR OF HURTING YOURSELF: 0
SUM OF ALL RESPONSES TO PHQ QUESTIONS 1-9: 8
6. FEELING BAD ABOUT YOURSELF - OR THAT YOU ARE A FAILURE OR HAVE LET YOURSELF OR YOUR FAMILY DOWN: 1
3. TROUBLE FALLING OR STAYING ASLEEP: 1
5. POOR APPETITE OR OVEREATING: 1
2. FEELING DOWN, DEPRESSED OR HOPELESS: 1
SUM OF ALL RESPONSES TO PHQ QUESTIONS 1-9: 8
SUM OF ALL RESPONSES TO PHQ QUESTIONS 1-9: 8
SUM OF ALL RESPONSES TO PHQ9 QUESTIONS 1 & 2: 2
7. TROUBLE CONCENTRATING ON THINGS, SUCH AS READING THE NEWSPAPER OR WATCHING TELEVISION: 1
1. LITTLE INTEREST OR PLEASURE IN DOING THINGS: 1
8. MOVING OR SPEAKING SO SLOWLY THAT OTHER PEOPLE COULD HAVE NOTICED. OR THE OPPOSITE, BEING SO FIGETY OR RESTLESS THAT YOU HAVE BEEN MOVING AROUND A LOT MORE THAN USUAL: 1
10. IF YOU CHECKED OFF ANY PROBLEMS, HOW DIFFICULT HAVE THESE PROBLEMS MADE IT FOR YOU TO DO YOUR WORK, TAKE CARE OF THINGS AT HOME, OR GET ALONG WITH OTHER PEOPLE: 1
SUM OF ALL RESPONSES TO PHQ QUESTIONS 1-9: 8
4. FEELING TIRED OR HAVING LITTLE ENERGY: 1

## 2023-08-31 NOTE — PROGRESS NOTES
PROGRESS NOTE      Chief Complaint   Patient presents with    Diabetes     Pt here for f/u     Cholesterol Problem    Hypertension    Results     Labs were done 8/23/23    Panic Attack     Pt stated she had a panic attack a few times the past month  (pt's son dx with cancer)     Medication Refill       HPI    Obesity: 30+ pound weight loss since last year with healthy diet choices. Hypertension: Monitoring blood pressure at home with readings generally 140s/80s. Lisinopril 20 mg and hctz 25 mg - compliant and tolerant     Diabetes:Metformin to 500 bid. Tolerating. A1C now 6.3%     Cholesterol: Intolerant of Crestor and Lipitor. LDL improved from 170s-120s with diet changes. Headaches: Daily tension headache for years. Fioricet prn with benefit. Takes several days per week. Depression: Also diagnosed with ADHD. Increased situational stressors with son in 35s diagnosed with metastatic testicular cancer. Other son is drug abuser. Daughter and her  living with her. Would like to see therapist but had difficult time getting in to see someone. Continues Lexapro 10 mg      Vitamin D deficiency: Vitamin D 4000 daily     Dry eyes: Restasis    OAL Celebrex 100 mg twice daily with benefit. GERD: Omeprazole with beneit. Requesting refill. Past Medical History, Past Surgical History, Family history, Social History, and Medications were all reviewed and updated as necessary.      Current Outpatient Medications   Medication Sig Dispense Refill    Cyanocobalamin (VITAMIN B-12) 3000 MCG SUBL Place 3,000 mcg under the tongue daily      DHEA 50 MG TABS Take 50 mg by mouth daily      vitamin E 400 UNIT capsule Take 1 capsule by mouth daily      ferrous sulfate (IRON 325) 325 (65 Fe) MG tablet Take 1 tablet by mouth daily (with breakfast)      Cholecalciferol (VITAMIN D3) 50 MCG (2000 UT) CAPS Take 1 capsule by mouth 2 times daily      b complex vitamins capsule Take 1 capsule by mouth daily

## 2024-01-07 ASSESSMENT — PATIENT HEALTH QUESTIONNAIRE - PHQ9
5. POOR APPETITE OR OVEREATING: SEVERAL DAYS
10. IF YOU CHECKED OFF ANY PROBLEMS, HOW DIFFICULT HAVE THESE PROBLEMS MADE IT FOR YOU TO DO YOUR WORK, TAKE CARE OF THINGS AT HOME, OR GET ALONG WITH OTHER PEOPLE: NOT DIFFICULT AT ALL
5. POOR APPETITE OR OVEREATING: 1
4. FEELING TIRED OR HAVING LITTLE ENERGY: NOT AT ALL
1. LITTLE INTEREST OR PLEASURE IN DOING THINGS: 0
6. FEELING BAD ABOUT YOURSELF - OR THAT YOU ARE A FAILURE OR HAVE LET YOURSELF OR YOUR FAMILY DOWN: 0
SUM OF ALL RESPONSES TO PHQ9 QUESTIONS 1 & 2: 0
4. FEELING TIRED OR HAVING LITTLE ENERGY: 0
1. LITTLE INTEREST OR PLEASURE IN DOING THINGS: NOT AT ALL
SUM OF ALL RESPONSES TO PHQ QUESTIONS 1-9: 1
3. TROUBLE FALLING OR STAYING ASLEEP: 0
7. TROUBLE CONCENTRATING ON THINGS, SUCH AS READING THE NEWSPAPER OR WATCHING TELEVISION: NOT AT ALL
2. FEELING DOWN, DEPRESSED OR HOPELESS: NOT AT ALL
10. IF YOU CHECKED OFF ANY PROBLEMS, HOW DIFFICULT HAVE THESE PROBLEMS MADE IT FOR YOU TO DO YOUR WORK, TAKE CARE OF THINGS AT HOME, OR GET ALONG WITH OTHER PEOPLE: 0
SUM OF ALL RESPONSES TO PHQ QUESTIONS 1-9: 1
9. THOUGHTS THAT YOU WOULD BE BETTER OFF DEAD, OR OF HURTING YOURSELF: NOT AT ALL
6. FEELING BAD ABOUT YOURSELF - OR THAT YOU ARE A FAILURE OR HAVE LET YOURSELF OR YOUR FAMILY DOWN: NOT AT ALL
2. FEELING DOWN, DEPRESSED OR HOPELESS: 0
9. THOUGHTS THAT YOU WOULD BE BETTER OFF DEAD, OR OF HURTING YOURSELF: 0
SUM OF ALL RESPONSES TO PHQ QUESTIONS 1-9: 1
SUM OF ALL RESPONSES TO PHQ QUESTIONS 1-9: 1
7. TROUBLE CONCENTRATING ON THINGS, SUCH AS READING THE NEWSPAPER OR WATCHING TELEVISION: 0
8. MOVING OR SPEAKING SO SLOWLY THAT OTHER PEOPLE COULD HAVE NOTICED. OR THE OPPOSITE, BEING SO FIGETY OR RESTLESS THAT YOU HAVE BEEN MOVING AROUND A LOT MORE THAN USUAL: 0
8. MOVING OR SPEAKING SO SLOWLY THAT OTHER PEOPLE COULD HAVE NOTICED. OR THE OPPOSITE - BEING SO FIDGETY OR RESTLESS THAT YOU HAVE BEEN MOVING AROUND A LOT MORE THAN USUAL: NOT AT ALL
SUM OF ALL RESPONSES TO PHQ QUESTIONS 1-9: 1
3. TROUBLE FALLING OR STAYING ASLEEP: NOT AT ALL

## 2024-01-08 ENCOUNTER — OFFICE VISIT (OUTPATIENT)
Dept: INTERNAL MEDICINE CLINIC | Facility: CLINIC | Age: 59
End: 2024-01-08
Payer: COMMERCIAL

## 2024-01-08 VITALS — DIASTOLIC BLOOD PRESSURE: 92 MMHG | OXYGEN SATURATION: 99 % | HEART RATE: 76 BPM | SYSTOLIC BLOOD PRESSURE: 144 MMHG

## 2024-01-08 DIAGNOSIS — G43.009 MIGRAINE WITHOUT AURA AND WITHOUT STATUS MIGRAINOSUS, NOT INTRACTABLE: ICD-10-CM

## 2024-01-08 DIAGNOSIS — I10 ESSENTIAL HYPERTENSION, BENIGN: ICD-10-CM

## 2024-01-08 DIAGNOSIS — E78.2 MODERATE MIXED HYPERLIPIDEMIA NOT REQUIRING STATIN THERAPY: ICD-10-CM

## 2024-01-08 DIAGNOSIS — R79.89 ELEVATED TSH: ICD-10-CM

## 2024-01-08 DIAGNOSIS — E11.40 TYPE 2 DIABETES MELLITUS WITH DIABETIC NEUROPATHY, WITHOUT LONG-TERM CURRENT USE OF INSULIN (HCC): Primary | ICD-10-CM

## 2024-01-08 DIAGNOSIS — K21.9 GASTROESOPHAGEAL REFLUX DISEASE, UNSPECIFIED WHETHER ESOPHAGITIS PRESENT: ICD-10-CM

## 2024-01-08 DIAGNOSIS — E55.9 VITAMIN D DEFICIENCY: ICD-10-CM

## 2024-01-08 DIAGNOSIS — E66.01 MORBID OBESITY WITH BMI OF 50.0-59.9, ADULT (HCC): ICD-10-CM

## 2024-01-08 PROBLEM — K59.01 SLOW TRANSIT CONSTIPATION: Status: RESOLVED | Noted: 2017-06-14 | Resolved: 2024-01-08

## 2024-01-08 PROCEDURE — 3046F HEMOGLOBIN A1C LEVEL >9.0%: CPT | Performed by: NURSE PRACTITIONER

## 2024-01-08 PROCEDURE — G8417 CALC BMI ABV UP PARAM F/U: HCPCS | Performed by: NURSE PRACTITIONER

## 2024-01-08 PROCEDURE — G8484 FLU IMMUNIZE NO ADMIN: HCPCS | Performed by: NURSE PRACTITIONER

## 2024-01-08 PROCEDURE — G8427 DOCREV CUR MEDS BY ELIG CLIN: HCPCS | Performed by: NURSE PRACTITIONER

## 2024-01-08 PROCEDURE — 3077F SYST BP >= 140 MM HG: CPT | Performed by: NURSE PRACTITIONER

## 2024-01-08 PROCEDURE — 2022F DILAT RTA XM EVC RTNOPTHY: CPT | Performed by: NURSE PRACTITIONER

## 2024-01-08 PROCEDURE — 3080F DIAST BP >= 90 MM HG: CPT | Performed by: NURSE PRACTITIONER

## 2024-01-08 PROCEDURE — 1036F TOBACCO NON-USER: CPT | Performed by: NURSE PRACTITIONER

## 2024-01-08 PROCEDURE — 3017F COLORECTAL CA SCREEN DOC REV: CPT | Performed by: NURSE PRACTITIONER

## 2024-01-08 PROCEDURE — 99214 OFFICE O/P EST MOD 30 MIN: CPT | Performed by: NURSE PRACTITIONER

## 2024-01-08 RX ORDER — LISINOPRIL 30 MG/1
30 TABLET ORAL DAILY
Qty: 30 TABLET | Refills: 11 | Status: SHIPPED | OUTPATIENT
Start: 2024-01-08

## 2024-01-08 RX ORDER — HYDROCHLOROTHIAZIDE 25 MG/1
25 TABLET ORAL DAILY
Qty: 30 TABLET | Refills: 11 | Status: SHIPPED | OUTPATIENT
Start: 2024-01-08

## 2024-01-08 ASSESSMENT — ENCOUNTER SYMPTOMS
SHORTNESS OF BREATH: 0
BACK PAIN: 0

## 2024-01-09 NOTE — PROGRESS NOTES
(PRINIVIL;ZESTRIL) 30 MG tablet; Take 1 tablet by mouth daily  -     hydroCHLOROthiazide (HYDRODIURIL) 25 MG tablet; Take 1 tablet by mouth daily  -     Urinalysis, Micro; Future    Vitamin D deficiency    Moderate mixed hyperlipidemia not requiring statin therapy  -     Comprehensive Metabolic Panel; Future  -     Lipid Panel; Future    Elevated TSH  -     TSH; Future    Gastroesophageal reflux disease, unspecified whether esophagitis present    Morbid obesity with BMI of 50.0-59.9, adult (HCC)    Migraine without aura and without status migrainosus, not intractable    Advised that she resume Lexapro with history of recurrent major depressive disorder and situational stressors. Agreeable.   Resume hctz and discussed role in management of hypertension. Goal under 130/80. Will monitor at home and follow up for bp check in 2 months.   Return for fasting labs prior to follow up.  Encouraged for return to [employer].    Medical problems and test results were reviewed with the patient today.     FOLLOW UP    Return for Fasting labs and visit March.       REVIEW OF SYSTEMS    Review of Systems   Constitutional:  Positive for unexpected weight change.   Eyes:  Negative for visual disturbance.   Respiratory:  Negative for shortness of breath.    Cardiovascular:  Negative for chest pain, palpitations and leg swelling.   Musculoskeletal:  Negative for back pain.   Neurological:  Negative for headaches.   Psychiatric/Behavioral:  Negative for dysphoric mood. The patient is not nervous/anxious.        PHYSICAL EXAM    BP (!) 144/92 (Site: Left Upper Arm, Position: Sitting, Cuff Size: Large Adult)   Pulse 76   SpO2 99%      Physical Exam  Vitals and nursing note reviewed.   Constitutional:       Appearance: Normal appearance. She is not ill-appearing.   Cardiovascular:      Rate and Rhythm: Normal rate and regular rhythm.   Pulmonary:      Effort: Pulmonary effort is normal.      Breath sounds: Normal breath sounds.

## 2024-02-12 ENCOUNTER — OFFICE VISIT (OUTPATIENT)
Dept: ORTHOPEDIC SURGERY | Age: 59
End: 2024-02-12
Payer: COMMERCIAL

## 2024-02-12 DIAGNOSIS — M48.062 LUMBAR STENOSIS WITH NEUROGENIC CLAUDICATION: ICD-10-CM

## 2024-02-12 DIAGNOSIS — M54.50 LOW BACK PAIN, UNSPECIFIED BACK PAIN LATERALITY, UNSPECIFIED CHRONICITY, UNSPECIFIED WHETHER SCIATICA PRESENT: ICD-10-CM

## 2024-02-12 DIAGNOSIS — M43.16 SPONDYLOLISTHESIS OF LUMBAR REGION: ICD-10-CM

## 2024-02-12 DIAGNOSIS — M47.816 FACET ARTHROPATHY, LUMBAR: ICD-10-CM

## 2024-02-12 DIAGNOSIS — M54.2 NECK PAIN: Primary | ICD-10-CM

## 2024-02-12 DIAGNOSIS — R20.2 BILATERAL NUMBNESS AND TINGLING OF ARMS AND LEGS: ICD-10-CM

## 2024-02-12 DIAGNOSIS — R20.0 BILATERAL NUMBNESS AND TINGLING OF ARMS AND LEGS: ICD-10-CM

## 2024-02-12 DIAGNOSIS — M54.12 CERVICAL RADICULOPATHY: ICD-10-CM

## 2024-02-12 DIAGNOSIS — M50.30 DDD (DEGENERATIVE DISC DISEASE), CERVICAL: ICD-10-CM

## 2024-02-12 PROCEDURE — 3017F COLORECTAL CA SCREEN DOC REV: CPT | Performed by: PHYSICIAN ASSISTANT

## 2024-02-12 PROCEDURE — G8484 FLU IMMUNIZE NO ADMIN: HCPCS | Performed by: PHYSICIAN ASSISTANT

## 2024-02-12 PROCEDURE — G8428 CUR MEDS NOT DOCUMENT: HCPCS | Performed by: PHYSICIAN ASSISTANT

## 2024-02-12 PROCEDURE — G8417 CALC BMI ABV UP PARAM F/U: HCPCS | Performed by: PHYSICIAN ASSISTANT

## 2024-02-12 PROCEDURE — 99214 OFFICE O/P EST MOD 30 MIN: CPT | Performed by: PHYSICIAN ASSISTANT

## 2024-02-12 PROCEDURE — 1036F TOBACCO NON-USER: CPT | Performed by: PHYSICIAN ASSISTANT

## 2024-02-12 NOTE — PROGRESS NOTES
A Referral to Dr. Tre Parker has been placed. A referral to PT has been ordered of the Cervical spine.   
conversant  MSK:  Examination of the lumbar spine reveals no sagittal or coronal imbalance   There is mild tenderness to palpation along the spinous processes and paraspinal musculature.   The patient ambulates with a normal gait.    ROM of bilateral hip(s) reveals no irritability.   NEURO:  Cranial nerves grossly intact.  No motor deficits.    Ankle jerk is negative for clonus    Reflexes   Right Left   Quadriceps (L4) 2 2   Achilles (S1) 2 2     Strength testing in the lower extremity reveals the following based on the 5 point grading scale:     HF (L2) H Ab (L5) KE (L3/4) ADF (L4) EHL (L5) A Ev (S1) APF (S1)   Right 5 5 5 5 5 5 5   Left 5 5 5 5 5 5 5     PSYCH:  Alert and oriented X 3.  Appropriate affect.  Intact judgment and insight.     CERVICAL SPINE:  Inspection of the neck reveals no evidence of rash or skin lesion.  Examination of the cervical spine reveals no evidence of sagittal or coronal plane deformity, no palpable tenderness or step deformity, and full ROM  Spurling's sign is negative to the bilateral  side for reproduction of radicular symptoms.    Sensory testing reveals decreased sensation bilateral forearms and hands no specific dermatomal pattern.  Reflexes     Right Left   Biceps (C5) 2 2   Brachio radialis (C6) 2 2    Triceps (C7) 2 2     Arreguin's is negative       Inverted radial reflex is negative      Tinel's and Siva testing over the cubital and carpal tunnels do not reproduce the symptoms.    Shoulder examination is not consistent with adhesive capsulitis or acute rotator cuff tendinitis.    Strength testing in the upper extremity reveals the following based on the 5 point grading scale:     Delt(C5) Bicep(C6) WE(C6) Tricep (C7) WF(C7) (C8) Int (T1)   Right 5 5 5 5 5 5 5   Left 5 5 5 5 5 5 5     Pulses are palpable over bilateral radial arteries.        Radiographic Studies:     Independent interpretation of  AP, lateral cervical spine February 12, 2024 reveals normal alignment on

## 2024-02-27 NOTE — THERAPY EVALUATION
Re-education/Strengthening, Pain Management, Positioning, Modalities:,   Heat/Cold,   Mechanical Traction,   Ultrasound, and   E-stim - unattended, Range of Motion (ROM), Return to Work-Related Activiity, Therapeutic Activites, Therapeutic Exercise/Strengthening, Transfer Training, Equipment Evaluation, Education, and Procurement, Patient/Caregiver Education & Training, Safety Education and Training, ADL/Self-Care Training, IADL Training, and Dry Needling   Goals: (Goals have been discussed and agreed upon with patient.)  Short-Term Functional Goals: Time Frame: 2/28/2024 to 3/26/2024  Patient demonstrates independence with home exercise program without verbal cueing provided by therapist.  Pt will report worst pain 3/10 or less at rest in order to return to unrestricted prolonged sitting 30 mins or greater.  Pt will demonstrate independent supine to sit transfer using log roll technique in order to improve independence and safety with functional transfers.  Pt will report unrestricted sleeping through night 75% of week in order to progress towards sleeping habits consistent with prior level of function.  Pt will demonstrate passive cervical rotation ROM 55 dgs or greater bilaterally in order to facilitate progression towards independent head turns.  Discharge Goals: Time Frame: 2/28/2024 to 4/23/2024  Pt will demonstrate active cervical rotation ROM 55 dgs or greater bilaterally in order to return to independent head turns including checking blind spots while driving.  Pt will demonstrate  strength progression to 30 lbs or greater left hand in order to improve tolerance to functional activities including opening tight jars.  Pt will report absence of BUE numbness and tingling symptoms over previous 1 week.  NDI score of 12/50 or less in order to demonstrate overall functional improvement.       Outcome Measure:   Tool Used: Neck Disability Index (NDI)  Score:  Initial: 22/50  Most Recent: X/50 (Date: -- )

## 2024-02-28 ENCOUNTER — TELEPHONE (OUTPATIENT)
Dept: ORTHOPEDIC SURGERY | Age: 59
End: 2024-02-28

## 2024-02-28 ENCOUNTER — HOSPITAL ENCOUNTER (OUTPATIENT)
Dept: PHYSICAL THERAPY | Age: 59
Setting detail: RECURRING SERIES
Discharge: HOME | End: 2024-03-02
Payer: COMMERCIAL

## 2024-02-28 DIAGNOSIS — M54.12 RADICULOPATHY, CERVICAL REGION: ICD-10-CM

## 2024-02-28 DIAGNOSIS — R20.0 BILATERAL NUMBNESS AND TINGLING OF ARMS AND LEGS: ICD-10-CM

## 2024-02-28 DIAGNOSIS — M54.2 NECK PAIN: Primary | ICD-10-CM

## 2024-02-28 DIAGNOSIS — M50.30 DDD (DEGENERATIVE DISC DISEASE), CERVICAL: ICD-10-CM

## 2024-02-28 DIAGNOSIS — M62.81 MUSCLE WEAKNESS (GENERALIZED): ICD-10-CM

## 2024-02-28 DIAGNOSIS — R20.2 BILATERAL NUMBNESS AND TINGLING OF ARMS AND LEGS: ICD-10-CM

## 2024-02-28 DIAGNOSIS — R29.3 ABNORMAL POSTURE: ICD-10-CM

## 2024-02-28 PROCEDURE — 97140 MANUAL THERAPY 1/> REGIONS: CPT

## 2024-02-28 PROCEDURE — 97162 PT EVAL MOD COMPLEX 30 MIN: CPT

## 2024-02-28 PROCEDURE — 97110 THERAPEUTIC EXERCISES: CPT

## 2024-02-28 ASSESSMENT — PAIN SCALES - GENERAL: PAINLEVEL_OUTOF10: 5

## 2024-02-28 NOTE — TELEPHONE ENCOUNTER
Left a message that Dr. Parker's office has been trying to contact her to make an appointment but have not been successful, and they are going to close the referral.  I instructed her to call back if she's interested in resending the referral.

## 2024-03-07 ENCOUNTER — HOSPITAL ENCOUNTER (OUTPATIENT)
Dept: PHYSICAL THERAPY | Age: 59
Setting detail: RECURRING SERIES
End: 2024-03-07
Payer: COMMERCIAL

## 2024-03-07 NOTE — PROGRESS NOTES
Physical Therapy  [facility]  Date: 3/7/2024    Patient called and cancelled appointment for today due to hurting her back per pt report.      REBECA GonsalvesT

## 2024-03-11 ENCOUNTER — HOSPITAL ENCOUNTER (OUTPATIENT)
Dept: PHYSICAL THERAPY | Age: 59
Setting detail: RECURRING SERIES
Discharge: HOME | End: 2024-03-14
Payer: COMMERCIAL

## 2024-03-11 DIAGNOSIS — R79.89 ELEVATED TSH: ICD-10-CM

## 2024-03-11 DIAGNOSIS — I10 ESSENTIAL HYPERTENSION, BENIGN: ICD-10-CM

## 2024-03-11 DIAGNOSIS — E11.40 TYPE 2 DIABETES MELLITUS WITH DIABETIC NEUROPATHY, WITHOUT LONG-TERM CURRENT USE OF INSULIN (HCC): ICD-10-CM

## 2024-03-11 DIAGNOSIS — E78.2 MODERATE MIXED HYPERLIPIDEMIA NOT REQUIRING STATIN THERAPY: ICD-10-CM

## 2024-03-11 LAB
ALBUMIN SERPL-MCNC: 3.9 G/DL (ref 3.5–5)
ALBUMIN/GLOB SERPL: 1.2 (ref 0.4–1.6)
ALP SERPL-CCNC: 93 U/L (ref 50–136)
ALT SERPL-CCNC: 50 U/L (ref 12–65)
ANION GAP SERPL CALC-SCNC: 6 MMOL/L (ref 2–11)
AST SERPL-CCNC: 22 U/L (ref 15–37)
BACTERIA URNS QL MICRO: ABNORMAL /HPF
BILIRUB SERPL-MCNC: 1 MG/DL (ref 0.2–1.1)
BUN SERPL-MCNC: 20 MG/DL (ref 6–23)
CALCIUM SERPL-MCNC: 10.2 MG/DL (ref 8.3–10.4)
CASTS URNS QL MICRO: ABNORMAL /LPF (ref 0–2)
CHLORIDE SERPL-SCNC: 106 MMOL/L (ref 103–113)
CHOLEST SERPL-MCNC: 205 MG/DL
CO2 SERPL-SCNC: 28 MMOL/L (ref 21–32)
CREAT SERPL-MCNC: 1.1 MG/DL (ref 0.6–1)
EPI CELLS #/AREA URNS HPF: ABNORMAL /HPF (ref 0–5)
GLOBULIN SER CALC-MCNC: 3.2 G/DL (ref 2.8–4.5)
GLUCOSE SERPL-MCNC: 112 MG/DL (ref 65–100)
HDLC SERPL-MCNC: 41 MG/DL (ref 40–60)
HDLC SERPL: 5
LDLC SERPL CALC-MCNC: 126.8 MG/DL
POTASSIUM SERPL-SCNC: 3.9 MMOL/L (ref 3.5–5.1)
PROT SERPL-MCNC: 7.1 G/DL (ref 6.3–8.2)
RBC #/AREA URNS HPF: ABNORMAL /HPF (ref 0–5)
SODIUM SERPL-SCNC: 140 MMOL/L (ref 136–146)
TRIGL SERPL-MCNC: 186 MG/DL (ref 35–150)
TSH, 3RD GENERATION: 1.82 UIU/ML (ref 0.36–3.74)
VLDLC SERPL CALC-MCNC: 37.2 MG/DL (ref 6–23)
WBC URNS QL MICRO: ABNORMAL /HPF (ref 0–4)

## 2024-03-11 PROCEDURE — 97110 THERAPEUTIC EXERCISES: CPT

## 2024-03-11 PROCEDURE — 97140 MANUAL THERAPY 1/> REGIONS: CPT

## 2024-03-11 ASSESSMENT — PAIN SCALES - GENERAL: PAINLEVEL_OUTOF10: 6

## 2024-03-11 NOTE — PROGRESS NOTES
Audrey Gage  : 1965  Primary: [facility] ([facility])  Secondary:  [facility] [place]  [address]rianna MCCURDY  [facility] [address]  Phone: [phone]  Fax: [phone] Plan Frequency: 2 visit per week for 8 weeks (With plan to decrease frequency to 1 visit per week as symptoms and functional progression allow.)    Plan of Care/Certification Expiration Date: 24 (Start of POC 2024)        Plan of Care/Certification Expiration Date:  Plan of Care/Certification Expiration Date: 24 (Start of POC 2024)    Frequency/Duration:   Plan Frequency: 2 visit per week for 8 weeks (With plan to decrease frequency to 1 visit per week as symptoms and functional progression allow.)      Time In/Out:   Time In: 1106  Time Out: 1206      PT Visit Info:         Visit Count:  2    OUTPATIENT PHYSICAL THERAPY:   Treatment Note 3/11/2024       Episode  (Neck pain)               Treatment Diagnosis:    Neck pain  DDD (degenerative disc disease), cervical  Radiculopathy, cervical region  Bilateral numbness and tingling of arms and legs  Muscle weakness (generalized)  Abnormal posture  Medical/Referring Diagnosis:    Neck pain  DDD (degenerative disc disease), cervical  Cervical radiculopathy  Bilateral numbness and tingling of arms and legs  Referring Physician:  Chantal Melo PA-C MD Orders:  PT Eval and Treat   Return MD Appt:  TBD by patient   Date of Onset:  Onset Date: 24 (Chronic neck pain with worsening symptoms over previous 1-2 months.)   Allergies:   Aspirin, Hydrocodone-acetaminophen, and Morphine  Restrictions/Precautions:   None      Interventions Planned (Treatment may consist of any combination of the following):     See Assessment Note    Subjective Comments:   Pt reports low back pain is still sore today after helping her family member pack and move boxes at home; per pt has been having increased BUE numbness since last night

## 2024-03-12 LAB
EST. AVERAGE GLUCOSE BLD GHB EST-MCNC: 146 MG/DL
HBA1C MFR BLD: 6.7 % (ref 4.8–5.6)

## 2024-03-13 NOTE — PROGRESS NOTES
Physical Therapy  [facility]  Date: 3/14/2024    Patient cancelled appointment for today due to conflict.      REBECA GonsalvesT

## 2024-03-14 ENCOUNTER — OFFICE VISIT (OUTPATIENT)
Dept: INTERNAL MEDICINE CLINIC | Facility: CLINIC | Age: 59
End: 2024-03-14
Payer: COMMERCIAL

## 2024-03-14 ENCOUNTER — HOSPITAL ENCOUNTER (OUTPATIENT)
Dept: PHYSICAL THERAPY | Age: 59
Setting detail: RECURRING SERIES
End: 2024-03-14
Payer: COMMERCIAL

## 2024-03-14 VITALS
HEART RATE: 86 BPM | OXYGEN SATURATION: 93 % | DIASTOLIC BLOOD PRESSURE: 104 MMHG | WEIGHT: 293 LBS | SYSTOLIC BLOOD PRESSURE: 186 MMHG | HEIGHT: 65 IN | BODY MASS INDEX: 48.82 KG/M2

## 2024-03-14 DIAGNOSIS — E78.2 MODERATE MIXED HYPERLIPIDEMIA NOT REQUIRING STATIN THERAPY: ICD-10-CM

## 2024-03-14 DIAGNOSIS — F33.0 DEPRESSION, MAJOR, RECURRENT, MILD (HCC): ICD-10-CM

## 2024-03-14 DIAGNOSIS — I10 WHITE COAT SYNDROME WITH HYPERTENSION: ICD-10-CM

## 2024-03-14 DIAGNOSIS — I10 ESSENTIAL HYPERTENSION, BENIGN: Primary | ICD-10-CM

## 2024-03-14 DIAGNOSIS — E11.40 TYPE 2 DIABETES MELLITUS WITH DIABETIC NEUROPATHY, WITHOUT LONG-TERM CURRENT USE OF INSULIN (HCC): ICD-10-CM

## 2024-03-14 PROCEDURE — G8417 CALC BMI ABV UP PARAM F/U: HCPCS | Performed by: NURSE PRACTITIONER

## 2024-03-14 PROCEDURE — 3077F SYST BP >= 140 MM HG: CPT | Performed by: NURSE PRACTITIONER

## 2024-03-14 PROCEDURE — 2022F DILAT RTA XM EVC RTNOPTHY: CPT | Performed by: NURSE PRACTITIONER

## 2024-03-14 PROCEDURE — 3017F COLORECTAL CA SCREEN DOC REV: CPT | Performed by: NURSE PRACTITIONER

## 2024-03-14 PROCEDURE — 3080F DIAST BP >= 90 MM HG: CPT | Performed by: NURSE PRACTITIONER

## 2024-03-14 PROCEDURE — 1036F TOBACCO NON-USER: CPT | Performed by: NURSE PRACTITIONER

## 2024-03-14 PROCEDURE — 99214 OFFICE O/P EST MOD 30 MIN: CPT | Performed by: NURSE PRACTITIONER

## 2024-03-14 PROCEDURE — 3044F HG A1C LEVEL LT 7.0%: CPT | Performed by: NURSE PRACTITIONER

## 2024-03-14 PROCEDURE — G8484 FLU IMMUNIZE NO ADMIN: HCPCS | Performed by: NURSE PRACTITIONER

## 2024-03-14 PROCEDURE — G8427 DOCREV CUR MEDS BY ELIG CLIN: HCPCS | Performed by: NURSE PRACTITIONER

## 2024-03-14 RX ORDER — CALCIUM CARBONATE/VITAMIN D3 500-10/5ML
1 LIQUID (ML) ORAL DAILY
COMMUNITY

## 2024-03-14 RX ORDER — METOPROLOL SUCCINATE 25 MG/1
12.5 TABLET, EXTENDED RELEASE ORAL DAILY
Qty: 45 TABLET | Refills: 1 | Status: SHIPPED | OUTPATIENT
Start: 2024-03-14

## 2024-03-14 RX ORDER — CHOLECALCIFEROL (VITAMIN D3) 125 MCG
500 CAPSULE ORAL DAILY
COMMUNITY

## 2024-03-14 RX ORDER — LISINOPRIL 30 MG/1
30 TABLET ORAL DAILY
Qty: 30 TABLET | Refills: 11 | Status: SHIPPED | OUTPATIENT
Start: 2024-03-14

## 2024-03-14 RX ORDER — PRAVASTATIN SODIUM 40 MG
40 TABLET ORAL DAILY
Qty: 90 TABLET | Refills: 1 | Status: SHIPPED | OUTPATIENT
Start: 2024-03-14

## 2024-03-14 RX ORDER — ESCITALOPRAM OXALATE 10 MG/1
10 TABLET ORAL DAILY
Qty: 30 TABLET | Refills: 5 | Status: SHIPPED | OUTPATIENT
Start: 2024-03-14

## 2024-03-14 RX ORDER — GABAPENTIN 300 MG/1
300 CAPSULE ORAL 3 TIMES DAILY
Qty: 90 CAPSULE | Refills: 11 | Status: SHIPPED | OUTPATIENT
Start: 2024-03-14 | End: 2025-03-09

## 2024-03-14 ASSESSMENT — PATIENT HEALTH QUESTIONNAIRE - PHQ9
9. THOUGHTS THAT YOU WOULD BE BETTER OFF DEAD, OR OF HURTING YOURSELF: 0
SUM OF ALL RESPONSES TO PHQ QUESTIONS 1-9: 12
5. POOR APPETITE OR OVEREATING: 3
6. FEELING BAD ABOUT YOURSELF - OR THAT YOU ARE A FAILURE OR HAVE LET YOURSELF OR YOUR FAMILY DOWN: 1
SUM OF ALL RESPONSES TO PHQ QUESTIONS 1-9: 12
2. FEELING DOWN, DEPRESSED OR HOPELESS: 1
SUM OF ALL RESPONSES TO PHQ QUESTIONS 1-9: 12
1. LITTLE INTEREST OR PLEASURE IN DOING THINGS: 1
10. IF YOU CHECKED OFF ANY PROBLEMS, HOW DIFFICULT HAVE THESE PROBLEMS MADE IT FOR YOU TO DO YOUR WORK, TAKE CARE OF THINGS AT HOME, OR GET ALONG WITH OTHER PEOPLE: 1
4. FEELING TIRED OR HAVING LITTLE ENERGY: 2
7. TROUBLE CONCENTRATING ON THINGS, SUCH AS READING THE NEWSPAPER OR WATCHING TELEVISION: 2
8. MOVING OR SPEAKING SO SLOWLY THAT OTHER PEOPLE COULD HAVE NOTICED. OR THE OPPOSITE, BEING SO FIGETY OR RESTLESS THAT YOU HAVE BEEN MOVING AROUND A LOT MORE THAN USUAL: 2
SUM OF ALL RESPONSES TO PHQ9 QUESTIONS 1 & 2: 2
SUM OF ALL RESPONSES TO PHQ QUESTIONS 1-9: 12

## 2024-03-14 ASSESSMENT — ENCOUNTER SYMPTOMS: SHORTNESS OF BREATH: 0

## 2024-03-14 NOTE — PROGRESS NOTES
the evening.       No current facility-administered medications for this visit.     Allergies   Allergen Reactions    Aspirin Other (See Comments)     Stomach upset    Hydrocodone-Acetaminophen Nausea And Vomiting     dizziness    Morphine Anxiety       ASSESSMENT and PLAN    Audrey was seen today for hypertension, diabetes and results.    Diagnoses and all orders for this visit:    Essential hypertension, benign  -     lisinopril (PRINIVIL;ZESTRIL) 30 MG tablet; Take 1 tablet by mouth daily  -     metoprolol succinate (TOPROL XL) 25 MG extended release tablet; Take 0.5 tablets by mouth daily    White coat syndrome with hypertension    Moderate mixed hyperlipidemia not requiring statin therapy  -     pravastatin (PRAVACHOL) 40 MG tablet; Take 1 tablet by mouth daily  -     Comprehensive Metabolic Panel; Future  -     Lipid Panel; Future    Type 2 diabetes mellitus with diabetic neuropathy, without long-term current use of insulin (HCC)  -     Hemoglobin A1C; Future    Depression, major, recurrent, mild (HCC)  -     escitalopram (LEXAPRO) 10 MG tablet; Take 1 tablet by mouth daily    Other orders  -     gabapentin (NEURONTIN) 300 MG capsule; Take 1 capsule by mouth 3 times daily for 360 days.    Blood pressure quite elevated in the office today.  Readings consistent with reading on home monitor.  Home blood pressure readings mostly in the normal range and it appears this is whitecoat hypertension.  Rx for metoprolol XL 25 mg to be taken one half daily if blood pressure over 160/100.  Will monitor blood pressure 3 days/week over the next couple of weeks and keep record of metoprolol intake if any.  Will send [employer] message with home readings in 2 weeks.    On the basis of positive PHQ-9 screening (PHQ-9 Total Score: 12), the following plan was implemented:  Will resume Lexapro 10 mg daily and agreeable to continuing this on a long-term basis. .  Patient will follow-up in 3 month(s) with PCP.    Intolerant of multiple

## 2024-03-19 ENCOUNTER — HOSPITAL ENCOUNTER (OUTPATIENT)
Dept: PHYSICAL THERAPY | Age: 59
Setting detail: RECURRING SERIES
End: 2024-03-19
Payer: COMMERCIAL

## 2024-03-19 NOTE — PROGRESS NOTES
Physical Therapy  [facility]  Date: 3/19/2024    Patient cancelled today due to illness.      REBECA GonsalvesT

## 2024-03-21 ENCOUNTER — HOSPITAL ENCOUNTER (OUTPATIENT)
Dept: PHYSICAL THERAPY | Age: 59
Setting detail: RECURRING SERIES
End: 2024-03-21
Payer: COMMERCIAL

## 2024-03-21 NOTE — PROGRESS NOTES
Physical Therapy  [facility]  Date: 3/21/2024    Patient cancelled today due to continued illness.      REBECA GonsalvesT

## 2024-03-26 ENCOUNTER — HOSPITAL ENCOUNTER (OUTPATIENT)
Dept: PHYSICAL THERAPY | Age: 59
Setting detail: RECURRING SERIES
End: 2024-03-26
Payer: COMMERCIAL

## 2024-03-27 NOTE — PROGRESS NOTES
Physical Therapy  [facility]  Date: 3/26/2024    Patient cancelled appointments all week due to family matter per pt statement.      REBECA GonsalvesT

## 2024-03-28 ENCOUNTER — APPOINTMENT (OUTPATIENT)
Dept: PHYSICAL THERAPY | Age: 59
End: 2024-03-28
Payer: COMMERCIAL

## 2024-05-21 ENCOUNTER — TELEPHONE (OUTPATIENT)
Dept: INTERNAL MEDICINE CLINIC | Facility: CLINIC | Age: 59
End: 2024-05-21

## 2024-05-21 NOTE — TELEPHONE ENCOUNTER
Pt called in stating her blood sugar was 254 and 276. Pt states that her normal blood sugar is 140s to 150s. Informed pt, per Socorro that blood sugar is a little high but not dangerously high. Socorro recommended that patient decrease sugars and carbs, and also go on some walks. Informed pt to call us back if no change in blood sugar. Pt verbalized understanding and agreed to call back if sugar doesn't go down or stays consistent.

## 2024-06-18 ENCOUNTER — OFFICE VISIT (OUTPATIENT)
Dept: INTERNAL MEDICINE CLINIC | Facility: CLINIC | Age: 59
End: 2024-06-18
Payer: COMMERCIAL

## 2024-06-18 VITALS
HEIGHT: 65 IN | DIASTOLIC BLOOD PRESSURE: 86 MMHG | BODY MASS INDEX: 48.82 KG/M2 | WEIGHT: 293 LBS | SYSTOLIC BLOOD PRESSURE: 132 MMHG

## 2024-06-18 DIAGNOSIS — E66.01 MORBID OBESITY WITH BMI OF 50.0-59.9, ADULT (HCC): ICD-10-CM

## 2024-06-18 DIAGNOSIS — F33.0 DEPRESSION, MAJOR, RECURRENT, MILD (HCC): ICD-10-CM

## 2024-06-18 DIAGNOSIS — E78.2 MODERATE MIXED HYPERLIPIDEMIA NOT REQUIRING STATIN THERAPY: ICD-10-CM

## 2024-06-18 DIAGNOSIS — I10 ESSENTIAL HYPERTENSION, BENIGN: ICD-10-CM

## 2024-06-18 DIAGNOSIS — E55.9 VITAMIN D DEFICIENCY: Primary | ICD-10-CM

## 2024-06-18 DIAGNOSIS — D22.9 MULTIPLE NEVI: ICD-10-CM

## 2024-06-18 DIAGNOSIS — E11.40 TYPE 2 DIABETES MELLITUS WITH DIABETIC NEUROPATHY, UNSPECIFIED WHETHER LONG TERM INSULIN USE (HCC): ICD-10-CM

## 2024-06-18 DIAGNOSIS — M15.9 OSTEOARTHRITIS OF MULTIPLE JOINTS, UNSPECIFIED OSTEOARTHRITIS TYPE: ICD-10-CM

## 2024-06-18 DIAGNOSIS — K21.9 GASTROESOPHAGEAL REFLUX DISEASE WITHOUT ESOPHAGITIS: ICD-10-CM

## 2024-06-18 PROCEDURE — 3017F COLORECTAL CA SCREEN DOC REV: CPT | Performed by: NURSE PRACTITIONER

## 2024-06-18 PROCEDURE — 3075F SYST BP GE 130 - 139MM HG: CPT | Performed by: NURSE PRACTITIONER

## 2024-06-18 PROCEDURE — 3079F DIAST BP 80-89 MM HG: CPT | Performed by: NURSE PRACTITIONER

## 2024-06-18 PROCEDURE — 3044F HG A1C LEVEL LT 7.0%: CPT | Performed by: NURSE PRACTITIONER

## 2024-06-18 PROCEDURE — 2022F DILAT RTA XM EVC RTNOPTHY: CPT | Performed by: NURSE PRACTITIONER

## 2024-06-18 PROCEDURE — 99214 OFFICE O/P EST MOD 30 MIN: CPT | Performed by: NURSE PRACTITIONER

## 2024-06-18 PROCEDURE — 1036F TOBACCO NON-USER: CPT | Performed by: NURSE PRACTITIONER

## 2024-06-18 PROCEDURE — G8427 DOCREV CUR MEDS BY ELIG CLIN: HCPCS | Performed by: NURSE PRACTITIONER

## 2024-06-18 PROCEDURE — G8417 CALC BMI ABV UP PARAM F/U: HCPCS | Performed by: NURSE PRACTITIONER

## 2024-06-18 RX ORDER — ESCITALOPRAM OXALATE 10 MG/1
10 TABLET ORAL DAILY
Qty: 30 TABLET | Refills: 5 | Status: SHIPPED | OUTPATIENT
Start: 2024-06-18

## 2024-06-18 RX ORDER — PRAVASTATIN SODIUM 40 MG
40 TABLET ORAL DAILY
Qty: 90 TABLET | Refills: 1 | Status: SHIPPED | OUTPATIENT
Start: 2024-06-18

## 2024-06-18 RX ORDER — OMEPRAZOLE 20 MG/1
20 CAPSULE, DELAYED RELEASE ORAL DAILY
Qty: 30 CAPSULE | Refills: 11 | Status: CANCELLED | OUTPATIENT
Start: 2024-06-18

## 2024-06-18 RX ORDER — CELECOXIB 100 MG/1
100 CAPSULE ORAL 2 TIMES DAILY
Qty: 60 CAPSULE | Refills: 11 | Status: SHIPPED | OUTPATIENT
Start: 2024-06-18

## 2024-06-18 RX ORDER — METOPROLOL SUCCINATE 25 MG/1
12.5 TABLET, EXTENDED RELEASE ORAL DAILY
Qty: 45 TABLET | Refills: 1 | Status: CANCELLED | OUTPATIENT
Start: 2024-06-18

## 2024-06-18 ASSESSMENT — ENCOUNTER SYMPTOMS: SHORTNESS OF BREATH: 0

## 2024-06-18 NOTE — PROGRESS NOTES
for this visit.     Allergies   Allergen Reactions    Aspirin Other (See Comments)     Stomach upset    Hydrocodone-Acetaminophen Nausea And Vomiting     dizziness    Morphine Anxiety       ASSESSMENT and PLAN    Audrey was seen today for hypertension.    Diagnoses and all orders for this visit:    Vitamin D deficiency  -     Vitamin D 25 Hydroxy; Future  -     Vitamin D 25 Hydroxy; Future    Osteoarthritis of multiple joints, unspecified osteoarthritis type  -     celecoxib (CELEBREX) 100 MG capsule; Take 1 capsule by mouth 2 times daily  -     CBC with Auto Differential; Future    Gastroesophageal reflux disease without esophagitis    Type 2 diabetes mellitus with diabetic neuropathy, unspecified whether long term insulin use (HCC)  -     metFORMIN (GLUCOPHAGE) 500 MG tablet; Take 1 tablet by mouth 2 times daily (with meals)  -     Hemoglobin A1C; Future  -     Comprehensive Metabolic Panel; Future  -     Hemoglobin A1C; Future    Depression, major, recurrent, mild (HCC)  -     escitalopram (LEXAPRO) 10 MG tablet; Take 1 tablet by mouth daily    Essential hypertension, benign  -     Urinalysis, Micro; Future    Moderate mixed hyperlipidemia not requiring statin therapy  -     pravastatin (PRAVACHOL) 40 MG tablet; Take 1 tablet by mouth daily  -     Comprehensive Metabolic Panel; Future  -     Lipid Panel; Future  -     Lipid Panel; Future  -     TSH; Future    Morbid obesity with BMI of 50.0-59.9, adult (HCC)    Multiple nevi      Return for fasting labs  On the basis of positive PHQ-9 screening ( ), the following plan was implemented: additional evaluation and assessment performed, follow-up plan includes but not limited to: Medication management and Referral to /Specialist for evaluation and management and Continue Lexapro .  Patient will follow-up in 4 month(s) with PCP.    Medical problems and test results were reviewed with the patient today.     FOLLOW UP    Return for CPE with fasting labs october or

## 2024-12-14 DIAGNOSIS — I10 ESSENTIAL HYPERTENSION, BENIGN: ICD-10-CM

## 2024-12-16 RX ORDER — HYDROCHLOROTHIAZIDE 25 MG/1
25 TABLET ORAL DAILY
Qty: 30 TABLET | Refills: 0 | OUTPATIENT
Start: 2024-12-16

## 2025-03-25 DIAGNOSIS — I10 ESSENTIAL HYPERTENSION, BENIGN: ICD-10-CM

## 2025-03-25 RX ORDER — GABAPENTIN 300 MG/1
CAPSULE ORAL
Qty: 90 CAPSULE | Refills: 0 | OUTPATIENT
Start: 2025-03-25

## 2025-03-25 RX ORDER — LISINOPRIL 30 MG/1
30 TABLET ORAL DAILY
Qty: 30 TABLET | Refills: 0 | OUTPATIENT
Start: 2025-03-25

## 2025-03-27 ENCOUNTER — OFFICE VISIT (OUTPATIENT)
Dept: INTERNAL MEDICINE CLINIC | Facility: CLINIC | Age: 60
End: 2025-03-27
Payer: COMMERCIAL

## 2025-03-27 VITALS
HEIGHT: 65 IN | DIASTOLIC BLOOD PRESSURE: 78 MMHG | SYSTOLIC BLOOD PRESSURE: 120 MMHG | BODY MASS INDEX: 48.82 KG/M2 | WEIGHT: 293 LBS

## 2025-03-27 DIAGNOSIS — J45.20 MILD INTERMITTENT ASTHMA WITHOUT COMPLICATION: Primary | ICD-10-CM

## 2025-03-27 DIAGNOSIS — E11.40 TYPE 2 DIABETES MELLITUS WITH DIABETIC NEUROPATHY, WITHOUT LONG-TERM CURRENT USE OF INSULIN (HCC): ICD-10-CM

## 2025-03-27 DIAGNOSIS — Z00.00 ROUTINE HEALTH MAINTENANCE: ICD-10-CM

## 2025-03-27 DIAGNOSIS — K21.9 GASTROESOPHAGEAL REFLUX DISEASE WITHOUT ESOPHAGITIS: ICD-10-CM

## 2025-03-27 DIAGNOSIS — R06.9 BREATHING PROBLEM: ICD-10-CM

## 2025-03-27 DIAGNOSIS — R13.12 OROPHARYNGEAL DYSPHAGIA: ICD-10-CM

## 2025-03-27 PROCEDURE — 3017F COLORECTAL CA SCREEN DOC REV: CPT | Performed by: NURSE PRACTITIONER

## 2025-03-27 PROCEDURE — 1036F TOBACCO NON-USER: CPT | Performed by: NURSE PRACTITIONER

## 2025-03-27 PROCEDURE — G8427 DOCREV CUR MEDS BY ELIG CLIN: HCPCS | Performed by: NURSE PRACTITIONER

## 2025-03-27 PROCEDURE — 3078F DIAST BP <80 MM HG: CPT | Performed by: NURSE PRACTITIONER

## 2025-03-27 PROCEDURE — 2022F DILAT RTA XM EVC RTNOPTHY: CPT | Performed by: NURSE PRACTITIONER

## 2025-03-27 PROCEDURE — 3046F HEMOGLOBIN A1C LEVEL >9.0%: CPT | Performed by: NURSE PRACTITIONER

## 2025-03-27 PROCEDURE — 3074F SYST BP LT 130 MM HG: CPT | Performed by: NURSE PRACTITIONER

## 2025-03-27 PROCEDURE — G8417 CALC BMI ABV UP PARAM F/U: HCPCS | Performed by: NURSE PRACTITIONER

## 2025-03-27 PROCEDURE — 99214 OFFICE O/P EST MOD 30 MIN: CPT | Performed by: NURSE PRACTITIONER

## 2025-03-27 RX ORDER — BUDESONIDE AND FORMOTEROL FUMARATE DIHYDRATE 160; 4.5 UG/1; UG/1
2 AEROSOL RESPIRATORY (INHALATION) 2 TIMES DAILY
Qty: 10.2 G | Refills: 3 | Status: SHIPPED | OUTPATIENT
Start: 2025-03-27

## 2025-03-27 RX ORDER — OMEPRAZOLE 20 MG/1
20 CAPSULE, DELAYED RELEASE ORAL DAILY
Qty: 30 CAPSULE | Refills: 11 | Status: SHIPPED | OUTPATIENT
Start: 2025-03-27

## 2025-03-27 SDOH — ECONOMIC STABILITY: FOOD INSECURITY: WITHIN THE PAST 12 MONTHS, THE FOOD YOU BOUGHT JUST DIDN'T LAST AND YOU DIDN'T HAVE MONEY TO GET MORE.: NEVER TRUE

## 2025-03-27 SDOH — ECONOMIC STABILITY: FOOD INSECURITY: WITHIN THE PAST 12 MONTHS, YOU WORRIED THAT YOUR FOOD WOULD RUN OUT BEFORE YOU GOT MONEY TO BUY MORE.: NEVER TRUE

## 2025-03-27 ASSESSMENT — PATIENT HEALTH QUESTIONNAIRE - PHQ9
SUM OF ALL RESPONSES TO PHQ QUESTIONS 1-9: 7
10. IF YOU CHECKED OFF ANY PROBLEMS, HOW DIFFICULT HAVE THESE PROBLEMS MADE IT FOR YOU TO DO YOUR WORK, TAKE CARE OF THINGS AT HOME, OR GET ALONG WITH OTHER PEOPLE: NOT DIFFICULT AT ALL
1. LITTLE INTEREST OR PLEASURE IN DOING THINGS: NOT AT ALL
SUM OF ALL RESPONSES TO PHQ QUESTIONS 1-9: 7
8. MOVING OR SPEAKING SO SLOWLY THAT OTHER PEOPLE COULD HAVE NOTICED. OR THE OPPOSITE, BEING SO FIGETY OR RESTLESS THAT YOU HAVE BEEN MOVING AROUND A LOT MORE THAN USUAL: NOT AT ALL
3. TROUBLE FALLING OR STAYING ASLEEP: NEARLY EVERY DAY
2. FEELING DOWN, DEPRESSED OR HOPELESS: NOT AT ALL
6. FEELING BAD ABOUT YOURSELF - OR THAT YOU ARE A FAILURE OR HAVE LET YOURSELF OR YOUR FAMILY DOWN: NOT AT ALL
SUM OF ALL RESPONSES TO PHQ QUESTIONS 1-9: 7
5. POOR APPETITE OR OVEREATING: NEARLY EVERY DAY
9. THOUGHTS THAT YOU WOULD BE BETTER OFF DEAD, OR OF HURTING YOURSELF: NOT AT ALL
SUM OF ALL RESPONSES TO PHQ QUESTIONS 1-9: 7
7. TROUBLE CONCENTRATING ON THINGS, SUCH AS READING THE NEWSPAPER OR WATCHING TELEVISION: NOT AT ALL
4. FEELING TIRED OR HAVING LITTLE ENERGY: SEVERAL DAYS

## 2025-03-27 ASSESSMENT — ENCOUNTER SYMPTOMS
SORE THROAT: 0
COUGH: 1
TROUBLE SWALLOWING: 1
WHEEZING: 1

## 2025-03-27 NOTE — PROGRESS NOTES
PROGRESS NOTE      Chief Complaint   Patient presents with    Shortness of Breath     Patient reports recent SOB with allergies and states it is worse a night when she lays down at night.    Dysphagia     Patient reports difficulty swallowing when she eats, drinks, and takes medications       Assessment & Plan  Dysphagia.  The patient reports difficulty swallowing, particularly with solids, and a sensation of food getting stuck in the throat. There is no associated heartburn or reflux symptoms. The patient will be referred to an ENT specialist for further evaluation of the dysphagia. Omeprazole 20 mg will be restarted to address potential silent reflux that may be contributing to the symptoms.    Asthma.  The patient has been experiencing increased coughing and difficulty breathing, especially at night. She has been using her albuterol inhaler more frequently. A prescription for Symbicort was provided as a maintenance inhaler. If Symbicort is not covered by insurance, alternatives like Advair, Wixela, or Trelegy will be considered. A plain film chest x-ray will be ordered to rule out any other causes of her symptoms. Prednisone was considered but not prescribed due to poorly controlled blood sugar levels.    Diabetes Mellitus.  The patient's blood sugar levels have been running between 150 and 200, with a reading of 200 this morning. She will continue her current medication regimen. Fasting labs will be scheduled in about a month to reassess her blood sugar levels.    Hypertension.  Her blood pressure is well controlled on her current regimen of lisinopril 30 mg and hydrochlorothiazide 25 mg. She will continue her current medications.    Follow-up  The patient will follow up in 1 month.    SUBJECTIVE    History of Present Illness  The patient presents for evaluation of gastrointestinal problem, difficulty swallowing, asthma, hypertension, and diabetes.    Gastrointestinal Problem  - She has been experiencing

## 2025-03-31 ENCOUNTER — OFFICE VISIT (OUTPATIENT)
Dept: ENT CLINIC | Age: 60
End: 2025-03-31
Payer: COMMERCIAL

## 2025-03-31 VITALS
SYSTOLIC BLOOD PRESSURE: 120 MMHG | BODY MASS INDEX: 48.82 KG/M2 | DIASTOLIC BLOOD PRESSURE: 78 MMHG | WEIGHT: 293 LBS | HEIGHT: 65 IN

## 2025-03-31 DIAGNOSIS — R13.10 DYSPHAGIA, UNSPECIFIED TYPE: Primary | ICD-10-CM

## 2025-03-31 PROCEDURE — 31575 DIAGNOSTIC LARYNGOSCOPY: CPT | Performed by: OTOLARYNGOLOGY

## 2025-03-31 PROCEDURE — 3078F DIAST BP <80 MM HG: CPT | Performed by: OTOLARYNGOLOGY

## 2025-03-31 PROCEDURE — 99204 OFFICE O/P NEW MOD 45 MIN: CPT | Performed by: OTOLARYNGOLOGY

## 2025-03-31 PROCEDURE — 3017F COLORECTAL CA SCREEN DOC REV: CPT | Performed by: OTOLARYNGOLOGY

## 2025-03-31 PROCEDURE — G8427 DOCREV CUR MEDS BY ELIG CLIN: HCPCS | Performed by: OTOLARYNGOLOGY

## 2025-03-31 PROCEDURE — 1036F TOBACCO NON-USER: CPT | Performed by: OTOLARYNGOLOGY

## 2025-03-31 PROCEDURE — G8417 CALC BMI ABV UP PARAM F/U: HCPCS | Performed by: OTOLARYNGOLOGY

## 2025-03-31 PROCEDURE — 3074F SYST BP LT 130 MM HG: CPT | Performed by: OTOLARYNGOLOGY

## 2025-03-31 ASSESSMENT — ENCOUNTER SYMPTOMS
TROUBLE SWALLOWING: 1
ALLERGIC/IMMUNOLOGIC NEGATIVE: 1
EYES NEGATIVE: 1
RESPIRATORY NEGATIVE: 1
GASTROINTESTINAL NEGATIVE: 1

## 2025-03-31 NOTE — PROGRESS NOTES
Chief Complaint   Patient presents with    New Patient     Dysphagia   Getting chocked feels like food gets stuck.       HPI:  Audrey Gage is a 59 y.o. female seen today in initial consultation for her throat.  She has always had trouble swallowing, especially pills for most of her life.  She feels like she has to almost manually force down pills when swallowing.  Over the past week or so, she has noted tightness in her throat, almost like she is being strangled or suffocated.  The symptoms occur throughout the day, but are worse at nighttime.  She has chronic back pain and typically has to sleep with her head of bed elevated which does help somewhat with her symptoms.  She feels like she has even had to use her inhaler more than usual.  She does snore at nighttime but is never had a sleep study.  She denies any chronic sore throat or pain with swallowing.  She has not had any recent fevers or chills.  She reports no previous swallowing pathology and has never seen GI or been worked up with a barium swallow study.    Past Medical History, Past Surgical History, Family history, Social History, and Medications were all reviewed with the patient today and updated as necessary.     Allergies   Allergen Reactions    Aspirin Other (See Comments)     Stomach upset    Hydrocodone-Acetaminophen Nausea And Vomiting     dizziness    Morphine Anxiety     Patient Active Problem List   Diagnosis    Chronic fatigue    Migraine without aura and without status migrainosus, not intractable    Atherosclerosis of coronary artery    Tension type headache    Type 2 diabetes mellitus with diabetic neuropathy (HCC)    Multiple nevi    S/P appendectomy    Depression, major, recurrent, mild    Mild intermittent asthma without complication    Elevated TSH    Vitamin D deficiency    GERD (gastroesophageal reflux disease)    Carpal tunnel syndrome on both sides    Morbid obesity with BMI of 50.0-59.9, adult    Hyperlipidemia    Elevated

## 2025-04-28 DIAGNOSIS — I10 ESSENTIAL HYPERTENSION, BENIGN: ICD-10-CM

## 2025-04-28 RX ORDER — HYDROCHLOROTHIAZIDE 25 MG/1
25 TABLET ORAL DAILY
Qty: 30 TABLET | Refills: 1 | Status: SHIPPED | OUTPATIENT
Start: 2025-04-28

## 2025-04-28 RX ORDER — LISINOPRIL 30 MG/1
30 TABLET ORAL DAILY
Qty: 30 TABLET | Refills: 1 | Status: SHIPPED | OUTPATIENT
Start: 2025-04-28

## 2025-04-28 NOTE — TELEPHONE ENCOUNTER
Please advise. Patient last seen on 6/18/24, follow-up scheduled for 6/18/25. Rxs last wrote on 3/14/24 #30 with 11 refills. Rx pended.

## 2025-08-07 DIAGNOSIS — I10 ESSENTIAL HYPERTENSION, BENIGN: ICD-10-CM

## 2025-08-07 DIAGNOSIS — E11.40 TYPE 2 DIABETES MELLITUS WITH DIABETIC NEUROPATHY, UNSPECIFIED WHETHER LONG TERM INSULIN USE (HCC): ICD-10-CM

## 2025-08-07 RX ORDER — HYDROCHLOROTHIAZIDE 25 MG/1
25 TABLET ORAL DAILY
Qty: 30 TABLET | Refills: 1 | Status: SHIPPED | OUTPATIENT
Start: 2025-08-07

## 2025-08-07 RX ORDER — LISINOPRIL 30 MG/1
30 TABLET ORAL DAILY
Qty: 30 TABLET | Refills: 1 | Status: SHIPPED | OUTPATIENT
Start: 2025-08-07

## (undated) DEVICE — BLADELESS OPTICAL TROCAR WITH FIXATION CANNULA: Brand: VERSAPORT

## (undated) DEVICE — INTENDED FOR TISSUE SEPARATION, AND OTHER PROCEDURES THAT REQUIRE A SHARP SURGICAL BLADE TO PUNCTURE OR CUT.: Brand: BARD-PARKER SAFETY BLADES SIZE 11, STERILE

## (undated) DEVICE — TRAY CATH 16F DRN BG LTX -- CONVERT TO ITEM 363158

## (undated) DEVICE — BLUNT TROCAR WITH THREADED ANCHOR: Brand: VERSAONE

## (undated) DEVICE — 2, DISPOSABLE SUCTION/IRRIGATOR WITHOUT DISPOSABLE TIP: Brand: STRYKEFLOW

## (undated) DEVICE — 2000CC GUARDIAN II: Brand: GUARDIAN

## (undated) DEVICE — SOLUTION IV 1000ML 0.9% SOD CHL

## (undated) DEVICE — BAG SPEC RETRV 275ML 10ML DISPOSABLE RELIACATCH

## (undated) DEVICE — SHEARS ENDOSCP L36CM DIA5MM ULTRASONIC CRV TIP W/ ADV

## (undated) DEVICE — (D)STRIP SKN CLSR 0.5X4IN WHT --

## (undated) DEVICE — (D)PREP SKN CHLRAPRP APPL 26ML -- CONVERT TO ITEM 371833

## (undated) DEVICE — SUTURE VCRL SZ 3-0 L27IN ABSRB UD L26MM SH 1/2 CIR J416H

## (undated) DEVICE — CONTAINER SPEC FRMLN 120ML --

## (undated) DEVICE — RELOAD STPL SZ 0 L45MM DIA3.5MM 0DEG STD REG TISS BLU TI

## (undated) DEVICE — [HIGH FLOW INSUFFLATOR,  DO NOT USE IF PACKAGE IS DAMAGED,  KEEP DRY,  KEEP AWAY FROM SUNLIGHT,  PROTECT FROM HEAT AND RADIOACTIVE SOURCES.]: Brand: PNEUMOSURE

## (undated) DEVICE — CUTTER ENDOSCP L340MM LIN ARTC SGL STROKE FIRING ENDOPATH

## (undated) DEVICE — MASTISOL ADHESIVE LIQ 2/3ML

## (undated) DEVICE — REM POLYHESIVE ADULT PATIENT RETURN ELECTRODE: Brand: VALLEYLAB

## (undated) DEVICE — VISUALIZATION SYSTEM: Brand: CLEARIFY

## (undated) DEVICE — GOWN,REINFORCED,POLY,AURORA,XXLARGE,STR: Brand: MEDLINE

## (undated) DEVICE — GARMENT,MEDLINE,DVT,INT,CALF,MED, GEN2: Brand: MEDLINE

## (undated) DEVICE — LAP CHOLE: Brand: MEDLINE INDUSTRIES, INC.

## (undated) DEVICE — STANDARD HYPODERMIC NEEDLE,POLYPROPYLENE HUB: Brand: MONOJECT

## (undated) DEVICE — UNIVERSAL FIXATION CANNULA: Brand: VERSAONE